# Patient Record
Sex: FEMALE | Race: WHITE | NOT HISPANIC OR LATINO | Employment: OTHER | URBAN - METROPOLITAN AREA
[De-identification: names, ages, dates, MRNs, and addresses within clinical notes are randomized per-mention and may not be internally consistent; named-entity substitution may affect disease eponyms.]

---

## 2017-04-20 ENCOUNTER — APPOINTMENT (OUTPATIENT)
Dept: WOMENS IMAGING | Facility: HOSPITAL | Age: 67
End: 2017-04-20

## 2017-04-20 PROCEDURE — G0202 SCR MAMMO BI INCL CAD: HCPCS | Performed by: RADIOLOGY

## 2017-04-20 PROCEDURE — 77063 BREAST TOMOSYNTHESIS BI: CPT | Performed by: RADIOLOGY

## 2017-05-31 ENCOUNTER — OFFICE VISIT (OUTPATIENT)
Dept: NEUROSURGERY | Facility: CLINIC | Age: 67
End: 2017-05-31

## 2017-05-31 VITALS
DIASTOLIC BLOOD PRESSURE: 68 MMHG | HEART RATE: 68 BPM | SYSTOLIC BLOOD PRESSURE: 115 MMHG | BODY MASS INDEX: 21.19 KG/M2 | HEIGHT: 67 IN | WEIGHT: 135 LBS

## 2017-05-31 DIAGNOSIS — N39.3 STRESS INCONTINENCE IN FEMALE: Primary | ICD-10-CM

## 2017-05-31 DIAGNOSIS — M51.36 DEGENERATION OF LUMBAR INTERVERTEBRAL DISC: ICD-10-CM

## 2017-05-31 PROCEDURE — 99213 OFFICE O/P EST LOW 20 MIN: CPT | Performed by: NURSE PRACTITIONER

## 2017-05-31 RX ORDER — METHYLPREDNISOLONE 4 MG/1
TABLET ORAL
Qty: 1 TABLET | Refills: 0 | Status: SHIPPED | OUTPATIENT
Start: 2017-05-31 | End: 2018-06-11 | Stop reason: HOSPADM

## 2017-06-15 ENCOUNTER — TELEPHONE (OUTPATIENT)
Dept: NEUROSURGERY | Facility: CLINIC | Age: 67
End: 2017-06-15

## 2017-06-15 NOTE — TELEPHONE ENCOUNTER
Patient states that at her last visit you discussed seeing a urologist and possibly scheduling an MRI. She states that she spoke with Liyah Mejia and she thinks that she needs to go ahead with the MRI. Do you want to order an MRI? I didn't see anything in your note regarding ordering an MRI.

## 2017-07-05 ENCOUNTER — APPOINTMENT (OUTPATIENT)
Dept: WOMENS IMAGING | Facility: HOSPITAL | Age: 67
End: 2017-07-05

## 2017-07-05 PROCEDURE — 77080 DXA BONE DENSITY AXIAL: CPT | Performed by: RADIOLOGY

## 2017-08-30 ENCOUNTER — TELEPHONE (OUTPATIENT)
Dept: NEUROSURGERY | Facility: CLINIC | Age: 67
End: 2017-08-30

## 2017-09-01 NOTE — TELEPHONE ENCOUNTER
The message you routed me on this patient was completely blank.    Please discuss with Prabha if you need anything for her.

## 2018-04-23 ENCOUNTER — APPOINTMENT (OUTPATIENT)
Dept: WOMENS IMAGING | Facility: HOSPITAL | Age: 68
End: 2018-04-23

## 2018-04-23 PROCEDURE — 77063 BREAST TOMOSYNTHESIS BI: CPT | Performed by: RADIOLOGY

## 2018-04-23 PROCEDURE — 77067 SCR MAMMO BI INCL CAD: CPT | Performed by: RADIOLOGY

## 2018-06-06 NOTE — PROGRESS NOTES
Subjective   Patient ID: Chely Marie is a 67 y.o. female is here today for 1 year follow-up on neck and low back pain. Patient states that she is doing better. Patient currently taking Norco 5-325 mg, Flexeril 10 mg PRN for pain    Ms. Marie returns to the office for re-evaluation of chronic neck and low back pain. She has been doing physical therapy and she has been seeing a  as well. Right now her symptoms are well controlled.       Back Pain   This is a chronic problem. The current episode started more than 1 year ago. The problem occurs rarely. The problem has been gradually improving since onset. The pain is present in the lumbar spine. The pain does not radiate. The pain is at a severity of 0/10. The symptoms are aggravated by sitting. Associated symptoms include numbness (bilateral toes). Pertinent negatives include no bladder incontinence, bowel incontinence, chest pain, headaches, leg pain or tingling. She has tried home exercises and muscle relaxant (pain medication) for the symptoms.   Neck Pain    This is a chronic problem. The current episode started more than 1 year ago. The problem occurs rarely. The problem has been gradually improving. The pain is at a severity of 0/10. Associated symptoms include numbness (bilateral toes). Pertinent negatives include no chest pain, headaches, leg pain, pain with swallowing, tingling, trouble swallowing or visual change. She has tried home exercises and muscle relaxants (pain medication) for the symptoms.       The following portions of the patient's history were reviewed and updated as appropriate: allergies, current medications, past family history, past medical history, past social history, past surgical history and problem list.    Review of Systems   HENT: Negative for trouble swallowing.    Respiratory: Negative for chest tightness and shortness of breath.    Cardiovascular: Negative for chest pain.   Gastrointestinal: Negative for  bowel incontinence.   Genitourinary: Negative for bladder incontinence.   Musculoskeletal: Positive for back pain and neck pain.   Neurological: Positive for numbness (bilateral toes). Negative for tingling and headaches.   All other systems reviewed and are negative.      Objective   Physical Exam   Constitutional: She is oriented to person, place, and time. She appears well-developed and well-nourished. She is cooperative. No distress.   HENT:   Head: Normocephalic and atraumatic.   Eyes: Conjunctivae are normal.   Neck: Normal range of motion. Neck supple.   Cardiovascular: Normal rate.    Pulmonary/Chest: Effort normal. No respiratory distress.   Abdominal: Soft. She exhibits no distension. There is no tenderness.   Musculoskeletal: Normal range of motion. She exhibits no edema.   Neurological: She is alert and oriented to person, place, and time. She has normal strength. She displays normal reflexes. No sensory deficit. She exhibits normal muscle tone. Coordination normal. GCS eye subscore is 4. GCS verbal subscore is 5. GCS motor subscore is 6.   No motor or sensory deficits in upper or lower extremities. DTR's normal throughout. Negative Parra's; negative clonus. SLR negative bilaterally.   Skin: Skin is warm and dry. No rash noted. She is not diaphoretic.   Psychiatric: She has a normal mood and affect. Thought content normal.   Vitals reviewed.      Assessment/Plan     Medical Decision Making:      Ms. Marie is doing well. Her back pain and neck pain is well controlled with muscle relaxers. She will continue with PT exercises and call us if she notices worsening symptoms. I will see her again in one year.     Chely was seen today for follow-up, back pain and neck pain.    Diagnoses and all orders for this visit:    Degeneration of lumbar intervertebral disc    Degeneration of cervical intervertebral disc    Other orders  -     cyclobenzaprine (FLEXERIL) 10 MG tablet; Take 1 tablet by mouth 3  (Three) Times a Day As Needed for Muscle Spasms.      Return in about 1 year (around 6/11/2019).

## 2018-06-11 ENCOUNTER — OFFICE VISIT (OUTPATIENT)
Dept: NEUROSURGERY | Facility: CLINIC | Age: 68
End: 2018-06-11

## 2018-06-11 VITALS — DIASTOLIC BLOOD PRESSURE: 71 MMHG | SYSTOLIC BLOOD PRESSURE: 124 MMHG | HEIGHT: 67 IN | HEART RATE: 67 BPM

## 2018-06-11 DIAGNOSIS — M50.30 DEGENERATION OF CERVICAL INTERVERTEBRAL DISC: ICD-10-CM

## 2018-06-11 DIAGNOSIS — M51.36 DEGENERATION OF LUMBAR INTERVERTEBRAL DISC: Primary | ICD-10-CM

## 2018-06-11 PROCEDURE — 99213 OFFICE O/P EST LOW 20 MIN: CPT | Performed by: NURSE PRACTITIONER

## 2018-06-11 RX ORDER — CYCLOBENZAPRINE HCL 10 MG
10 TABLET ORAL 3 TIMES DAILY PRN
Qty: 40 TABLET | Refills: 2 | Status: SHIPPED | OUTPATIENT
Start: 2018-06-11 | End: 2019-06-12 | Stop reason: SDUPTHER

## 2018-10-24 ENCOUNTER — APPOINTMENT (OUTPATIENT)
Dept: GENERAL RADIOLOGY | Facility: HOSPITAL | Age: 68
End: 2018-10-24

## 2018-10-24 PROCEDURE — 73630 X-RAY EXAM OF FOOT: CPT | Performed by: FAMILY MEDICINE

## 2018-10-24 PROCEDURE — 73610 X-RAY EXAM OF ANKLE: CPT | Performed by: FAMILY MEDICINE

## 2018-10-26 RX ORDER — FEXOFENADINE HCL 180 MG/1
180 TABLET ORAL DAILY
COMMUNITY
End: 2020-11-12

## 2018-10-26 RX ORDER — CETIRIZINE HYDROCHLORIDE 10 MG/1
10 TABLET ORAL DAILY
COMMUNITY

## 2018-10-29 ENCOUNTER — HOSPITAL ENCOUNTER (OUTPATIENT)
Facility: HOSPITAL | Age: 68
Setting detail: HOSPITAL OUTPATIENT SURGERY
Discharge: HOME | End: 2018-10-29
Attending: ORTHOPAEDIC SURGERY | Admitting: ORTHOPAEDIC SURGERY

## 2018-10-29 ENCOUNTER — ANESTHESIA (OUTPATIENT)
Dept: PERIOP | Facility: HOSPITAL | Age: 68
End: 2018-10-29

## 2018-10-29 ENCOUNTER — APPOINTMENT (OUTPATIENT)
Dept: GENERAL RADIOLOGY | Facility: HOSPITAL | Age: 68
End: 2018-10-29

## 2018-10-29 ENCOUNTER — ANESTHESIA EVENT (OUTPATIENT)
Dept: PERIOP | Facility: HOSPITAL | Age: 68
End: 2018-10-29

## 2018-10-29 VITALS
TEMPERATURE: 97.7 F | RESPIRATION RATE: 18 BRPM | OXYGEN SATURATION: 100 % | DIASTOLIC BLOOD PRESSURE: 79 MMHG | SYSTOLIC BLOOD PRESSURE: 142 MMHG | HEART RATE: 66 BPM

## 2018-10-29 PROCEDURE — 25010000002 FENTANYL CITRATE (PF) 100 MCG/2ML SOLUTION: Performed by: ANESTHESIOLOGY

## 2018-10-29 PROCEDURE — C1713 ANCHOR/SCREW BN/BN,TIS/BN: HCPCS | Performed by: ORTHOPAEDIC SURGERY

## 2018-10-29 PROCEDURE — 25010000002 MIDAZOLAM PER 1 MG: Performed by: ANESTHESIOLOGY

## 2018-10-29 PROCEDURE — 76000 FLUOROSCOPY <1 HR PHYS/QHP: CPT

## 2018-10-29 PROCEDURE — 73610 X-RAY EXAM OF ANKLE: CPT

## 2018-10-29 PROCEDURE — 25010000002 DEXAMETHASONE PER 1 MG: Performed by: ANESTHESIOLOGY

## 2018-10-29 PROCEDURE — 25010000002 PROPOFOL 10 MG/ML EMULSION: Performed by: ANESTHESIOLOGY

## 2018-10-29 PROCEDURE — 25010000002 ONDANSETRON PER 1 MG: Performed by: ANESTHESIOLOGY

## 2018-10-29 PROCEDURE — 25010000003 CEFAZOLIN IN DEXTROSE 2-4 GM/100ML-% SOLUTION: Performed by: ORTHOPAEDIC SURGERY

## 2018-10-29 DEVICE — IMPLANTABLE DEVICE: Type: IMPLANTABLE DEVICE | Site: ANKLE | Status: FUNCTIONAL

## 2018-10-29 DEVICE — SCRW MAXLOCK EXTRM NL 4X16MM: Type: IMPLANTABLE DEVICE | Site: ANKLE | Status: FUNCTIONAL

## 2018-10-29 DEVICE — SCRW MAXLOCK EXTRM NL 4X14MM: Type: IMPLANTABLE DEVICE | Site: ANKLE | Status: FUNCTIONAL

## 2018-10-29 RX ORDER — CEFAZOLIN SODIUM 2 G/100ML
2 INJECTION, SOLUTION INTRAVENOUS ONCE
Status: COMPLETED | OUTPATIENT
Start: 2018-10-29 | End: 2018-10-29

## 2018-10-29 RX ORDER — EPHEDRINE SULFATE 50 MG/ML
5 INJECTION, SOLUTION INTRAVENOUS ONCE AS NEEDED
Status: DISCONTINUED | OUTPATIENT
Start: 2018-10-29 | End: 2018-10-29 | Stop reason: HOSPADM

## 2018-10-29 RX ORDER — OXYCODONE AND ACETAMINOPHEN 7.5; 325 MG/1; MG/1
1 TABLET ORAL ONCE AS NEEDED
Status: DISCONTINUED | OUTPATIENT
Start: 2018-10-29 | End: 2018-10-29 | Stop reason: HOSPADM

## 2018-10-29 RX ORDER — PROMETHAZINE HYDROCHLORIDE 25 MG/ML
12.5 INJECTION, SOLUTION INTRAMUSCULAR; INTRAVENOUS ONCE AS NEEDED
Status: DISCONTINUED | OUTPATIENT
Start: 2018-10-29 | End: 2018-10-29 | Stop reason: HOSPADM

## 2018-10-29 RX ORDER — BUPIVACAINE HYDROCHLORIDE 2.5 MG/ML
INJECTION, SOLUTION EPIDURAL; INFILTRATION; INTRACAUDAL AS NEEDED
Status: DISCONTINUED | OUTPATIENT
Start: 2018-10-29 | End: 2018-10-29 | Stop reason: SURG

## 2018-10-29 RX ORDER — PROMETHAZINE HYDROCHLORIDE 25 MG/1
12.5 TABLET ORAL ONCE AS NEEDED
Status: DISCONTINUED | OUTPATIENT
Start: 2018-10-29 | End: 2018-10-29 | Stop reason: HOSPADM

## 2018-10-29 RX ORDER — FAMOTIDINE 10 MG/ML
20 INJECTION, SOLUTION INTRAVENOUS ONCE
Status: COMPLETED | OUTPATIENT
Start: 2018-10-29 | End: 2018-10-29

## 2018-10-29 RX ORDER — SODIUM CHLORIDE 0.9 % (FLUSH) 0.9 %
1-10 SYRINGE (ML) INJECTION AS NEEDED
Status: DISCONTINUED | OUTPATIENT
Start: 2018-10-29 | End: 2018-10-29 | Stop reason: HOSPADM

## 2018-10-29 RX ORDER — LIDOCAINE HYDROCHLORIDE 20 MG/ML
INJECTION, SOLUTION INFILTRATION; PERINEURAL AS NEEDED
Status: DISCONTINUED | OUTPATIENT
Start: 2018-10-29 | End: 2018-10-29 | Stop reason: SURG

## 2018-10-29 RX ORDER — HYDROMORPHONE HYDROCHLORIDE 1 MG/ML
0.5 INJECTION, SOLUTION INTRAMUSCULAR; INTRAVENOUS; SUBCUTANEOUS
Status: DISCONTINUED | OUTPATIENT
Start: 2018-10-29 | End: 2018-10-29 | Stop reason: HOSPADM

## 2018-10-29 RX ORDER — PROMETHAZINE HYDROCHLORIDE 25 MG/1
25 TABLET ORAL ONCE AS NEEDED
Status: DISCONTINUED | OUTPATIENT
Start: 2018-10-29 | End: 2018-10-29 | Stop reason: HOSPADM

## 2018-10-29 RX ORDER — MIDAZOLAM HYDROCHLORIDE 1 MG/ML
2 INJECTION INTRAMUSCULAR; INTRAVENOUS
Status: DISCONTINUED | OUTPATIENT
Start: 2018-10-29 | End: 2018-10-29 | Stop reason: HOSPADM

## 2018-10-29 RX ORDER — HYDROCODONE BITARTRATE AND ACETAMINOPHEN 7.5; 325 MG/1; MG/1
1 TABLET ORAL ONCE AS NEEDED
Status: DISCONTINUED | OUTPATIENT
Start: 2018-10-29 | End: 2018-10-29 | Stop reason: HOSPADM

## 2018-10-29 RX ORDER — ONDANSETRON 2 MG/ML
4 INJECTION INTRAMUSCULAR; INTRAVENOUS ONCE AS NEEDED
Status: DISCONTINUED | OUTPATIENT
Start: 2018-10-29 | End: 2018-10-29 | Stop reason: HOSPADM

## 2018-10-29 RX ORDER — LIDOCAINE HYDROCHLORIDE 10 MG/ML
0.5 INJECTION, SOLUTION EPIDURAL; INFILTRATION; INTRACAUDAL; PERINEURAL ONCE AS NEEDED
Status: DISCONTINUED | OUTPATIENT
Start: 2018-10-29 | End: 2018-10-29 | Stop reason: HOSPADM

## 2018-10-29 RX ORDER — DEXAMETHASONE SODIUM PHOSPHATE 10 MG/ML
INJECTION INTRAMUSCULAR; INTRAVENOUS AS NEEDED
Status: DISCONTINUED | OUTPATIENT
Start: 2018-10-29 | End: 2018-10-29 | Stop reason: SURG

## 2018-10-29 RX ORDER — SODIUM CHLORIDE, SODIUM LACTATE, POTASSIUM CHLORIDE, CALCIUM CHLORIDE 600; 310; 30; 20 MG/100ML; MG/100ML; MG/100ML; MG/100ML
9 INJECTION, SOLUTION INTRAVENOUS CONTINUOUS
Status: DISCONTINUED | OUTPATIENT
Start: 2018-10-29 | End: 2018-10-29 | Stop reason: HOSPADM

## 2018-10-29 RX ORDER — NALOXONE HCL 0.4 MG/ML
0.2 VIAL (ML) INJECTION AS NEEDED
Status: DISCONTINUED | OUTPATIENT
Start: 2018-10-29 | End: 2018-10-29 | Stop reason: HOSPADM

## 2018-10-29 RX ORDER — ONDANSETRON 2 MG/ML
INJECTION INTRAMUSCULAR; INTRAVENOUS AS NEEDED
Status: DISCONTINUED | OUTPATIENT
Start: 2018-10-29 | End: 2018-10-29 | Stop reason: SURG

## 2018-10-29 RX ORDER — FENTANYL CITRATE 50 UG/ML
50 INJECTION, SOLUTION INTRAMUSCULAR; INTRAVENOUS
Status: DISCONTINUED | OUTPATIENT
Start: 2018-10-29 | End: 2018-10-29 | Stop reason: HOSPADM

## 2018-10-29 RX ORDER — SCOLOPAMINE TRANSDERMAL SYSTEM 1 MG/1
1 PATCH, EXTENDED RELEASE TRANSDERMAL ONCE
Status: DISCONTINUED | OUTPATIENT
Start: 2018-10-29 | End: 2018-10-29 | Stop reason: HOSPADM

## 2018-10-29 RX ORDER — PROMETHAZINE HYDROCHLORIDE 25 MG/1
25 SUPPOSITORY RECTAL ONCE AS NEEDED
Status: DISCONTINUED | OUTPATIENT
Start: 2018-10-29 | End: 2018-10-29 | Stop reason: HOSPADM

## 2018-10-29 RX ORDER — FLUMAZENIL 0.1 MG/ML
0.2 INJECTION INTRAVENOUS AS NEEDED
Status: DISCONTINUED | OUTPATIENT
Start: 2018-10-29 | End: 2018-10-29 | Stop reason: HOSPADM

## 2018-10-29 RX ORDER — PROPOFOL 10 MG/ML
VIAL (ML) INTRAVENOUS AS NEEDED
Status: DISCONTINUED | OUTPATIENT
Start: 2018-10-29 | End: 2018-10-29 | Stop reason: SURG

## 2018-10-29 RX ORDER — MIDAZOLAM HYDROCHLORIDE 1 MG/ML
1 INJECTION INTRAMUSCULAR; INTRAVENOUS
Status: DISCONTINUED | OUTPATIENT
Start: 2018-10-29 | End: 2018-10-29 | Stop reason: HOSPADM

## 2018-10-29 RX ORDER — LABETALOL HYDROCHLORIDE 5 MG/ML
5 INJECTION, SOLUTION INTRAVENOUS
Status: DISCONTINUED | OUTPATIENT
Start: 2018-10-29 | End: 2018-10-29 | Stop reason: HOSPADM

## 2018-10-29 RX ORDER — DIPHENHYDRAMINE HCL 25 MG
25 CAPSULE ORAL EVERY 6 HOURS PRN
Status: DISCONTINUED | OUTPATIENT
Start: 2018-10-29 | End: 2018-10-29 | Stop reason: HOSPADM

## 2018-10-29 RX ADMIN — DEXAMETHASONE SODIUM PHOSPHATE 8 MG: 10 INJECTION INTRAMUSCULAR; INTRAVENOUS at 16:36

## 2018-10-29 RX ADMIN — CEFAZOLIN SODIUM 2 G: 2 INJECTION, SOLUTION INTRAVENOUS at 16:18

## 2018-10-29 RX ADMIN — SODIUM CHLORIDE, POTASSIUM CHLORIDE, SODIUM LACTATE AND CALCIUM CHLORIDE: 600; 310; 30; 20 INJECTION, SOLUTION INTRAVENOUS at 16:15

## 2018-10-29 RX ADMIN — ONDANSETRON 4 MG: 2 INJECTION INTRAMUSCULAR; INTRAVENOUS at 16:36

## 2018-10-29 RX ADMIN — FENTANYL CITRATE 50 MCG: 50 INJECTION, SOLUTION INTRAMUSCULAR; INTRAVENOUS at 14:26

## 2018-10-29 RX ADMIN — MIDAZOLAM 2 MG: 1 INJECTION INTRAMUSCULAR; INTRAVENOUS at 14:26

## 2018-10-29 RX ADMIN — BUPIVACAINE HYDROCHLORIDE 10 ML: 2.5 INJECTION, SOLUTION EPIDURAL; INFILTRATION; INTRACAUDAL; PERINEURAL at 14:37

## 2018-10-29 RX ADMIN — LIDOCAINE HYDROCHLORIDE 80 MG: 20 INJECTION, SOLUTION INFILTRATION; PERINEURAL at 16:17

## 2018-10-29 RX ADMIN — BUPIVACAINE HYDROCHLORIDE 10 ML: 2.5 INJECTION, SOLUTION EPIDURAL; INFILTRATION; INTRACAUDAL; PERINEURAL at 14:33

## 2018-10-29 RX ADMIN — SCOPOLAMINE 1 PATCH: 1 PATCH, EXTENDED RELEASE TRANSDERMAL at 14:25

## 2018-10-29 RX ADMIN — BUPIVACAINE 10 ML: 13.3 INJECTION, SUSPENSION, LIPOSOMAL INFILTRATION at 14:37

## 2018-10-29 RX ADMIN — FAMOTIDINE 20 MG: 10 INJECTION INTRAVENOUS at 14:25

## 2018-10-29 RX ADMIN — BUPIVACAINE 10 ML: 13.3 INJECTION, SUSPENSION, LIPOSOMAL INFILTRATION at 14:33

## 2018-10-29 RX ADMIN — PROPOFOL 125 MG: 10 INJECTION, EMULSION INTRAVENOUS at 16:17

## 2018-10-29 NOTE — ANESTHESIA PROCEDURE NOTES
Airway  Urgency: elective    Airway not difficult    General Information and Staff    Patient location during procedure: OR  Anesthesiologist: AMI DUPREE    Indications and Patient Condition  Indications for airway management: airway protection    Preoxygenated: yes  Mask difficulty assessment: 0 - not attempted    Final Airway Details  Final airway type: supraglottic airway      Successful airway: classic  Size 4    Number of attempts at approach: 1

## 2018-10-29 NOTE — ANESTHESIA PROCEDURE NOTES
LT Adductor Canal Block      Patient reassessed immediately prior to procedure    Patient location during procedure: holding area  Start time: 10/29/2018 2:33 PM  Stop time: 10/29/2018 2:37 PM  Reason for block: at surgeon's request and post-op pain management  Performed by  Anesthesiologist: AMI DUPREE  Preanesthetic Checklist  Completed: patient identified, site marked, surgical consent, pre-op evaluation, timeout performed, IV checked, risks and benefits discussed and monitors and equipment checked  Prep:  Pt Position: supine  Sterile barriers:gloves, mask and sterile barriers  Prep: ChloraPrep  Patient monitoring: blood pressure monitoring, continuous pulse oximetry and EKG  Procedure  Sedation:yes  Performed under: local infiltration  Guidance:ultrasound guided  ULTRASOUND INTERPRETATION.  Using ultrasound guidance a 22 G gauge needle was placed in close proximity to the femoral nerve, at which point, under ultrasound guidance anesthetic was injected in the area of the nerve and spread of the anesthesia was seen on ultrasound in close proximity thereto.  There were no abnormalities seen on ultrasound; a digital image was taken; and the patient tolerated the procedure with no complications. Images:still images obtained    Laterality:left  Block Type:adductor canal block  Injection Technique:single-shot  Needle Type:echogenic  Needle Gauge:22 G  Resistance on Injection: none  Post Assessment  Injection Assessment: negative aspiration for heme, no paresthesia on injection and incremental injection  Patient Tolerance:comfortable throughout block  Complications:no

## 2018-10-29 NOTE — ANESTHESIA PREPROCEDURE EVALUATION
Anesthesia Evaluation     Patient summary reviewed and Nursing notes reviewed   history of anesthetic complications: PONV               Airway   Mallampati: II  TM distance: >3 FB  Neck ROM: limited  no difficulty expected  Dental - normal exam     Pulmonary - negative pulmonary ROS    breath sounds clear to auscultation  (-) shortness of breath, sleep apnea, decreased breath sounds, wheezes  Cardiovascular - normal exam  Exercise tolerance: good (4-7 METS)    Rhythm: regular  Rate: normal    (-) past MI, angina, CHF, orthopnea, PND, LAURA, PVD      Neuro/Psych- negative ROS  (-) seizures, neuromuscular disease, TIA, CVA, dizziness/light headedness, weakness, numbness  GI/Hepatic/Renal/Endo - negative ROS   (-) liver disease, diabetes    Musculoskeletal     (+) back pain, neck pain,   Abdominal  - normal exam   Substance History - negative use  (-) alcohol use, drug use     OB/GYN negative ob/gyn ROS         Other   (+) arthritis                     Anesthesia Plan    ASA 2     general   (With Popliteal and Adductor Canal nerve blocks)  intravenous induction   Anesthetic plan, all risks, benefits, and alternatives have been provided, discussed and informed consent has been obtained with: patient.    Plan discussed with CRNA.

## 2018-10-29 NOTE — ANESTHESIA PROCEDURE NOTES
LT Popliteal Nerve Block    Pre-sedation assessment completed: 10/29/2018 2:20 PM    Patient reassessed immediately prior to procedure    Patient location during procedure: holding area  Start time: 10/29/2018 2:24 PM  Stop time: 10/29/2018 2:33 PM  Reason for block: at surgeon's request and post-op pain management  Performed by  Anesthesiologist: AMI DUPREE  Preanesthetic Checklist  Completed: patient identified, site marked, surgical consent, pre-op evaluation, timeout performed, IV checked, risks and benefits discussed and monitors and equipment checked  Prep:  Pt Position: right lateral decubitus  Sterile barriers:gloves, mask and sterile barriers  Prep: ChloraPrep  Patient monitoring: blood pressure monitoring, continuous pulse oximetry and EKG  Procedure  Sedation:yes  Performed under: local infiltration  Guidance:ultrasound guided  ULTRASOUND INTERPRETATION.  Using ultrasound guidance a 22 G gauge needle was placed in close proximity to the sciatic nerve, at which point, under ultrasound guidance anesthetic was injected in the area of the nerve and spread of the anesthesia was seen on ultrasound in close proximity thereto.  There were no abnormalities seen on ultrasound; a digital image was taken; and the patient tolerated the procedure with no complications. Images:still images obtained    Laterality:left  Block Type:popliteal  Injection Technique:single-shot  Needle Type:echogenic  Needle Gauge:22 G  Resistance on Injection: none  Post Assessment  Injection Assessment: negative aspiration for heme, no paresthesia on injection and incremental injection  Patient Tolerance:comfortable throughout block  Complications:no

## 2018-10-29 NOTE — ANESTHESIA POSTPROCEDURE EVALUATION
Patient: Chely Marie    Procedure Summary     Date:  10/29/18 Room / Location:   [facility]    Anesthesia Start:  1615 Anesthesia Stop:  1721    Procedure:  LT ANKLE OPEN REDUCTION INTERNAL FIXATION (Left Ankle) Diagnosis:      Surgeon:  Enmanuel Camarena MD Provider:  Darien Ta MD    Anesthesia Type:  general ASA Status:  2          Anesthesia Type: general  Last vitals  BP   137/86 (10/29/18 1745)   Temp   36.5 °C (97.7 °F) (10/29/18 1745)   Pulse   57 (10/29/18 1745)   Resp   16 (10/29/18 1745)     SpO2   100 % (10/29/18 1745)     Post Anesthesia Care and Evaluation    Patient location during evaluation: bedside  Patient participation: complete - patient participated  Level of consciousness: awake and alert  Pain management: adequate  Airway patency: patent  Anesthetic complications: No anesthetic complications    Cardiovascular status: acceptable  Respiratory status: acceptable  Hydration status: acceptable    Comments: /86   Pulse 57   Temp 36.5 °C (97.7 °F) (Temporal Artery )   Resp 16   SpO2 100%

## 2019-02-19 ENCOUNTER — TRANSCRIBE ORDERS (OUTPATIENT)
Dept: ADMINISTRATIVE | Facility: HOSPITAL | Age: 69
End: 2019-02-19

## 2019-02-19 DIAGNOSIS — M81.0 OSTEOPOROSIS OF MULTIPLE SITES: Primary | ICD-10-CM

## 2019-02-22 PROBLEM — M80.00XD AGE-RELATED OSTEOPOROSIS WITH CURRENT PATHOLOGICAL FRACTURE WITH ROUTINE HEALING: Status: ACTIVE | Noted: 2019-02-22

## 2019-02-26 ENCOUNTER — INFUSION (OUTPATIENT)
Dept: ONCOLOGY | Facility: HOSPITAL | Age: 69
End: 2019-02-26

## 2019-02-26 VITALS
TEMPERATURE: 97.6 F | SYSTOLIC BLOOD PRESSURE: 116 MMHG | BODY MASS INDEX: 22.27 KG/M2 | OXYGEN SATURATION: 99 % | HEART RATE: 71 BPM | WEIGHT: 138 LBS | DIASTOLIC BLOOD PRESSURE: 80 MMHG

## 2019-02-26 DIAGNOSIS — M80.00XD AGE-RELATED OSTEOPOROSIS WITH CURRENT PATHOLOGICAL FRACTURE WITH ROUTINE HEALING: Primary | ICD-10-CM

## 2019-02-26 PROCEDURE — 96372 THER/PROPH/DIAG INJ SC/IM: CPT | Performed by: NURSE PRACTITIONER

## 2019-02-26 PROCEDURE — 25010000002 DENOSUMAB 60 MG/ML SOLUTION: Performed by: NURSE PRACTITIONER

## 2019-02-26 RX ADMIN — DENOSUMAB 60 MG: 60 INJECTION SUBCUTANEOUS at 13:46

## 2019-02-26 NOTE — PROGRESS NOTES
This RN contacted and spoke to Ines at Dr. Sindhu Sanches's office gave me verbal lab values taken on January 31, 2019 in which the Calcium=9.8, Magnesium=2.1 and Phosphorous=4.4. They were requested to fax these results to our facility. This patient also has stopped taking the Fosamax approximately one week ago at the request of her MD.   Arrived ambulatory  for prolia injection. Indication and side effects reviewed. Denies recent dental work. Labs and medications verified. Prolia administered in left   arm without incidence. Instructed to call prescribing MD for any concerns or questions and instructed on how to schedule future appts.  Pt vu and discharged ambulatory.

## 2019-05-30 ENCOUNTER — APPOINTMENT (OUTPATIENT)
Dept: WOMENS IMAGING | Facility: HOSPITAL | Age: 69
End: 2019-05-30

## 2019-05-30 PROCEDURE — 77063 BREAST TOMOSYNTHESIS BI: CPT | Performed by: RADIOLOGY

## 2019-05-30 PROCEDURE — 77067 SCR MAMMO BI INCL CAD: CPT | Performed by: RADIOLOGY

## 2019-06-12 ENCOUNTER — OFFICE VISIT (OUTPATIENT)
Dept: NEUROSURGERY | Facility: CLINIC | Age: 69
End: 2019-06-12

## 2019-06-12 VITALS — SYSTOLIC BLOOD PRESSURE: 118 MMHG | HEART RATE: 70 BPM | DIASTOLIC BLOOD PRESSURE: 80 MMHG

## 2019-06-12 DIAGNOSIS — M51.36 DEGENERATION OF LUMBAR INTERVERTEBRAL DISC: ICD-10-CM

## 2019-06-12 DIAGNOSIS — M50.30 DEGENERATION OF CERVICAL INTERVERTEBRAL DISC: Primary | ICD-10-CM

## 2019-06-12 PROCEDURE — 99213 OFFICE O/P EST LOW 20 MIN: CPT | Performed by: NURSE PRACTITIONER

## 2019-06-12 RX ORDER — CYCLOBENZAPRINE HCL 10 MG
10 TABLET ORAL 3 TIMES DAILY PRN
Qty: 40 TABLET | Refills: 5 | Status: SHIPPED | OUTPATIENT
Start: 2019-06-12

## 2019-06-12 RX ORDER — METHYLPREDNISOLONE 4 MG/1
TABLET ORAL
Qty: 1 TABLET | Refills: 0 | Status: SHIPPED | OUTPATIENT
Start: 2019-06-12 | End: 2020-06-29 | Stop reason: SDUPTHER

## 2019-06-27 ENCOUNTER — APPOINTMENT (OUTPATIENT)
Dept: WOMENS IMAGING | Facility: HOSPITAL | Age: 69
End: 2019-06-27

## 2019-06-27 PROCEDURE — G0279 TOMOSYNTHESIS, MAMMO: HCPCS | Performed by: RADIOLOGY

## 2019-06-27 PROCEDURE — 77065 DX MAMMO INCL CAD UNI: CPT | Performed by: RADIOLOGY

## 2019-06-27 PROCEDURE — 76641 ULTRASOUND BREAST COMPLETE: CPT | Performed by: RADIOLOGY

## 2019-08-06 ENCOUNTER — TELEPHONE (OUTPATIENT)
Dept: ONCOLOGY | Facility: CLINIC | Age: 69
End: 2019-08-06

## 2019-08-06 ENCOUNTER — TRANSCRIBE ORDERS (OUTPATIENT)
Dept: ADMINISTRATIVE | Facility: HOSPITAL | Age: 69
End: 2019-08-06

## 2019-08-06 DIAGNOSIS — M81.0 OSTEOPOROSIS OF FOREARM: Primary | ICD-10-CM

## 2019-08-06 NOTE — TELEPHONE ENCOUNTER
----- Message from Monserrat Mota sent at 8/6/2019  1:47 PM EDT -----  Regarding: PROLIA  This patient called and left a message about when she is due for a Prolia?  Not a patient here.

## 2019-08-30 ENCOUNTER — INFUSION (OUTPATIENT)
Dept: ONCOLOGY | Facility: HOSPITAL | Age: 69
End: 2019-08-30

## 2019-08-30 VITALS
TEMPERATURE: 98.3 F | RESPIRATION RATE: 18 BRPM | HEART RATE: 67 BPM | DIASTOLIC BLOOD PRESSURE: 81 MMHG | WEIGHT: 136.4 LBS | HEIGHT: 66 IN | OXYGEN SATURATION: 98 % | BODY MASS INDEX: 21.92 KG/M2 | SYSTOLIC BLOOD PRESSURE: 120 MMHG

## 2019-08-30 DIAGNOSIS — M80.00XD AGE-RELATED OSTEOPOROSIS WITH CURRENT PATHOLOGICAL FRACTURE WITH ROUTINE HEALING: Primary | ICD-10-CM

## 2019-08-30 PROCEDURE — 25010000002 DENOSUMAB 60 MG/ML SOLUTION PREFILLED SYRINGE: Performed by: NURSE PRACTITIONER

## 2019-08-30 PROCEDURE — 96372 THER/PROPH/DIAG INJ SC/IM: CPT | Performed by: NURSE PRACTITIONER

## 2019-08-30 RX ADMIN — DENOSUMAB 60 MG: 60 INJECTION SUBCUTANEOUS at 14:54

## 2019-08-30 NOTE — PROGRESS NOTES
Arrived ambulatory  for prolia injection. Indication and side effects reviewed. Denies recent dental work. Labs and medications verified. Prolia administered in right   arm without incidence. Instructed to call prescribing MD for any concerns or questions and instructed on how to schedule future appts.  Pt vu and discharged ambulatory.

## 2019-09-26 ENCOUNTER — APPOINTMENT (OUTPATIENT)
Dept: WOMENS IMAGING | Facility: HOSPITAL | Age: 69
End: 2019-09-26

## 2019-09-26 PROCEDURE — 77080 DXA BONE DENSITY AXIAL: CPT | Performed by: RADIOLOGY

## 2020-01-08 ENCOUNTER — APPOINTMENT (OUTPATIENT)
Dept: WOMENS IMAGING | Facility: HOSPITAL | Age: 70
End: 2020-01-08

## 2020-01-08 PROCEDURE — 76641 ULTRASOUND BREAST COMPLETE: CPT | Performed by: RADIOLOGY

## 2020-03-02 ENCOUNTER — INFUSION (OUTPATIENT)
Dept: ONCOLOGY | Facility: HOSPITAL | Age: 70
End: 2020-03-02

## 2020-03-02 VITALS
WEIGHT: 135.4 LBS | HEART RATE: 70 BPM | TEMPERATURE: 98.2 F | DIASTOLIC BLOOD PRESSURE: 75 MMHG | BODY MASS INDEX: 21.86 KG/M2 | SYSTOLIC BLOOD PRESSURE: 119 MMHG

## 2020-03-02 DIAGNOSIS — M80.00XD AGE-RELATED OSTEOPOROSIS WITH CURRENT PATHOLOGICAL FRACTURE WITH ROUTINE HEALING: Primary | ICD-10-CM

## 2020-03-02 PROCEDURE — 25010000002 DENOSUMAB 60 MG/ML SOLUTION PREFILLED SYRINGE: Performed by: INTERNAL MEDICINE

## 2020-03-02 PROCEDURE — 96372 THER/PROPH/DIAG INJ SC/IM: CPT

## 2020-03-02 RX ADMIN — DENOSUMAB 60 MG: 60 INJECTION SUBCUTANEOUS at 14:51

## 2020-03-02 NOTE — NURSING NOTE
Arrived ambulatory  for prolia injection. Indication and side effects reviewed. Denies recent dental work. Labs and medications verified. Prolia administered in left   arm without incidence. Instructed to call prescribing MD for any concerns or questions and instructed on how to schedule future appts.  Pt vu and discharged ambulatory.

## 2020-06-03 ENCOUNTER — APPOINTMENT (OUTPATIENT)
Dept: WOMENS IMAGING | Facility: HOSPITAL | Age: 70
End: 2020-06-03

## 2020-06-03 PROCEDURE — 77066 DX MAMMO INCL CAD BI: CPT | Performed by: RADIOLOGY

## 2020-06-03 PROCEDURE — 76641 ULTRASOUND BREAST COMPLETE: CPT | Performed by: RADIOLOGY

## 2020-06-03 PROCEDURE — G0279 TOMOSYNTHESIS, MAMMO: HCPCS | Performed by: RADIOLOGY

## 2020-06-26 ENCOUNTER — TELEPHONE (OUTPATIENT)
Dept: NEUROSURGERY | Facility: CLINIC | Age: 70
End: 2020-06-26

## 2020-06-26 NOTE — TELEPHONE ENCOUNTER
Left message regarding appointment on 6/29/2020 @ 930, has been changed to a telephone visit instead. Pt will receive a call from the provider on 6/29/2020 @930. Pt is to call the office if there's any questions.

## 2020-06-29 ENCOUNTER — OFFICE VISIT (OUTPATIENT)
Dept: NEUROSURGERY | Facility: CLINIC | Age: 70
End: 2020-06-29

## 2020-06-29 ENCOUNTER — TELEPHONE (OUTPATIENT)
Dept: NEUROSURGERY | Facility: CLINIC | Age: 70
End: 2020-06-29

## 2020-06-29 DIAGNOSIS — M54.2 PAIN OF CERVICAL SPINE: Primary | ICD-10-CM

## 2020-06-29 DIAGNOSIS — M50.30 DEGENERATION OF CERVICAL INTERVERTEBRAL DISC: ICD-10-CM

## 2020-06-29 PROCEDURE — 99441 PR PHYS/QHP TELEPHONE EVALUATION 5-10 MIN: CPT | Performed by: NURSE PRACTITIONER

## 2020-06-29 RX ORDER — METHYLPREDNISOLONE 4 MG/1
TABLET ORAL
Qty: 1 TABLET | Refills: 0 | Status: SHIPPED | OUTPATIENT
Start: 2020-06-29 | End: 2020-11-12

## 2020-06-29 NOTE — TELEPHONE ENCOUNTER
Attempted to reach pt at both her home and mobile # regarding check in for telehealth visit with Johnna Todd today at 9:30am. LM on mobile for pt to return my call.

## 2020-06-29 NOTE — PROGRESS NOTES
Subjective   History of Present Illness: Chely Marie is a 69 y.o. female is here today for follow-up via telehealth visit. She was last seen in the office 6/12/19 for neck and back pain.     You have chosen to receive care through a telephone visit. Do you consent to use a telephone visit for your medical care today? Yes    I contacted the patient by phone from the neurosurgical office and confirmed with the patient that she was at home.    History of Present Illness     Ms. Marie states that she is doing about the same.  She still has episodes of worsening neck and interscapular pain.  These episodes usually occur after doing some yard work.  These are things that she really enjoys and does not want to stop doing.  She states that after heavy day of yard work her symptoms usually do not occur for another 24 hours.  She denies any radiating arm pain, numbness, tingling, weakness in her upper or lower extremities.     Due to the COVID pandemic, she has not been able to go to [employer] and work with her  as she had been.  I agree that this may be part of the problem.  She has a prescription for Flexeril at home but had been reluctant to take it. I prescribed a Medrol Dosepak last year which she has used when she traveled. She would like to have a refill to keep on hand just in case.      The following portions of the patient's history were reviewed and updated as appropriate: allergies, current medications, past family history, past medical history, past social history, past surgical history and problem list.    Review of Systems   Musculoskeletal: Positive for neck pain.       Objective     There were no vitals filed for this visit.  There is no height or weight on file to calculate BMI. - Deferred      Physical Exam - deferred      Assessment/Plan      Independent Review of Radiographic Studies:      No new radiographic images to review    Medical Decision Making:      Ms. Marie is doing  well although she has been having some worsening neck pain with increased physical activity.  I encouraged her to use her Flexeril on the evenings that she does heavy work in the hopes that this may stave off any worsening spasms the following day.  She agreed to give that a try.  Per her request, I have reordered the Medrol Dosepak for her to have on an as-needed basis should her symptoms worsen.     understands at this is a degenerative process.  Physical activity focused at strengthening the cervical muscles helps to control the symptoms.  She plans to contact [facility] to reschedule an appointment with her  to work on strengthening exercises.  If her symptoms worsen at all, she will call the office otherwise I will see her again in 1 year.  Diagnoses and all orders for this visit:    Pain of cervical spine    Degeneration of cervical intervertebral disc    Other orders  -     methylPREDNISolone (MEDROL, PREM,) 4 MG tablet; follow package directions      Total time for this telehealth visit 7 minutes.    Return in about 1 year (around 6/29/2021) for Johnna Todd.

## 2020-07-17 ENCOUNTER — APPOINTMENT (OUTPATIENT)
Dept: WOMENS IMAGING | Facility: HOSPITAL | Age: 70
End: 2020-07-17

## 2020-07-17 PROCEDURE — 77063 BREAST TOMOSYNTHESIS BI: CPT | Performed by: RADIOLOGY

## 2020-07-17 PROCEDURE — 77067 SCR MAMMO BI INCL CAD: CPT | Performed by: RADIOLOGY

## 2020-08-24 ENCOUNTER — TELEPHONE (OUTPATIENT)
Dept: NEUROSURGERY | Facility: CLINIC | Age: 70
End: 2020-08-24

## 2020-08-24 NOTE — TELEPHONE ENCOUNTER
Patient called wanted to get some clarification on if it is ok to take cyclobenzaprine & metaxalone at the same time and how often to take it.  It is ok to leave a message on the machine.

## 2020-08-24 NOTE — TELEPHONE ENCOUNTER
Lm for patient letting her know that she does not want to take both of these medications at same time, they are both muscle relaxants.  She can take one or the other three times a day

## 2020-08-30 NOTE — TELEPHONE ENCOUNTER
I also reviewed my note. I did not see any reason to order MRI since she was not having leg pain. At the last visit she told me that she has had hysterectomy with bladder repair in the past. Urinary incontinence occurred only while performing certain   exercises at the gym. For that reason, I advised her to see a urologist. I recommend she see the urologist first. If the urologist finds no structural cause to explain the incontinence, then we can consider MRI. PHYSICAL EXAM:  GENERAL: NAD, well-developed, well-nourished  HEAD:  Atraumatic, Normocephalic  EYES: EOMI, PERRL, conjunctiva and sclera clear  NECK: Supple, No JVD  CHEST/LUNG: Clear to auscultation bilaterally; No wheezes, rales or rhonchi  HEART: Regular rate and rhythm; No murmurs, rubs, or gallops, (+)S1, S2  ABDOMEN: Soft, Nontender, Nondistended; Normal Bowel sounds   EXTREMITIES:  2+ Peripheral Pulses, No clubbing, cyanosis, or edema  PSYCH: normal mood and affect  NEUROLOGY: AAOx3, non-focal, decreased sensation to light touch b/l LE to hips, strength 4+/5 b/l LE, 5/5 strength b/l UE; strength exam somewhat limited on L secondary to pain, CN II-XII intact; FTN intact b/l  SKIN: erythema with mild pain to palpation (reportedly pruritic) under b/l breasts, along waistline; small sacral skin tear and healing lesion PHYSICAL EXAM:  GENERAL: NAD, well-developed, well-nourished  HEAD:  Atraumatic, Normocephalic  EYES: EOMI, PERRL, conjunctiva and sclera clear  NECK: Supple, No JVD  CHEST/LUNG: Clear to auscultation bilaterally; No wheezes, rales or rhonchi  HEART: Regular rate and rhythm; No murmurs, rubs, or gallops, (+)S1, S2  ABDOMEN: Soft, Nontender, Nondistended; Normal Bowel sounds   EXTREMITIES:  2+ Peripheral Pulses, No clubbing, cyanosis, or edema  PSYCH: normal mood and affect  NEUROLOGY: AAOx3, non-focal, decreased sensation to light touch b/l LE to hips, strength 4+/5 b/l LE, 5/5 strength b/l UE; strength exam somewhat limited on L secondary to pain, CN II-XII intact; FTN intact b/l  SKIN: erythema with mild pain to palpation (reportedly pruritic) under b/l breasts, along waistline; small sacral skin tear and healing lesion; scattered hypopigments patches

## 2020-09-16 ENCOUNTER — INFUSION (OUTPATIENT)
Dept: ONCOLOGY | Facility: HOSPITAL | Age: 70
End: 2020-09-16

## 2020-09-16 VITALS
WEIGHT: 129 LBS | OXYGEN SATURATION: 98 % | HEIGHT: 66 IN | BODY MASS INDEX: 20.73 KG/M2 | SYSTOLIC BLOOD PRESSURE: 100 MMHG | HEART RATE: 106 BPM | TEMPERATURE: 97.5 F | RESPIRATION RATE: 18 BRPM | DIASTOLIC BLOOD PRESSURE: 65 MMHG

## 2020-09-16 DIAGNOSIS — M80.00XD AGE-RELATED OSTEOPOROSIS WITH CURRENT PATHOLOGICAL FRACTURE WITH ROUTINE HEALING: Primary | ICD-10-CM

## 2020-09-16 PROCEDURE — 96372 THER/PROPH/DIAG INJ SC/IM: CPT

## 2020-09-16 PROCEDURE — 25010000002 DENOSUMAB 60 MG/ML SOLUTION PREFILLED SYRINGE: Performed by: INTERNAL MEDICINE

## 2020-09-16 RX ADMIN — DENOSUMAB 60 MG: 60 INJECTION SUBCUTANEOUS at 13:48

## 2020-09-23 ENCOUNTER — TRANSCRIBE ORDERS (OUTPATIENT)
Dept: ADMINISTRATIVE | Facility: HOSPITAL | Age: 70
End: 2020-09-23

## 2020-09-23 DIAGNOSIS — Z01.818 OTHER SPECIFIED PRE-OPERATIVE EXAMINATION: Primary | ICD-10-CM

## 2020-10-05 ENCOUNTER — LAB (OUTPATIENT)
Dept: LAB | Facility: HOSPITAL | Age: 70
End: 2020-10-05

## 2020-10-05 DIAGNOSIS — Z01.818 OTHER SPECIFIED PRE-OPERATIVE EXAMINATION: ICD-10-CM

## 2020-10-05 PROCEDURE — U0004 COV-19 TEST NON-CDC HGH THRU: HCPCS

## 2020-10-05 PROCEDURE — C9803 HOPD COVID-19 SPEC COLLECT: HCPCS

## 2020-10-06 LAB — SARS-COV-2 RNA RESP QL NAA+PROBE: NOT DETECTED

## 2020-10-07 ENCOUNTER — ON CAMPUS - OUTPATIENT (OUTPATIENT)
Dept: URBAN - METROPOLITAN AREA HOSPITAL 114 | Facility: HOSPITAL | Age: 70
End: 2020-10-07
Payer: COMMERCIAL

## 2020-10-07 ENCOUNTER — ANESTHESIA (OUTPATIENT)
Dept: GASTROENTEROLOGY | Facility: HOSPITAL | Age: 70
End: 2020-10-07

## 2020-10-07 ENCOUNTER — ANESTHESIA EVENT (OUTPATIENT)
Dept: GASTROENTEROLOGY | Facility: HOSPITAL | Age: 70
End: 2020-10-07

## 2020-10-07 ENCOUNTER — HOSPITAL ENCOUNTER (OUTPATIENT)
Facility: HOSPITAL | Age: 70
Setting detail: HOSPITAL OUTPATIENT SURGERY
Discharge: HOME | End: 2020-10-07
Attending: INTERNAL MEDICINE | Admitting: INTERNAL MEDICINE

## 2020-10-07 VITALS
HEART RATE: 56 BPM | DIASTOLIC BLOOD PRESSURE: 85 MMHG | SYSTOLIC BLOOD PRESSURE: 130 MMHG | TEMPERATURE: 97.5 F | BODY MASS INDEX: 20.89 KG/M2 | HEIGHT: 66 IN | WEIGHT: 130 LBS | RESPIRATION RATE: 16 BRPM | OXYGEN SATURATION: 100 %

## 2020-10-07 DIAGNOSIS — D12.8 BENIGN NEOPLASM OF RECTUM: ICD-10-CM

## 2020-10-07 DIAGNOSIS — Z12.11 SCREENING FOR COLON CANCER: ICD-10-CM

## 2020-10-07 DIAGNOSIS — K57.30 DIVERTICULOSIS OF LARGE INTESTINE WITHOUT PERFORATION OR ABS: ICD-10-CM

## 2020-10-07 DIAGNOSIS — Z12.11 ENCOUNTER FOR SCREENING FOR MALIGNANT NEOPLASM OF COLON: ICD-10-CM

## 2020-10-07 PROCEDURE — 45380 COLONOSCOPY AND BIOPSY: CPT | Mod: PT | Performed by: INTERNAL MEDICINE

## 2020-10-07 PROCEDURE — 25010000002 PROPOFOL 10 MG/ML EMULSION: Performed by: ANESTHESIOLOGY

## 2020-10-07 PROCEDURE — 88305 TISSUE EXAM BY PATHOLOGIST: CPT | Performed by: INTERNAL MEDICINE

## 2020-10-07 RX ORDER — LIDOCAINE HYDROCHLORIDE 20 MG/ML
INJECTION, SOLUTION INFILTRATION; PERINEURAL AS NEEDED
Status: DISCONTINUED | OUTPATIENT
Start: 2020-10-07 | End: 2020-10-07 | Stop reason: SURG

## 2020-10-07 RX ORDER — DEXMEDETOMIDINE HYDROCHLORIDE 100 UG/ML
INJECTION, SOLUTION INTRAVENOUS AS NEEDED
Status: DISCONTINUED | OUTPATIENT
Start: 2020-10-07 | End: 2020-10-07 | Stop reason: SURG

## 2020-10-07 RX ORDER — PROPOFOL 10 MG/ML
VIAL (ML) INTRAVENOUS CONTINUOUS PRN
Status: DISCONTINUED | OUTPATIENT
Start: 2020-10-07 | End: 2020-10-07 | Stop reason: SURG

## 2020-10-07 RX ORDER — SODIUM CHLORIDE, SODIUM LACTATE, POTASSIUM CHLORIDE, CALCIUM CHLORIDE 600; 310; 30; 20 MG/100ML; MG/100ML; MG/100ML; MG/100ML
30 INJECTION, SOLUTION INTRAVENOUS CONTINUOUS PRN
Status: DISCONTINUED | OUTPATIENT
Start: 2020-10-07 | End: 2020-10-07 | Stop reason: HOSPADM

## 2020-10-07 RX ORDER — PROPOFOL 10 MG/ML
VIAL (ML) INTRAVENOUS AS NEEDED
Status: DISCONTINUED | OUTPATIENT
Start: 2020-10-07 | End: 2020-10-07 | Stop reason: SURG

## 2020-10-07 RX ADMIN — PROPOFOL 30 MG: 10 INJECTION, EMULSION INTRAVENOUS at 10:39

## 2020-10-07 RX ADMIN — PROPOFOL 100 MG: 10 INJECTION, EMULSION INTRAVENOUS at 10:32

## 2020-10-07 RX ADMIN — DEXMEDETOMIDINE 4 MCG: 100 INJECTION, SOLUTION, CONCENTRATE INTRAVENOUS at 10:30

## 2020-10-07 RX ADMIN — PROPOFOL 160 MCG/KG/MIN: 10 INJECTION, EMULSION INTRAVENOUS at 10:32

## 2020-10-07 RX ADMIN — LIDOCAINE HYDROCHLORIDE 60 MG: 20 INJECTION, SOLUTION INFILTRATION; PERINEURAL at 10:32

## 2020-10-07 RX ADMIN — SODIUM CHLORIDE, POTASSIUM CHLORIDE, SODIUM LACTATE AND CALCIUM CHLORIDE 30 ML/HR: 600; 310; 30; 20 INJECTION, SOLUTION INTRAVENOUS at 09:30

## 2020-10-07 NOTE — ANESTHESIA POSTPROCEDURE EVALUATION
"Patient: Chely Marie    Procedure Summary     Date: 10/07/20 Room / [facility]  [facility]    Anesthesia Start: 1029 Anesthesia Stop: 1116    Procedure: COLONOSCOPY INTO CECUM WITH COLD BX POLYPECTOMY (N/A ) Diagnosis:     Surgeon: Da Serrano MD Provider: Juanito Price DO    Anesthesia Type: MAC ASA Status: 2          Anesthesia Type: MAC    Vitals  Vitals Value Taken Time   /85 10/07/20 1134   Temp     Pulse 56 10/07/20 1134   Resp 16 10/07/20 1134   SpO2 100 % 10/07/20 1134           Post Anesthesia Care and Evaluation    Patient location during evaluation: bedside  Patient participation: complete - patient participated  Level of consciousness: awake and alert  Pain management: adequate  Airway patency: patent  Anesthetic complications: No anesthetic complications    Cardiovascular status: acceptable  Respiratory status: acceptable  Hydration status: acceptable    Comments: /85 (BP Location: Left arm, Patient Position: Lying)   Pulse 56   Temp 36.4 °C (97.5 °F) (Oral)   Resp 16   Ht 167.6 cm (66\")   Wt 59 kg (130 lb)   SpO2 100%   BMI 20.98 kg/m²           "

## 2020-10-07 NOTE — ANESTHESIA PREPROCEDURE EVALUATION
Anesthesia Evaluation     Patient summary reviewed and Nursing notes reviewed   history of anesthetic complications: PONV  NPO Solid Status: > 8 hours             Airway   Mallampati: II  TM distance: >3 FB  Neck ROM: limited  no difficulty expected  Dental - normal exam     Pulmonary - negative pulmonary ROS    breath sounds clear to auscultation  (-) shortness of breath, sleep apnea, decreased breath sounds, wheezes, not a smoker  Cardiovascular - normal exam  Exercise tolerance: good (4-7 METS)    Rhythm: regular  Rate: normal    (-) past MI, angina, CHF, orthopnea, PND, LAURA, PVD      Neuro/Psych- negative ROS  (-) seizures, neuromuscular disease, TIA, CVA, dizziness/light headedness, weakness, numbness  GI/Hepatic/Renal/Endo - negative ROS   (-) liver disease, diabetes    Musculoskeletal     (+) back pain, neck pain,   Abdominal  - normal exam   Substance History - negative use  (-) alcohol use, drug use     OB/GYN negative ob/gyn ROS         Other   arthritis,                        Anesthesia Plan    ASA 2     MAC       Anesthetic plan, all risks, benefits, and alternatives have been provided, discussed and informed consent has been obtained with: patient.

## 2020-10-07 NOTE — H&P
Patient Care Team:  Sindhu Sanches MD as PCP - General (Internal Medicine)  Johnna Todd APRN as Nurse Practitioner (Neurosurgery)  Patel Hutchinson MD as Surgeon (Neurosurgery)    Chief complaint screening     Subjective   The patient presents with no gastrointestinal  Symptoms or issues at this time   History of Present Illness    Review of Systems   All other systems reviewed and are negative.       Past Medical History:   Diagnosis Date   • Ankle fracture     LEFT   • DDD (degenerative disc disease), lumbar    • Degeneration of cervical intervertebral disc    • Neck pain    • PONV (postoperative nausea and vomiting)    • Varicose vein      Past Surgical History:   Procedure Laterality Date   • ANKLE OPEN REDUCTION INTERNAL FIXATION Left 10/29/2018    Procedure: LT ANKLE OPEN REDUCTION INTERNAL FIXATION;  Surgeon: Enmanuel Camarena MD;  [place] [facility] [place] [facility] [place]  Service: Orthopedics   • ANTERIOR CERVICAL FUSION Right 2005    C5-6; Dr. Ward   • BLADDER SURGERY     • HYSTERECTOMY     • LUMBAR DISCECTOMY     • TONSILLECTOMY     • VEIN SURGERY       Family History   Problem Relation Age of Onset   • Alzheimer's disease Other    • Malig Hyperthermia Neg Hx      Social History     Tobacco Use   • Smoking status: Never Smoker   Substance Use Topics   • Alcohol use: Yes     Comment: Social   • Drug use: No     E-cigarette/Vaping     E-cigarette/Vaping Substances     Medications Prior to Admission   Medication Sig Dispense Refill Last Dose   • cetirizine (zyrTEC) 10 MG tablet Take 10 mg by mouth Daily.   10/6/2020 at Unknown time   • Cholecalciferol (VITAMIN D3) 400 UNITS capsule Take 400 Units by mouth Daily.   10/6/2020 at Unknown time   • cyclobenzaprine (FLEXERIL) 10 MG tablet Take 1 tablet by mouth 3 (Three) Times a Day As Needed for Muscle Spasms. 40 tablet 5 10/6/2020 at Unknown time   • estradiol (ESTRACE VAGINAL) 0.1 MG/GM vaginal cream Insert  into the vagina.   10/6/2020 at Unknown time   •  fexofenadine (ALLEGRA ALLERGY) 180 MG tablet Take 180 mg by mouth Daily. ALTERNATE WITH ZYRTEC   10/6/2020 at Unknown time   • fluticasone (FLONASE) 50 MCG/ACT nasal spray 1 spray into the nostril(s) as directed by provider Daily.   10/6/2020 at Unknown time   • metaxalone (SKELAXIN) 800 MG tablet Take 1 tablet by mouth every 8 (eight) hours as needed.   10/6/2020 at Unknown time   • Multiple Vitamins-Minerals (CENTRUM SILVER ADULT 50+) tablet Take 1 tablet by mouth daily.   10/6/2020 at Unknown time   • promethazine (PHENERGAN) 25 MG tablet Take 25 mg by mouth Every 6 (Six) Hours As Needed.   10/6/2020 at Unknown time   • methylPREDNISolone (MEDROL, PREM,) 4 MG tablet follow package directions 1 tablet 0      Allergies:  Patient has no known allergies.    Objective      Vital Signs  Temp:  [97.5 °F (36.4 °C)] 97.5 °F (36.4 °C)  Heart Rate:  [66] 66  Resp:  [12] 12  BP: (141)/(81) 141/81    Physical Exam  HENT:      Head: Normocephalic.      Mouth/Throat:      Mouth: Mucous membranes are moist.   Cardiovascular:      Rate and Rhythm: Normal rate.   Pulmonary:      Effort: Pulmonary effort is normal.   Abdominal:      General: Abdomen is flat.   Neurological:      Mental Status: She is alert.   Psychiatric:         Mood and Affect: Mood normal.         Results Review:   I reviewed the patient's new clinical results.      Assessment/Plan       * No active hospital problems. *      Assessment:  (Age related colon cancer screening).     Plan:   (Colonoscopy risks, alternatives and benefits discussed with patient and the patient is agreeable to having procedure done.).       I discussed the patients findings and my recommendations with patient and nursing staff    Da Serrano MD  10/07/20  10:32 EDT

## 2020-10-08 LAB
CYTO UR: NORMAL
LAB AP CASE REPORT: NORMAL
PATH REPORT.FINAL DX SPEC: NORMAL
PATH REPORT.GROSS SPEC: NORMAL

## 2020-10-28 RX ORDER — METHYLPREDNISOLONE 4 MG/1
TABLET ORAL
OUTPATIENT
Start: 2020-10-28

## 2020-11-10 ENCOUNTER — TELEPHONE (OUTPATIENT)
Dept: NEUROSURGERY | Facility: CLINIC | Age: 70
End: 2020-11-10

## 2020-11-10 NOTE — TELEPHONE ENCOUNTER
THE PATIENT HAD AN APPOINTMENT SCHEDULED WITH GAUTAM THAYER ON 10/29 THAT SHE HAD TO CANCEL DUE TO HER NECK PAIN. THE APPOINTMENT HAS BEEN RESCHEDULED FOR THE EARLIEST AVAILABLE, WHICH IS 1/11/21.     THE PATIENT WAS TOLD SHE HAD TO HAVE AN APPOINTMENT TO GET A REFILL ON HER MEDROL DOSE PACK AND WANTED TO KNOW IF SHE COULD BE SEEN ANY SOONER SO SHE DOESN'T HAVE TO WAIT SO LONG TO OBTAIN THE REFILL.    PLEASE ADVISE. THE PATIENT CAN BE REACHED [phone]    THANK YOU!

## 2020-11-12 ENCOUNTER — OFFICE VISIT (OUTPATIENT)
Dept: NEUROSURGERY | Facility: CLINIC | Age: 70
End: 2020-11-12

## 2020-11-12 VITALS
BODY MASS INDEX: 20.89 KG/M2 | HEIGHT: 66 IN | SYSTOLIC BLOOD PRESSURE: 121 MMHG | WEIGHT: 130 LBS | HEART RATE: 79 BPM | DIASTOLIC BLOOD PRESSURE: 79 MMHG

## 2020-11-12 DIAGNOSIS — M54.2 PAIN OF CERVICAL SPINE: Primary | ICD-10-CM

## 2020-11-12 DIAGNOSIS — M50.30 DEGENERATION OF CERVICAL INTERVERTEBRAL DISC: ICD-10-CM

## 2020-11-12 PROCEDURE — 99212 OFFICE O/P EST SF 10 MIN: CPT | Performed by: NURSE PRACTITIONER

## 2020-11-12 RX ORDER — METHYLPREDNISOLONE 4 MG/1
TABLET ORAL
Qty: 1 EACH | Refills: 0 | Status: SHIPPED | OUTPATIENT
Start: 2020-11-12 | End: 2021-04-14

## 2020-11-12 RX ORDER — HYDROCODONE BITARTRATE AND ACETAMINOPHEN 5; 325 MG/1; MG/1
1 TABLET ORAL SEE ADMIN INSTRUCTIONS
COMMUNITY
Start: 2020-08-06

## 2020-11-12 NOTE — PROGRESS NOTES
"Subjective   History of Present Illness: Chely Marie is a 70 y.o. female is here today for follow-up. She was last in the office 6/29/20 with c/o neck and back pain. Today Ms. Mraie reports intermittent neck pain. It worsens usually after activity. But at random times. Some activities are not bothersome. She is requesting a MDP to keep on hand.    History of Present Illness     Ms. Marie has been seeing me on a yearly basis due to cervical degenerative changes. She has intermittent episodes of cervical spine pain with radiation into the trapezius regions bilaterally. She was working in her garden a couple of weeks ago and had subsequent worsening pain that was refractory to rest and muscle relaxers.  She subsequently took the Medrol Dosepak that she had on hand with significant relief in symptoms.      The following portions of the patient's history were reviewed and updated as appropriate: allergies, current medications, past family history, past medical history, past social history, past surgical history and problem list.      Review of Systems   Constitutional: Negative for chills and fever.   HENT: Negative for tinnitus.    Eyes: Negative for visual disturbance.   Respiratory: Negative for cough and shortness of breath.    Gastrointestinal: Negative for abdominal pain, constipation and diarrhea.   Genitourinary: Negative for difficulty urinating and frequency.        - for bowel or bladder incontinence   Musculoskeletal: Positive for neck pain. Negative for back pain and gait problem (reports that balance is okay; tripped over a rug in October and fell ).   Neurological: Negative for dizziness, weakness, light-headedness, numbness and headaches.   Psychiatric/Behavioral: Negative for sleep disturbance.       Objective     Vitals:    11/12/20 1358   BP: 121/79   Pulse: 79   Weight: 59 kg (130 lb)   Height: 167.6 cm (66\")     Body mass index is 20.98 kg/m².      Physical Exam  Vitals signs " reviewed.   Constitutional:       General: She is not in acute distress.     Appearance: She is well-developed. She is not diaphoretic.   HENT:      Head: Normocephalic and atraumatic.   Eyes:      General:         Right eye: No discharge.         Left eye: No discharge.      Conjunctiva/sclera: Conjunctivae normal.   Neck:      Musculoskeletal: Normal range of motion and neck supple.      Trachea: No tracheal deviation.   Cardiovascular:      Rate and Rhythm: Normal rate.   Pulmonary:      Effort: Pulmonary effort is normal. No respiratory distress.   Abdominal:      General: There is no distension.      Palpations: Abdomen is soft.      Tenderness: There is no abdominal tenderness.   Musculoskeletal: Normal range of motion.         General: No tenderness.   Skin:     General: Skin is warm and dry.      Findings: No erythema.   Neurological:      General: No focal deficit present.      Mental Status: She is alert and oriented to person, place, and time.      GCS: GCS eye subscore is 4. GCS verbal subscore is 5. GCS motor subscore is 6.      Sensory: No sensory deficit.      Motor: No weakness or abnormal muscle tone.      Coordination: Coordination normal.      Gait: Gait normal.      Deep Tendon Reflexes: Reflexes are normal and symmetric. Reflexes normal.   Psychiatric:         Behavior: Behavior is cooperative.         Thought Content: Thought content normal.       Assessment/Plan     No new radiographic images to review.    Medical Decision Making:     Ms. Marie returns to the office for the above complaints. She is doing better. She will keep the prescribed MDP on hand for any future episodes of severe pain. I will see her back at the previously arranged yearly f/u appointment in June of next year. She will call for any worsening symptoms of pain as well as arm pain or weakness.     Diagnoses and all orders for this visit:    1. Pain of cervical spine (Primary)    2. Degeneration of cervical  intervertebral disc    Other orders  -     methylPREDNISolone (MEDROL) 4 MG dose pack; Take as directed on package instructions.  Dispense: 1 each; Refill: 0      Return for Next scheduled follow up, Johnna Todd.

## 2021-02-16 ENCOUNTER — HOSPITAL ENCOUNTER (EMERGENCY)
Facility: HOSPITAL | Age: 71
Discharge: HOME | End: 2021-02-16
Attending: EMERGENCY MEDICINE | Admitting: EMERGENCY MEDICINE

## 2021-02-16 VITALS
OXYGEN SATURATION: 99 % | TEMPERATURE: 99.1 F | HEIGHT: 66 IN | BODY MASS INDEX: 20.98 KG/M2 | DIASTOLIC BLOOD PRESSURE: 67 MMHG | HEART RATE: 94 BPM | RESPIRATION RATE: 20 BRPM | SYSTOLIC BLOOD PRESSURE: 117 MMHG

## 2021-02-16 DIAGNOSIS — E86.0 DEHYDRATION: ICD-10-CM

## 2021-02-16 DIAGNOSIS — R19.7 DIARRHEA OF PRESUMED INFECTIOUS ORIGIN: Primary | ICD-10-CM

## 2021-02-16 LAB
ALBUMIN SERPL-MCNC: 3.3 G/DL (ref 3.5–5.2)
ALBUMIN/GLOB SERPL: 1.3 G/DL
ALP SERPL-CCNC: 57 U/L (ref 39–117)
ALT SERPL W P-5'-P-CCNC: 14 U/L (ref 1–33)
ANION GAP SERPL CALCULATED.3IONS-SCNC: 9.1 MMOL/L (ref 5–15)
AST SERPL-CCNC: 16 U/L (ref 1–32)
BILIRUB SERPL-MCNC: 0.3 MG/DL (ref 0–1.2)
BUN SERPL-MCNC: 13 MG/DL (ref 8–23)
BUN/CREAT SERPL: 11.4 (ref 7–25)
CALCIUM SPEC-SCNC: 8.3 MG/DL (ref 8.6–10.5)
CHLORIDE SERPL-SCNC: 99 MMOL/L (ref 98–107)
CO2 SERPL-SCNC: 26.9 MMOL/L (ref 22–29)
CREAT SERPL-MCNC: 1.14 MG/DL (ref 0.57–1)
DEPRECATED RDW RBC AUTO: 41.6 FL (ref 37–54)
ERYTHROCYTE [DISTWIDTH] IN BLOOD BY AUTOMATED COUNT: 12.8 % (ref 12.3–15.4)
GFR SERPL CREATININE-BSD FRML MDRD: 47 ML/MIN/1.73
GLOBULIN UR ELPH-MCNC: 2.5 GM/DL
GLUCOSE SERPL-MCNC: 144 MG/DL (ref 65–99)
HCT VFR BLD AUTO: 35.2 % (ref 34–46.6)
HGB BLD-MCNC: 11.9 G/DL (ref 12–15.9)
LIPASE SERPL-CCNC: 22 U/L (ref 13–60)
LYMPHOCYTES # BLD MANUAL: 2.22 10*3/MM3 (ref 0.7–3.1)
LYMPHOCYTES NFR BLD MANUAL: 18.2 % (ref 19.6–45.3)
LYMPHOCYTES NFR BLD MANUAL: 7.1 % (ref 5–12)
MCH RBC QN AUTO: 30.4 PG (ref 26.6–33)
MCHC RBC AUTO-ENTMCNC: 33.8 G/DL (ref 31.5–35.7)
MCV RBC AUTO: 89.8 FL (ref 79–97)
MONOCYTES # BLD AUTO: 0.87 10*3/MM3 (ref 0.1–0.9)
MYELOCYTES NFR BLD MANUAL: 1 % (ref 0–0)
NEUTROPHILS # BLD AUTO: 9 10*3/MM3 (ref 1.7–7)
NEUTROPHILS NFR BLD MANUAL: 73.7 % (ref 42.7–76)
NRBC BLD AUTO-RTO: 0.1 /100 WBC (ref 0–0.2)
PLAT MORPH BLD: NORMAL
PLATELET # BLD AUTO: 293 10*3/MM3 (ref 140–450)
PMV BLD AUTO: 8.6 FL (ref 6–12)
POTASSIUM SERPL-SCNC: 3.8 MMOL/L (ref 3.5–5.2)
PROT SERPL-MCNC: 5.8 G/DL (ref 6–8.5)
RBC # BLD AUTO: 3.92 10*6/MM3 (ref 3.77–5.28)
RBC MORPH BLD: NORMAL
SODIUM SERPL-SCNC: 135 MMOL/L (ref 136–145)
WBC # BLD AUTO: 12.21 10*3/MM3 (ref 3.4–10.8)
WBC MORPH BLD: NORMAL

## 2021-02-16 PROCEDURE — 99283 EMERGENCY DEPT VISIT LOW MDM: CPT

## 2021-02-16 PROCEDURE — 85007 BL SMEAR W/DIFF WBC COUNT: CPT | Performed by: EMERGENCY MEDICINE

## 2021-02-16 PROCEDURE — 80053 COMPREHEN METABOLIC PANEL: CPT | Performed by: EMERGENCY MEDICINE

## 2021-02-16 PROCEDURE — 83690 ASSAY OF LIPASE: CPT | Performed by: EMERGENCY MEDICINE

## 2021-02-16 PROCEDURE — 85025 COMPLETE CBC W/AUTO DIFF WBC: CPT | Performed by: EMERGENCY MEDICINE

## 2021-02-16 PROCEDURE — 99284 EMERGENCY DEPT VISIT MOD MDM: CPT

## 2021-02-16 RX ORDER — SODIUM CHLORIDE 0.9 % (FLUSH) 0.9 %
10 SYRINGE (ML) INJECTION AS NEEDED
Status: DISCONTINUED | OUTPATIENT
Start: 2021-02-16 | End: 2021-02-17 | Stop reason: HOSPADM

## 2021-02-16 RX ADMIN — SODIUM CHLORIDE, POTASSIUM CHLORIDE, SODIUM LACTATE AND CALCIUM CHLORIDE 1000 ML: 600; 310; 30; 20 INJECTION, SOLUTION INTRAVENOUS at 19:46

## 2021-02-17 ENCOUNTER — OFFICE (OUTPATIENT)
Dept: URBAN - METROPOLITAN AREA CLINIC 66 | Facility: CLINIC | Age: 71
End: 2021-02-17

## 2021-02-17 VITALS
HEART RATE: 74 BPM | HEIGHT: 66 IN | DIASTOLIC BLOOD PRESSURE: 68 MMHG | TEMPERATURE: 94.3 F | WEIGHT: 130 LBS | SYSTOLIC BLOOD PRESSURE: 106 MMHG

## 2021-02-17 DIAGNOSIS — R19.7 DIARRHEA, UNSPECIFIED: ICD-10-CM

## 2021-02-17 PROCEDURE — 99214 OFFICE O/P EST MOD 30 MIN: CPT | Performed by: NURSE PRACTITIONER

## 2021-02-17 RX ORDER — COLESEVELAM HYDROCHLORIDE 625 MG/1
TABLET, FILM COATED ORAL
Qty: 60 | Refills: 11 | Status: ACTIVE
Start: 2021-02-17

## 2021-02-17 RX ORDER — VANCOMYCIN HYDROCHLORIDE 125 MG/1
500 CAPSULE ORAL
Qty: 40 | Refills: 0 | Status: COMPLETED
Start: 2021-02-17 | End: 2021-03-17

## 2021-02-17 NOTE — ED NOTES
Pt arrived to ER via [facility] with c/o of diarrhea x3 weeks, increased sleepiness, disoriented, and stomach pain.  Pt  states pt is sleeping 14-16 hours a day and is seeing a GI doctor next week. They called their doctor and was told to come to ER in case pt was dehydrated.   Pt was in mask & this RN in mask & eye protection during care.     Joanna Gonzalez, RN  02/16/21 1922

## 2021-02-17 NOTE — ED PROVIDER NOTES
EMERGENCY DEPARTMENT ENCOUNTER    Room Number:  22/22  Date of encounter:  2/16/2021  PCP: Sindhu Sanches MD  Historian: Patient,       HPI:  Chief Complaint: Diarrhea  A complete HPI/ROS/PMH/PSH/SH/FH are unobtainable due to: None    Context: Chely Marie is a 70 y.o. female who presents to the ED c/o diarrhea.  This is been ongoing for 3 weeks.  She reports having about 10 episodes of watery and malodorous diarrhea.  Nothing makes this better or worse.  She reports having some very mild generalized abdominal pain.  No fever or vomiting.   feels that she has been somewhat disoriented and forgetful and has made him concerned that she could be dehydrated.  He reports that she is having decreased p.o. intake.  Patient had stool studies with her PCP but the C. difficile test is still pending.    PAST MEDICAL HISTORY  Active Ambulatory Problems     Diagnosis Date Noted   • Age-related osteoporosis with current pathological fracture with routine healing 02/22/2019     Resolved Ambulatory Problems     Diagnosis Date Noted   • No Resolved Ambulatory Problems     Past Medical History:   Diagnosis Date   • Ankle fracture    • DDD (degenerative disc disease), lumbar    • Degeneration of cervical intervertebral disc    • Neck pain    • PONV (postoperative nausea and vomiting)    • Varicose vein          PAST SURGICAL HISTORY  Past Surgical History:   Procedure Laterality Date   • ANKLE OPEN REDUCTION INTERNAL FIXATION Left 10/29/2018    Procedure: LT ANKLE OPEN REDUCTION INTERNAL FIXATION;  Surgeon: Enmanuel Camarena MD;  [place] [facility] [place] [facility] [place]  Service: Orthopedics   • ANTERIOR CERVICAL FUSION Right 2005    C5-6; Dr. Ward   • BLADDER SURGERY     • COLONOSCOPY N/A 10/7/2020    Procedure: COLONOSCOPY INTO CECUM WITH COLD BX POLYPECTOMY;  Surgeon: Da Serrano MD;  Location: [facility] ENDOSCOPY;  Service: Gastroenterology;  Laterality: N/A;  PRE:  SCREENING  POST:  POLYP   • HYSTERECTOMY     •  LUMBAR DISCECTOMY     • TONSILLECTOMY     • VEIN SURGERY           FAMILY HISTORY  Family History   Problem Relation Age of Onset   • Alzheimer's disease Other    • No Known Problems Mother    • No Known Problems Father    • Malig Hyperthermia Neg Hx          SOCIAL HISTORY  Social History     Socioeconomic History   • Marital status:      Spouse name: Not on file   • Number of children: Not on file   • Years of education: Not on file   • Highest education level: Not on file   Tobacco Use   • Smoking status: Never Smoker   • Smokeless tobacco: Never Used   Substance and Sexual Activity   • Alcohol use: Yes     Comment: Social   • Drug use: No   • Sexual activity: Defer         ALLERGIES  Molds & smuts        REVIEW OF SYSTEMS  Review of Systems     All systems reviewed and negative except for those discussed in HPI.       PHYSICAL EXAM    I have reviewed the triage vital signs and nursing notes.    ED Triage Vitals   Temp Heart Rate Resp BP SpO2   02/16/21 1920 02/16/21 1931 02/16/21 1920 02/16/21 1931 02/16/21 1920   99.1 °F (37.3 °C) 94 16 117/67 97 %      Temp src Heart Rate Source Patient Position BP Location FiO2 (%)   02/16/21 1920 02/16/21 1931 -- -- --   Tympanic Monitor          Physical Exam  GENERAL: not distressed  HENT: nares patent, mucous membranes are dry  EYES: no scleral icterus  CV: regular rhythm, regular rate  RESPIRATORY: normal effort, clear to auscultation bilaterally  ABDOMEN: soft, nontender  MUSCULOSKELETAL: no deformity  NEURO: alert, oriented to name, location, situation.  She is slow in recalling which medicines she normally takes on a daily basis but otherwise is cognitively intact.  Moves all extremities, follows commands  SKIN: warm, dry        LAB RESULTS  Recent Results (from the past 24 hour(s))   Comprehensive Metabolic Panel    Collection Time: 02/16/21  7:45 PM    Specimen: Blood   Result Value Ref Range    Glucose 144 (H) 65 - 99 mg/dL    BUN 13 8 - 23 mg/dL     Creatinine 1.14 (H) 0.57 - 1.00 mg/dL    Sodium 135 (L) 136 - 145 mmol/L    Potassium 3.8 3.5 - 5.2 mmol/L    Chloride 99 98 - 107 mmol/L    CO2 26.9 22.0 - 29.0 mmol/L    Calcium 8.3 (L) 8.6 - 10.5 mg/dL    Total Protein 5.8 (L) 6.0 - 8.5 g/dL    Albumin 3.30 (L) 3.50 - 5.20 g/dL    ALT (SGPT) 14 1 - 33 U/L    AST (SGOT) 16 1 - 32 U/L    Alkaline Phosphatase 57 39 - 117 U/L    Total Bilirubin 0.3 0.0 - 1.2 mg/dL    eGFR Non African Amer 47 (L) >60 mL/min/1.73    Globulin 2.5 gm/dL    A/G Ratio 1.3 g/dL    BUN/Creatinine Ratio 11.4 7.0 - 25.0    Anion Gap 9.1 5.0 - 15.0 mmol/L   Lipase    Collection Time: 02/16/21  7:45 PM    Specimen: Blood   Result Value Ref Range    Lipase 22 13 - 60 U/L   CBC Auto Differential    Collection Time: 02/16/21  7:45 PM    Specimen: Blood   Result Value Ref Range    WBC 12.21 (H) 3.40 - 10.80 10*3/mm3    RBC 3.92 3.77 - 5.28 10*6/mm3    Hemoglobin 11.9 (L) 12.0 - 15.9 g/dL    Hematocrit 35.2 34.0 - 46.6 %    MCV 89.8 79.0 - 97.0 fL    MCH 30.4 26.6 - 33.0 pg    MCHC 33.8 31.5 - 35.7 g/dL    RDW 12.8 12.3 - 15.4 %    RDW-SD 41.6 37.0 - 54.0 fl    MPV 8.6 6.0 - 12.0 fL    Platelets 293 140 - 450 10*3/mm3    nRBC 0.1 0.0 - 0.2 /100 WBC   Manual Differential    Collection Time: 02/16/21  7:45 PM    Specimen: Blood   Result Value Ref Range    Neutrophil % 73.7 42.7 - 76.0 %    Lymphocyte % 18.2 (L) 19.6 - 45.3 %    Monocyte % 7.1 5.0 - 12.0 %    Myelocyte % 1.0 (H) 0.0 - 0.0 %    Neutrophils Absolute 9.00 (H) 1.70 - 7.00 10*3/mm3    Lymphocytes Absolute 2.22 0.70 - 3.10 10*3/mm3    Monocytes Absolute 0.87 0.10 - 0.90 10*3/mm3    RBC Morphology Normal Normal    WBC Morphology Normal Normal    Platelet Morphology Normal Normal       Ordered the above labs and independently reviewed the results.        RADIOLOGY  No Radiology Exams Resulted Within Past 24 Hours    I ordered the above noted radiological studies. Reviewed by me and discussed with radiologist.  See dictation for official  radiology interpretation.      PROCEDURES    Procedures      MEDICATIONS GIVEN IN ER    Medications   sodium chloride 0.9 % flush 10 mL (has no administration in time range)   lactated ringers bolus 1,000 mL (0 mL Intravenous Stopped 2/16/21 2131)         PROGRESS, DATA ANALYSIS, CONSULTS, AND MEDICAL DECISION MAKING    All labs have been independently reviewed by me.  All radiology studies have been reviewed by me and discussed with radiologist dictating the report.   EKG's independently viewed and interpreted by me.  Discussion below represents my analysis of pertinent findings related to patient's condition, differential diagnosis, treatment plan and final disposition.    Differential diagnosis includes infectious diarrhea such as virus versus bacteria, microscopic colitis, inflammatory bowel disease, functional diarrhea, IBS.    ED Course as of Feb 16 2139   Tue Feb 16, 2021 2041 WBC(!): 12.21 [TD]   2041 Sodium(!): 135 [TD]   2041 Creatinine(!): 1.14 [TD]   2136 On repeat evaluation, patient's exam is soft and nontender in her abdomen.  She really wants to go home.  She states that she already has stool and urine test pending through her PCP.  I believe it is safe and appropriate to discharge her home.    [TD]      ED Course User Index  [TD] Tom Flores II, MD       On medical chart review, patient was seen by Dr. Lockwood, gastroenterology, on 10/7/2020.  Patient was seen for screening with plan to perform a colonoscopy.  She was seen by neurosurgery on 11/12/2020 for cervical pain.    I was unable to find any records of recent stool, urine, or blood tests.    PPE: Both the patient and I wore a surgical mask throughout the entire patient encounter. I wore protective goggles.     AS OF 21:39 EST VITALS:    BP - 117/67  HR - 94  TEMP - 99.1 °F (37.3 °C) (Tympanic)  O2 SATS - 99%        DIAGNOSIS  Final diagnoses:   Diarrhea of presumed infectious origin   Dehydration          DISPOSITION  DISCHARGE    FOLLOW-UP  Sindhu Sanches MD  [address]  [place] [address]  [phone]    Call in 1 day  to update on your symptoms and to check on your lab results         Medication List      No changes were made to your prescriptions during this visit.                  Tom Flores II, MD  02/16/21 2445

## 2021-02-17 NOTE — DISCHARGE INSTRUCTIONS
As long as you have no bloody stool and no fever, you can safely use over-the-counter medicines to slow down your diarrhea.

## 2021-03-11 ENCOUNTER — OFFICE (OUTPATIENT)
Dept: URBAN - METROPOLITAN AREA CLINIC 66 | Facility: CLINIC | Age: 71
End: 2021-03-11

## 2021-03-11 VITALS
WEIGHT: 122 LBS | HEIGHT: 66 IN | HEART RATE: 84 BPM | DIASTOLIC BLOOD PRESSURE: 66 MMHG | SYSTOLIC BLOOD PRESSURE: 100 MMHG | TEMPERATURE: 97.1 F

## 2021-03-11 DIAGNOSIS — R19.7 DIARRHEA, UNSPECIFIED: ICD-10-CM

## 2021-03-11 DIAGNOSIS — R19.5 OTHER FECAL ABNORMALITIES: ICD-10-CM

## 2021-03-11 PROCEDURE — 99213 OFFICE O/P EST LOW 20 MIN: CPT | Performed by: NURSE PRACTITIONER

## 2021-03-15 ENCOUNTER — TRANSCRIBE ORDERS (OUTPATIENT)
Dept: ADMINISTRATIVE | Facility: HOSPITAL | Age: 71
End: 2021-03-15

## 2021-03-15 DIAGNOSIS — Z13.9 VISIT FOR SCREENING: Primary | ICD-10-CM

## 2021-03-16 ENCOUNTER — TRANSCRIBE ORDERS (OUTPATIENT)
Dept: ADMINISTRATIVE | Facility: HOSPITAL | Age: 71
End: 2021-03-16

## 2021-03-16 ENCOUNTER — BULK ORDERING (OUTPATIENT)
Dept: CASE MANAGEMENT | Facility: OTHER | Age: 71
End: 2021-03-16

## 2021-03-16 DIAGNOSIS — Z23 IMMUNIZATION DUE: ICD-10-CM

## 2021-03-16 DIAGNOSIS — R41.3 MEMORY LOSS: Primary | ICD-10-CM

## 2021-04-06 ENCOUNTER — INFUSION (OUTPATIENT)
Dept: ONCOLOGY | Facility: HOSPITAL | Age: 71
End: 2021-04-06

## 2021-04-06 VITALS — RESPIRATION RATE: 18 BRPM | TEMPERATURE: 97.5 F | HEIGHT: 67 IN | BODY MASS INDEX: 20.67 KG/M2

## 2021-04-06 DIAGNOSIS — M80.00XD AGE-RELATED OSTEOPOROSIS WITH CURRENT PATHOLOGICAL FRACTURE WITH ROUTINE HEALING: Primary | ICD-10-CM

## 2021-04-06 PROCEDURE — 96372 THER/PROPH/DIAG INJ SC/IM: CPT

## 2021-04-06 PROCEDURE — 25010000002 DENOSUMAB 60 MG/ML SOLUTION PREFILLED SYRINGE: Performed by: INTERNAL MEDICINE

## 2021-04-06 RX ADMIN — DENOSUMAB 60 MG: 60 INJECTION SUBCUTANEOUS at 14:24

## 2021-04-12 ENCOUNTER — HOSPITAL ENCOUNTER (OUTPATIENT)
Dept: MRI IMAGING | Facility: HOSPITAL | Age: 71
Discharge: HOME | End: 2021-04-12
Admitting: INTERNAL MEDICINE

## 2021-04-12 DIAGNOSIS — R41.3 MEMORY LOSS: ICD-10-CM

## 2021-04-12 PROCEDURE — 82565 ASSAY OF CREATININE: CPT

## 2021-04-12 PROCEDURE — A9577 INJ MULTIHANCE: HCPCS | Performed by: INTERNAL MEDICINE

## 2021-04-12 PROCEDURE — 70553 MRI BRAIN STEM W/O & W/DYE: CPT

## 2021-04-12 PROCEDURE — 0 GADOBENATE DIMEGLUMINE 529 MG/ML SOLUTION: Performed by: INTERNAL MEDICINE

## 2021-04-12 RX ADMIN — GADOBENATE DIMEGLUMINE 11 ML: 529 INJECTION, SOLUTION INTRAVENOUS at 08:36

## 2021-04-13 LAB — CREAT BLDA-MCNC: 0.9 MG/DL (ref 0.6–1.3)

## 2021-04-14 RX ORDER — METHYLPREDNISOLONE 4 MG/1
TABLET ORAL
Qty: 1 EACH | Refills: 0 | Status: SHIPPED | OUTPATIENT
Start: 2021-04-14 | End: 2022-01-07

## 2021-04-14 NOTE — TELEPHONE ENCOUNTER
Please sign if appropriate for patient's request Rx of medrol dose pack. Patient has f/u on 6/30/21.

## 2021-04-14 NOTE — TELEPHONE ENCOUNTER
Called and spoke with patient that her prescription was filled and can be picked up at her pharmacy. Patient voiced understanding.

## 2021-04-14 NOTE — TELEPHONE ENCOUNTER
Please contact patient and find out what is going on. We do not refill steroids unless there is some worsening pain symptoms. Please ask where pain is - neck? Arms- R or L? Numbness, tingling, weakness? Any injury? When did it start and what makes it worse or better.

## 2021-05-20 ENCOUNTER — OFFICE (OUTPATIENT)
Dept: URBAN - METROPOLITAN AREA CLINIC 66 | Facility: CLINIC | Age: 71
End: 2021-05-20

## 2021-05-20 VITALS
DIASTOLIC BLOOD PRESSURE: 76 MMHG | TEMPERATURE: 97.6 F | HEART RATE: 61 BPM | SYSTOLIC BLOOD PRESSURE: 116 MMHG | HEIGHT: 66 IN

## 2021-05-20 DIAGNOSIS — A04.7 ENTEROCOLITIS DUE TO CLOSTRIDIUM DIFFICILE: ICD-10-CM

## 2021-05-20 PROCEDURE — 99213 OFFICE O/P EST LOW 20 MIN: CPT | Performed by: NURSE PRACTITIONER

## 2021-06-02 ENCOUNTER — TELEPHONE (OUTPATIENT)
Dept: NEUROSURGERY | Facility: CLINIC | Age: 71
End: 2021-06-02

## 2021-06-14 NOTE — PROGRESS NOTES
"Subjective   History of Present Illness: Chely Marie is a 70 y.o. female is here today for 1 year follow up for cervical degeneration. She was last seen in the office on 11/12/20 for c/o neck and back pain with intermittent neck pain.  She was prescribed MDP on hand for any future episodes of severe pain and was to call for any worsening of symptoms of pain, as well as arm pain or weakness.  Pain has not gotten worse.  Pain is managed for now. The neck pain started in 2005 and then had a C5-6 fusion and then C4-5 and C6-7 \"went bad.\"  Dr. Hutchinson then got her involved with [facility], Liyah Mejia.  Since 2010 she has been seeing her once a month, as she loves to work out outside, but credits her with getting stronger.   Pain is under control.     Last visit 11/12/20,she had a flare up in November medrol dose pack was ordered. States that she did take the medrol dose pack that was given, at her last visit.  She states that she keeps the medrol for the travel and was in [place] with her grandchildren, when she took.  She states that she starts with skelaxin, flexeril and then hydrocodone, and when those stop helping, she takes the MDP.  She states that she walks outside and tries to do keep herself active.  She has had RAÚL injections, in the past, is not interested in this presently.  She currently states that her pain today is 0/0.  She does follows up yearly to make sure that she is on all the latest medical treatments that are available.    History of Present Illness    The following portions of the patient's history were reviewed and updated as appropriate: allergies, current medications, past family history, past medical history, past social history, past surgical history and problem list.    Review of Systems   Constitutional: Negative.  Negative for activity change.   HENT: Negative.    Eyes: Negative.    Respiratory: Negative.    Cardiovascular: Negative.    Gastrointestinal: Negative.  " "  Endocrine: Negative.    Genitourinary: Negative.    Musculoskeletal: Positive for neck pain and neck stiffness.   Skin: Negative.    Allergic/Immunologic: Negative.    Neurological: Negative.    Hematological: Bruises/bleeds easily.   Psychiatric/Behavioral: Negative.    All other systems reviewed and are negative.      Objective     Vitals:    06/29/21 1304   BP: 110/68   BP Location: Right arm   Patient Position: Sitting   Cuff Size: Large Adult   Pulse: 85   Resp: 18   SpO2: 99%   Weight: 57.2 kg (126 lb)   Height: 167.6 cm (66\")   PainSc: 0-No pain     Body mass index is 20.34 kg/m².      Physical Exam  Vitals and nursing note reviewed.   Constitutional:       Appearance: Normal appearance. She is well-developed and well-groomed.   HENT:      Head: Normocephalic and atraumatic.      Mouth/Throat:      Mouth: Mucous membranes are moist.   Eyes:      Conjunctiva/sclera: Conjunctivae normal.   Cardiovascular:      Rate and Rhythm: Normal rate.      Pulses: Normal pulses.   Pulmonary:      Effort: Pulmonary effort is normal.   Abdominal:      General: Bowel sounds are normal.   Musculoskeletal:         General: Normal range of motion.      Cervical back: Normal range of motion and neck supple.   Skin:     General: Skin is warm.   Neurological:      Mental Status: She is alert and oriented to person, place, and time.      GCS: GCS eye subscore is 4. GCS verbal subscore is 5. GCS motor subscore is 6.      Sensory: Sensation is intact.      Motor: Motor function is intact.      Coordination: Coordination is intact.      Gait: Gait is intact.      Deep Tendon Reflexes: Strength normal.   Psychiatric:         Attention and Perception: Attention normal.         Mood and Affect: Mood normal.         Speech: Speech normal.         Behavior: Behavior normal. Behavior is cooperative.         Thought Content: Thought content normal.         Cognition and Memory: Cognition normal.         Judgment: Judgment normal. "       Neurologic Exam     Mental Status   Oriented to person, place, and time.   Speech: speech is normal     Motor Exam   Muscle bulk: normal  Overall muscle tone: normal    Strength   Strength 5/5 throughout.     Sensory Exam   Light touch normal.     Gait, Coordination, and Reflexes     Gait  Gait: normal    Tremor   Resting tremor: absent  Intention tremor: absent  Action tremor: absent          Assessment/Plan   Independent Review of Radiographic Studies:      No new imaging    Medical Decision Making:    She is doing well today, we talked about all the different treatment options she has available, such as epidural injections, if pain increases, in the future.  She states that she has had epidural injections in the past but it has been several years and she had kind of forgotten about that.  In addition I ordered her another Medrol Dosepak since she goes out of town to see her grandkids and is unable to leave them if she has a flareup of neck pain, or take pain medication and lay down.  I explained that she can use heat to her neck followed by ice, Biofreeze, or other type of medicated cream.  She will continue to follow-up with Liyah, monthly at [facility], and continue to do her own exercises.  She will follow up in a year with our office, or sooner if anything new arises.  In addition, she will call the office if she is interested in any epidural injections.  Or if she has any new onset pain she will return to the office so we can get new images, as it has been a while since she has had any on file.    Diagnoses and all orders for this visit:    1. Cervical pain (neck) (Primary)    2. Degeneration of cervical intervertebral disc    Other orders  -     methylPREDNISolone (MEDROL) 4 MG dose pack; Take as directed on package instructions.  Dispense: 21 tablet; Refill: 0      Return in about 1 year (around 6/29/2022) for follow up with [facility].

## 2021-06-28 ENCOUNTER — TELEPHONE (OUTPATIENT)
Dept: NEUROSURGERY | Facility: CLINIC | Age: 71
End: 2021-06-28

## 2021-06-29 ENCOUNTER — OFFICE VISIT (OUTPATIENT)
Dept: NEUROSURGERY | Facility: CLINIC | Age: 71
End: 2021-06-29

## 2021-06-29 VITALS
HEIGHT: 66 IN | HEART RATE: 85 BPM | RESPIRATION RATE: 18 BRPM | BODY MASS INDEX: 20.25 KG/M2 | OXYGEN SATURATION: 99 % | WEIGHT: 126 LBS | SYSTOLIC BLOOD PRESSURE: 110 MMHG | DIASTOLIC BLOOD PRESSURE: 68 MMHG

## 2021-06-29 DIAGNOSIS — M50.30 DEGENERATION OF CERVICAL INTERVERTEBRAL DISC: ICD-10-CM

## 2021-06-29 DIAGNOSIS — M54.2 CERVICAL PAIN (NECK): Primary | ICD-10-CM

## 2021-06-29 PROCEDURE — 99213 OFFICE O/P EST LOW 20 MIN: CPT | Performed by: NURSE PRACTITIONER

## 2021-06-29 RX ORDER — DENOSUMAB 60 MG/ML
INJECTION SUBCUTANEOUS ONCE
COMMUNITY

## 2021-06-29 RX ORDER — METHYLPREDNISOLONE 4 MG/1
TABLET ORAL
Qty: 21 TABLET | Refills: 0 | Status: SHIPPED | OUTPATIENT
Start: 2021-06-29 | End: 2022-01-07

## 2021-06-29 RX ORDER — LANOLIN ALCOHOL/MO/W.PET/CERES
1000 CREAM (GRAM) TOPICAL DAILY
COMMUNITY

## 2021-06-29 RX ORDER — AZELASTINE HYDROCHLORIDE 0.5 MG/ML
1 SOLUTION/ DROPS OPHTHALMIC 2 TIMES DAILY
COMMUNITY
End: 2022-12-06

## 2021-06-29 RX ORDER — CHOLECALCIFEROL (VITAMIN D3) 125 MCG
CAPSULE ORAL DAILY
COMMUNITY

## 2021-10-08 ENCOUNTER — IMMUNIZATION (OUTPATIENT)
Dept: VACCINE CLINIC | Facility: HOSPITAL | Age: 71
End: 2021-10-08

## 2021-10-08 PROCEDURE — 91300 HC SARSCOV02 VAC 30MCG/0.3ML IM: CPT | Performed by: INTERNAL MEDICINE

## 2021-10-08 PROCEDURE — 0001A: CPT | Performed by: INTERNAL MEDICINE

## 2021-10-08 PROCEDURE — 0004A ADM SARSCOV2 30MCG/0.3ML BOOSTER: CPT | Performed by: INTERNAL MEDICINE

## 2021-10-11 ENCOUNTER — INFUSION (OUTPATIENT)
Dept: ONCOLOGY | Facility: HOSPITAL | Age: 71
End: 2021-10-11

## 2021-10-11 VITALS — BODY MASS INDEX: 19.73 KG/M2 | RESPIRATION RATE: 18 BRPM | TEMPERATURE: 96.9 F | HEIGHT: 67 IN

## 2021-10-11 DIAGNOSIS — M80.00XD AGE-RELATED OSTEOPOROSIS WITH CURRENT PATHOLOGICAL FRACTURE WITH ROUTINE HEALING: Primary | ICD-10-CM

## 2021-10-11 PROCEDURE — 25010000002 DENOSUMAB 60 MG/ML SOLUTION PREFILLED SYRINGE: Performed by: INTERNAL MEDICINE

## 2021-10-11 PROCEDURE — 96372 THER/PROPH/DIAG INJ SC/IM: CPT

## 2021-10-11 RX ADMIN — DENOSUMAB 60 MG: 60 INJECTION SUBCUTANEOUS at 14:18

## 2021-10-21 ENCOUNTER — APPOINTMENT (OUTPATIENT)
Dept: WOMENS IMAGING | Facility: HOSPITAL | Age: 71
End: 2021-10-21

## 2021-10-21 PROCEDURE — 77067 SCR MAMMO BI INCL CAD: CPT | Performed by: RADIOLOGY

## 2021-10-21 PROCEDURE — 77063 BREAST TOMOSYNTHESIS BI: CPT | Performed by: RADIOLOGY

## 2021-11-12 ENCOUNTER — TELEPHONE (OUTPATIENT)
Dept: NEUROSURGERY | Facility: CLINIC | Age: 71
End: 2021-11-12

## 2021-11-12 NOTE — TELEPHONE ENCOUNTER
Pt wants to know if it is OK to take Promethazine with Flexeril?  Pharmacist told her there is a contraindication,  [phone]

## 2021-12-09 ENCOUNTER — TELEPHONE (OUTPATIENT)
Dept: NEUROSURGERY | Facility: CLINIC | Age: 71
End: 2021-12-09

## 2021-12-09 DIAGNOSIS — M50.30 DEGENERATION OF CERVICAL INTERVERTEBRAL DISC: ICD-10-CM

## 2021-12-09 DIAGNOSIS — M54.2 CERVICAL PAIN (NECK): Primary | ICD-10-CM

## 2021-12-09 NOTE — TELEPHONE ENCOUNTER
Pt saw you on 6/29/21 for cervical pain. At the visit you offered her epidurals and she has decided that she would like to get them at [facility].  [phone]

## 2021-12-09 NOTE — TELEPHONE ENCOUNTER
Spoke with patient on telephone regarding her call that she would like to move forward with epidural injections.  I discussed that we had not had any recent imaging, and since she has a history of cervical surgery that we would need to do more imaging before we proceeded with the epidural injections, she is in agreement with this plan.  I have ordered MRIs of the cervical spine with contrast. I will review the MRIs and then order the epidural injections if appropriate at that time.

## 2021-12-10 ENCOUNTER — TELEPHONE (OUTPATIENT)
Dept: NEUROSURGERY | Facility: CLINIC | Age: 71
End: 2021-12-10

## 2021-12-10 NOTE — TELEPHONE ENCOUNTER
I spoke with the patient and she just had questions about the scheduling of the MRI. I transferred her to scheduling and provided her the number.

## 2022-01-05 ENCOUNTER — TELEPHONE (OUTPATIENT)
Dept: NEUROSURGERY | Facility: CLINIC | Age: 72
End: 2022-01-05

## 2022-01-07 RX ORDER — METHYLPREDNISOLONE 4 MG/1
TABLET ORAL
Qty: 21 TABLET | Refills: 0 | Status: SHIPPED | OUTPATIENT
Start: 2022-01-07 | End: 2022-03-02

## 2022-01-12 ENCOUNTER — HOSPITAL ENCOUNTER (OUTPATIENT)
Dept: MRI IMAGING | Facility: HOSPITAL | Age: 72
Discharge: HOME | End: 2022-01-12
Admitting: NURSE PRACTITIONER

## 2022-01-12 ENCOUNTER — TELEPHONE (OUTPATIENT)
Dept: NEUROSURGERY | Facility: CLINIC | Age: 72
End: 2022-01-12

## 2022-01-12 DIAGNOSIS — M54.2 CERVICAL PAIN (NECK): ICD-10-CM

## 2022-01-12 DIAGNOSIS — M50.30 DEGENERATION OF CERVICAL INTERVERTEBRAL DISC: ICD-10-CM

## 2022-01-12 PROCEDURE — A9577 INJ MULTIHANCE: HCPCS | Performed by: NURSE PRACTITIONER

## 2022-01-12 PROCEDURE — 82565 ASSAY OF CREATININE: CPT

## 2022-01-12 PROCEDURE — 0 GADOBENATE DIMEGLUMINE 529 MG/ML SOLUTION: Performed by: NURSE PRACTITIONER

## 2022-01-12 PROCEDURE — 72156 MRI NECK SPINE W/O & W/DYE: CPT

## 2022-01-12 RX ADMIN — GADOBENATE DIMEGLUMINE 15 ML: 529 INJECTION, SOLUTION INTRAVENOUS at 08:24

## 2022-01-12 NOTE — TELEPHONE ENCOUNTER
Caller: Abhay Marie    Relationship to patient: Emergency Contact    Best call back number: [phone]    Patient is needing: PATIENT'S  STATES MRI IS BEING COMPLETE THIS MORNING AND WOULD LIKE A CALL TO SCHEDULE A FOLLOW UP AND TEST RESULTS      THANK YOU

## 2022-01-12 NOTE — TELEPHONE ENCOUNTER
"I returned the 's call regarding a follow-up appointment. Lina ordered the MRI and has spoke with the patient, per her last telephone message, \"I discussed that we had not had any recent imaging, and since she has a history of cervical surgery that we would need to do more imaging before we proceeded with the epidural injections, she is in agreement with this plan.  I have ordered MRIs of the cervical spine with contrast. I will review the MRIs and then order the epidural injections if appropriate at that time.\"   "

## 2022-01-13 DIAGNOSIS — M50.30 DEGENERATION OF CERVICAL INTERVERTEBRAL DISC: Primary | ICD-10-CM

## 2022-01-13 DIAGNOSIS — M54.2 CERVICAL PAIN (NECK): ICD-10-CM

## 2022-01-13 LAB — CREAT BLDA-MCNC: 1 MG/DL (ref 0.6–1.3)

## 2022-01-28 ENCOUNTER — TELEPHONE (OUTPATIENT)
Dept: NEUROSURGERY | Facility: CLINIC | Age: 72
End: 2022-01-28

## 2022-01-28 NOTE — TELEPHONE ENCOUNTER
I called the patient and gave her the number to the hospital pain management for scheduling of her injections.

## 2022-01-28 NOTE — TELEPHONE ENCOUNTER
Provider: ELVER  Caller: ADRIANA  Relationship to Patient: SELFF  Phone Number: [phone]  Reason for Call:     PT CALLED TO FIND OUT HOW SHE CAN MOVE FORWARD WITH SCHEDULING HER INJECTIONS     PLEASE CALL PT AND ADVISE     THANK YOU

## 2022-02-09 ENCOUNTER — ANESTHESIA EVENT (OUTPATIENT)
Dept: PAIN MEDICINE | Facility: HOSPITAL | Age: 72
End: 2022-02-09

## 2022-02-09 ENCOUNTER — HOSPITAL ENCOUNTER (OUTPATIENT)
Dept: GENERAL RADIOLOGY | Facility: HOSPITAL | Age: 72
Discharge: HOME | End: 2022-02-09

## 2022-02-09 ENCOUNTER — HOSPITAL ENCOUNTER (OUTPATIENT)
Dept: PAIN MEDICINE | Facility: HOSPITAL | Age: 72
Discharge: HOME | End: 2022-02-09

## 2022-02-09 ENCOUNTER — ANESTHESIA (OUTPATIENT)
Dept: PAIN MEDICINE | Facility: HOSPITAL | Age: 72
End: 2022-02-09

## 2022-02-09 VITALS
HEART RATE: 64 BPM | BODY MASS INDEX: 21.38 KG/M2 | DIASTOLIC BLOOD PRESSURE: 75 MMHG | TEMPERATURE: 97.1 F | HEIGHT: 66 IN | RESPIRATION RATE: 16 BRPM | WEIGHT: 133 LBS | SYSTOLIC BLOOD PRESSURE: 135 MMHG | OXYGEN SATURATION: 96 %

## 2022-02-09 DIAGNOSIS — R52 PAIN: ICD-10-CM

## 2022-02-09 DIAGNOSIS — M50.30 DEGENERATION OF CERVICAL INTERVERTEBRAL DISC: Primary | ICD-10-CM

## 2022-02-09 PROCEDURE — 25010000002 DEXAMETHASONE PER 1 MG: Performed by: ANESTHESIOLOGY

## 2022-02-09 PROCEDURE — 25010000002 FENTANYL CITRATE (PF) 50 MCG/ML SOLUTION: Performed by: ANESTHESIOLOGY

## 2022-02-09 PROCEDURE — 0 IOPAMIDOL 41 % SOLUTION: Performed by: ANESTHESIOLOGY

## 2022-02-09 PROCEDURE — 77003 FLUOROGUIDE FOR SPINE INJECT: CPT

## 2022-02-09 PROCEDURE — 25010000002 MIDAZOLAM PER 1 MG: Performed by: ANESTHESIOLOGY

## 2022-02-09 RX ORDER — FENTANYL CITRATE 50 UG/ML
50 INJECTION, SOLUTION INTRAMUSCULAR; INTRAVENOUS AS NEEDED
Status: DISCONTINUED | OUTPATIENT
Start: 2022-02-09 | End: 2022-02-10 | Stop reason: HOSPADM

## 2022-02-09 RX ORDER — SODIUM CHLORIDE 0.9 % (FLUSH) 0.9 %
3 SYRINGE (ML) INJECTION EVERY 12 HOURS SCHEDULED
Status: DISCONTINUED | OUTPATIENT
Start: 2022-02-09 | End: 2022-02-10 | Stop reason: HOSPADM

## 2022-02-09 RX ORDER — SODIUM CHLORIDE 0.9 % (FLUSH) 0.9 %
3-10 SYRINGE (ML) INJECTION AS NEEDED
Status: DISCONTINUED | OUTPATIENT
Start: 2022-02-09 | End: 2022-02-10 | Stop reason: HOSPADM

## 2022-02-09 RX ORDER — SODIUM CHLORIDE 0.9 % (FLUSH) 0.9 %
1-10 SYRINGE (ML) INJECTION AS NEEDED
Status: DISCONTINUED | OUTPATIENT
Start: 2022-02-09 | End: 2022-02-10 | Stop reason: HOSPADM

## 2022-02-09 RX ORDER — DEXAMETHASONE SODIUM PHOSPHATE 4 MG/ML
INJECTION, SOLUTION INTRA-ARTICULAR; INTRALESIONAL; INTRAMUSCULAR; INTRAVENOUS; SOFT TISSUE
Status: COMPLETED | OUTPATIENT
Start: 2022-02-09 | End: 2022-02-09

## 2022-02-09 RX ORDER — LIDOCAINE HYDROCHLORIDE 10 MG/ML
0.5 INJECTION, SOLUTION INFILTRATION; PERINEURAL ONCE AS NEEDED
Status: DISCONTINUED | OUTPATIENT
Start: 2022-02-09 | End: 2022-02-10 | Stop reason: HOSPADM

## 2022-02-09 RX ORDER — MIDAZOLAM HYDROCHLORIDE 1 MG/ML
1 INJECTION INTRAMUSCULAR; INTRAVENOUS AS NEEDED
Status: DISCONTINUED | OUTPATIENT
Start: 2022-02-09 | End: 2022-02-10 | Stop reason: HOSPADM

## 2022-02-09 RX ORDER — DEXAMETHASONE SODIUM PHOSPHATE 10 MG/ML
10 INJECTION, SOLUTION INTRAMUSCULAR; INTRAVENOUS ONCE
Status: COMPLETED | OUTPATIENT
Start: 2022-02-09 | End: 2022-02-09

## 2022-02-09 RX ORDER — LIDOCAINE HYDROCHLORIDE 10 MG/ML
1 INJECTION, SOLUTION INFILTRATION; PERINEURAL ONCE AS NEEDED
Status: DISCONTINUED | OUTPATIENT
Start: 2022-02-09 | End: 2022-02-10 | Stop reason: HOSPADM

## 2022-02-09 RX ADMIN — FENTANYL CITRATE 50 MCG: 50 INJECTION INTRAMUSCULAR; INTRAVENOUS at 12:32

## 2022-02-09 RX ADMIN — IOPAMIDOL 10 ML: 408 INJECTION, SOLUTION INTRATHECAL at 12:37

## 2022-02-09 RX ADMIN — MIDAZOLAM 1 MG: 1 INJECTION INTRAMUSCULAR; INTRAVENOUS at 12:30

## 2022-02-09 RX ADMIN — DEXAMETHASONE SODIUM PHOSPHATE 10 MG: 10 INJECTION, SOLUTION INTRAMUSCULAR; INTRAVENOUS at 12:37

## 2022-02-09 RX ADMIN — DEXAMETHASONE SODIUM PHOSPHATE 10 MG: 4 INJECTION, SOLUTION INTRA-ARTICULAR; INTRALESIONAL; INTRAMUSCULAR; INTRAVENOUS; SOFT TISSUE at 12:22

## 2022-02-09 NOTE — ADDENDUM NOTE
Addendum  created 02/09/22 1245 by Enmanuel Quiroz MD    Clinical Note Signed, Diagnosis association updated

## 2022-02-09 NOTE — ANESTHESIA PROCEDURE NOTES
PAIN Epidural block      Patient reassessed immediately prior to procedure    Patient location during procedure: pain clinic  Start Time: 2/9/2022 12:22 PM  Stop Time: 2/9/2022 12:42 PM  Indication:procedure for pain  Performed By  Anesthesiologist: Enmanuel Quiroz MD  Preanesthetic Checklist  Completed: patient identified, site marked, risks and benefits discussed, surgical consent, monitors and equipment checked, pre-op evaluation and timeout performed  Additional Notes  Post-Op Diagnosis Codes:     * Degeneration of cervical intervertebral disc (M50.30)     * Spinal stenosis, cervical region (M48.02)     * Previous anterior cervical fusion    The patient was observed in recovery with no evidence of neurological deficits or other problems. All questions were answered. The patient was discharged with appropriate instructions.  Prep:  Pt Position:prone  Sterile Tech:cap, gloves, mask and sterile barrier  Prep:chlorhexidine gluconate and isopropyl alcohol  Monitoring:blood pressure monitoring, continuous pulse oximetry and EKG  Procedure:Sedation: yes (Midazolam 1 mg and fentanyl 50 mcg IV)     Approach:midline  Guidance: fluoroscopy  Location:cervical  Level:6-7 (Interlaminar)  Needle Type:[place] [employer]  Needle Gauge:20 G  Aspiration:negative  Medications:  Preservative Free Saline:3mL  Isovue:2mL  Comments:Isovue dye spread was consistent with epidural placement.  Post Assessment:  Dressing:occlusive dressing applied  Pt Tolerance:patient tolerated the procedure well with no apparent complications  Complications:no

## 2022-02-09 NOTE — H&P
[facility]    History and Physical    Patient Name: Chely Maire  :  1950  MRN:  [id number]  Date of Admission: 2022    Subjective     Patient is a 71 y.o. female presents with chief complaint of chronic, intermitent, severe neck and bilateral shoulder pain.  Onset of symptoms was gradual starting 15 years ago.  Symptoms are associated/aggravated by lifting or sitting. Symptoms improve with pain medication, ice, injection and rest.  She is undergone a previous anterior cervical fusion with instrumentation.  On a pain scale from 0-10, her pain ranges between a 2 and a 10 depending on her level of activity.  She describes the pain as sharp, stabbing and throbbing in nature.  She was referred to the pain clinic for a series of cervical epidural steroid injections.    Her most recent cervical MRI results are as follows:    FINDINGS: The C2-C3 intervertebral disc appears within normal limits  with no canal stenosis.     At C3-C4, there is minimal mixed spondylotic protrusion with minimal  mass effect on the anterior thecal sac. No cord flattening is seen. No  significant foraminal narrowing is seen.     At C4-C5, there is mild (3 mm) retrolisthesis of C4 on C5 with mild  mixed spondylotic protrusion. This partially effaces the anterior thecal  sac, but no cord flattening is seen. There is mild narrowing of the AP  dimension of the spinal canal. There is mild foraminal narrowing. There  is evidence of previous surgery at C5-C6 with partial bony fusion of the  C5 and C6 vertebrae. Minimal posterior central and left paracentral  spondylosis is seen, best seen on axial series 7 images 26 and 27 with  mild left foraminal narrowing. No cord flattening is seen.     At C6-C7, there is minimal mixed spondylotic protrusion with minimal  effacement of the anterior thecal sac. No cord flattening is seen.  Minimal left foraminal narrowing is seen.     C7-T1 level appears relatively normal.     The signal  intensity of the spinal cord appears within normal limits.    The following portions of the patients history were reviewed and updated as appropriate: current medications, allergies, past medical history, past surgical history, past family history, past social history and problem list                Objective     Past Medical History:   Past Medical History:   Diagnosis Date   • Allergies    • Ankle fracture     LEFT   • Clostridioides difficile infection    • DDD (degenerative disc disease), lumbar    • Degeneration of cervical intervertebral disc    • Neck pain    • Osteoporosis    • PONV (postoperative nausea and vomiting)    • Varicose vein      Past Surgical History:   Past Surgical History:   Procedure Laterality Date   • ANKLE OPEN REDUCTION INTERNAL FIXATION Left 10/29/2018    Procedure: LT ANKLE OPEN REDUCTION INTERNAL FIXATION;  Surgeon: Enmanuel Camarena MD;  Location: [facility] OR [facility] [place];  Service: Orthopedics   • ANTERIOR CERVICAL FUSION Right 2005    C5-6; Dr. Ward   • BLADDER SURGERY     • COLONOSCOPY N/A 10/7/2020    Procedure: COLONOSCOPY INTO CECUM WITH COLD BX POLYPECTOMY;  Surgeon: Da Serrano MD;  Location: [facility] ENDOSCOPY;  Service: Gastroenterology;  Laterality: N/A;  PRE:  SCREENING  POST:  POLYP   • HYSTERECTOMY     • LUMBAR DISCECTOMY     • TONSILLECTOMY     • VEIN SURGERY       Family History:   Family History   Problem Relation Age of Onset   • Alzheimer's disease Other    • No Known Problems Mother    • No Known Problems Father    • Malig Hyperthermia Neg Hx      Social History:   Social History     Socioeconomic History   • Marital status:    Tobacco Use   • Smoking status: Never Smoker   • Smokeless tobacco: Never Used   Substance and Sexual Activity   • Alcohol use: Yes     Comment: Social   • Drug use: No   • Sexual activity: Defer       Vital Signs Range for the last 24 hours  Temperature:     Temp Source:     BP:     Pulse:     Respirations:     SPO2:     O2 Amount  (l/min):     O2 Devices     Weight:           --------------------------------------------------------------------------------    Current Outpatient Medications   Medication Sig Dispense Refill   • azelastine (OPTIVAR) 0.05 % ophthalmic solution 1 drop 2 (Two) Times a Day.     • calcium carbonate-cholecalciferol 500-400 MG-UNIT tablet tablet Take  by mouth Daily.     • cetirizine (zyrTEC) 10 MG tablet Take 10 mg by mouth Daily.     • Cholecalciferol (Vitamin D3) 50 MCG (2000 UT) tablet Take  by mouth Daily.     • cyclobenzaprine (FLEXERIL) 10 MG tablet Take 1 tablet by mouth 3 (Three) Times a Day As Needed for Muscle Spasms. 40 tablet 5   • denosumab (Prolia) 60 MG/ML solution prefilled syringe syringe Inject  under the skin into the appropriate area as directed 1 (One) Time.     • estradiol (ESTRACE VAGINAL) 0.1 MG/GM vaginal cream Insert  into the vagina 2 (Two) Times a Week.     • fluticasone (FLONASE) 50 MCG/ACT nasal spray 1 spray into the nostril(s) as directed by provider Daily.     • HYDROcodone-acetaminophen (NORCO) 5-325 MG per tablet Take 1 tablet by mouth See Admin Instructions. Take 1 tablet by mouth every 4-6 hours as needed for pain     • metaxalone (SKELAXIN) 800 MG tablet Take 1 tablet by mouth every 8 (eight) hours as needed.     • methylPREDNISolone (MEDROL) 4 MG dose pack Take as directed on package instructions. 21 tablet 0   • Multiple Vitamins-Minerals (CENTRUM SILVER ADULT 50+) tablet Take 1 tablet by mouth daily.     • Probiotic Product (PROBIOTIC DAILY PO) Take  by mouth Daily.     • promethazine (PHENERGAN) 25 MG tablet Take 25 mg by mouth Every 6 (Six) Hours As Needed.     • vitamin B-12 (CYANOCOBALAMIN) 1000 MCG tablet Take 1,000 mcg by mouth Daily.       No current facility-administered medications for this encounter.       --------------------------------------------------------------------------------  Assessment/Plan      Anesthesia Evaluation     Patient summary reviewed and Nursing  notes reviewed   history of anesthetic complications: PONV  NPO Solid Status: > 8 hours  NPO Liquid Status: > 2 hours    Pain impairs ability to perform ADLs: Meal prep/Eating, Driving, Working and Housekeeping       Airway   Mallampati: II  TM distance: >3 FB  Neck ROM: full  Dental - normal exam     Pulmonary - negative pulmonary ROS and normal exam    breath sounds clear to auscultation  Cardiovascular - negative cardio ROS and normal exam    Rhythm: regular  Rate: normal    (-) angina, orthopnea, PND, LAURA      Neuro/Psych- negative ROS  GI/Hepatic/Renal/Endo - negative ROS     Musculoskeletal     (+) neck pain,   Abdominal    Substance History - negative use     OB/GYN negative ob/gyn ROS         Other   arthritis,               Diagnosis and Plan    Treatment Plan  ASA 2      Procedures: Cervical Epidural Steroid Injection(PETRONA), With fluoroscopy,       Anesthetic plan and risks discussed with patient.        Alternative management options include physical therapy, medical management with nonsteroidal anti-inflammatory medications or narcotics, chiropractic manipulation and surgical intervention.    1.  Cervical epidural steroid injections, up to 3, spaced 1-2 weeks apart.  If pain control is acceptable after 1 or 2 injections, it was discussed with the patient that they may return for the subsequent injections if and when their pain returns.  The risks were discussed with the patient including failure of relief, worsening pain, Headache (post dural puncture headache), bleeding (epidural hematoma) and infection (epidural abscess or skin infection).  2.  Physical therapy exercises at home as prescribed by physical therapy or from the pain clinic handout (given to the patient).  Continuation of these exercises every day, or multiple times per week, even when the patient has good pain relief, was stressed to the patient as a preventative measure to decrease the frequency and severity of future pain episodes.  3.   Continue pain medicines as already prescribed.  If patient not currently taking any, it is recommended to begin Acetaminophen 1000 mg po q 8 hours.  If other medicines containing Acetaminophen are currently prescribed, maintain daily dose at 3000 mg.    4.  If they can tolerate NSAIDS, it is recommended to take Ibuprofen 600 mg po q 6 hours for 7 days during pain exacerbations.  Alternatively, they may substitute an NSAID of their choice (e.g. Aleve).  This may be taken at the same time as Acetaminophen.  5.  Heat and ice to the affected area as tolerated for pain control.  It was discussed that heating pads can cause burns.  6.  Daily low impact exercise such as walking or water exercise was recommended to maintain overall health and aid in weight control.   7.  Follow up as needed for subsequent injections.  8.  Patient was counseled to abstain from tobacco products.    Diagnosis     * Degeneration of cervical intervertebral disc [M50.30]     * Spinal stenosis, cervical region [M48.02]

## 2022-02-09 NOTE — ADDENDUM NOTE
Addendum  created 02/09/22 1305 by Enmanuel Quiroz MD    Order Reconciliation Section accessed, Order list changed

## 2022-03-02 ENCOUNTER — HOSPITAL ENCOUNTER (OUTPATIENT)
Dept: PAIN MEDICINE | Facility: HOSPITAL | Age: 72
Discharge: HOME | End: 2022-03-02

## 2022-03-02 ENCOUNTER — ANESTHESIA (OUTPATIENT)
Dept: PAIN MEDICINE | Facility: HOSPITAL | Age: 72
End: 2022-03-02

## 2022-03-02 ENCOUNTER — HOSPITAL ENCOUNTER (OUTPATIENT)
Dept: GENERAL RADIOLOGY | Facility: HOSPITAL | Age: 72
Discharge: HOME | End: 2022-03-02

## 2022-03-02 ENCOUNTER — ANESTHESIA EVENT (OUTPATIENT)
Dept: PAIN MEDICINE | Facility: HOSPITAL | Age: 72
End: 2022-03-02

## 2022-03-02 VITALS
OXYGEN SATURATION: 96 % | SYSTOLIC BLOOD PRESSURE: 131 MMHG | RESPIRATION RATE: 12 BRPM | HEIGHT: 66 IN | TEMPERATURE: 98 F | HEART RATE: 61 BPM | DIASTOLIC BLOOD PRESSURE: 73 MMHG | WEIGHT: 133 LBS | BODY MASS INDEX: 21.38 KG/M2

## 2022-03-02 DIAGNOSIS — M50.30 DEGENERATION OF CERVICAL INTERVERTEBRAL DISC: ICD-10-CM

## 2022-03-02 DIAGNOSIS — R52 PAIN: ICD-10-CM

## 2022-03-02 PROCEDURE — 25010000002 MIDAZOLAM PER 1 MG: Performed by: ANESTHESIOLOGY

## 2022-03-02 PROCEDURE — 77003 FLUOROGUIDE FOR SPINE INJECT: CPT

## 2022-03-02 PROCEDURE — 25010000002 DEXAMETHASONE PER 1 MG: Performed by: ANESTHESIOLOGY

## 2022-03-02 PROCEDURE — 25010000002 FENTANYL CITRATE (PF) 50 MCG/ML SOLUTION: Performed by: ANESTHESIOLOGY

## 2022-03-02 PROCEDURE — 0 IOPAMIDOL 41 % SOLUTION: Performed by: ANESTHESIOLOGY

## 2022-03-02 RX ORDER — MIDAZOLAM HYDROCHLORIDE 1 MG/ML
1 INJECTION INTRAMUSCULAR; INTRAVENOUS AS NEEDED
Status: DISCONTINUED | OUTPATIENT
Start: 2022-03-02 | End: 2022-03-03 | Stop reason: HOSPADM

## 2022-03-02 RX ORDER — DEXAMETHASONE SODIUM PHOSPHATE 4 MG/ML
INJECTION, SOLUTION INTRA-ARTICULAR; INTRALESIONAL; INTRAMUSCULAR; INTRAVENOUS; SOFT TISSUE
Status: COMPLETED | OUTPATIENT
Start: 2022-03-02 | End: 2022-03-02

## 2022-03-02 RX ORDER — SODIUM CHLORIDE 0.9 % (FLUSH) 0.9 %
3-10 SYRINGE (ML) INJECTION AS NEEDED
Status: DISCONTINUED | OUTPATIENT
Start: 2022-03-02 | End: 2022-03-03 | Stop reason: HOSPADM

## 2022-03-02 RX ORDER — LIDOCAINE HYDROCHLORIDE 10 MG/ML
1 INJECTION, SOLUTION INFILTRATION; PERINEURAL ONCE AS NEEDED
Status: DISCONTINUED | OUTPATIENT
Start: 2022-03-02 | End: 2022-03-03 | Stop reason: HOSPADM

## 2022-03-02 RX ORDER — SODIUM CHLORIDE 0.9 % (FLUSH) 0.9 %
3 SYRINGE (ML) INJECTION EVERY 12 HOURS SCHEDULED
Status: DISCONTINUED | OUTPATIENT
Start: 2022-03-02 | End: 2022-03-03 | Stop reason: HOSPADM

## 2022-03-02 RX ORDER — FENTANYL CITRATE 50 UG/ML
50 INJECTION, SOLUTION INTRAMUSCULAR; INTRAVENOUS AS NEEDED
Status: DISCONTINUED | OUTPATIENT
Start: 2022-03-02 | End: 2022-03-03 | Stop reason: HOSPADM

## 2022-03-02 RX ORDER — SODIUM CHLORIDE 0.9 % (FLUSH) 0.9 %
1-10 SYRINGE (ML) INJECTION AS NEEDED
Status: DISCONTINUED | OUTPATIENT
Start: 2022-03-02 | End: 2022-03-03 | Stop reason: HOSPADM

## 2022-03-02 RX ORDER — DEXAMETHASONE SODIUM PHOSPHATE 10 MG/ML
10 INJECTION, SOLUTION INTRAMUSCULAR; INTRAVENOUS ONCE
Status: COMPLETED | OUTPATIENT
Start: 2022-03-02 | End: 2022-03-02

## 2022-03-02 RX ADMIN — DEXAMETHASONE SODIUM PHOSPHATE 10 MG: 4 INJECTION, SOLUTION INTRA-ARTICULAR; INTRALESIONAL; INTRAMUSCULAR; INTRAVENOUS; SOFT TISSUE at 07:47

## 2022-03-02 RX ADMIN — IOPAMIDOL 10 ML: 408 INJECTION, SOLUTION INTRATHECAL at 08:07

## 2022-03-02 RX ADMIN — FENTANYL CITRATE 50 MCG: 50 INJECTION INTRAMUSCULAR; INTRAVENOUS at 08:01

## 2022-03-02 RX ADMIN — DEXAMETHASONE SODIUM PHOSPHATE 10 MG: 10 INJECTION, SOLUTION INTRAMUSCULAR; INTRAVENOUS at 08:06

## 2022-03-02 RX ADMIN — MIDAZOLAM 1 MG: 1 INJECTION INTRAMUSCULAR; INTRAVENOUS at 08:01

## 2022-03-02 NOTE — INTERVAL H&P NOTE
[facility]  H&P reviewed. No changes since last visit.  Patient states   25-50% improvement since the last procedure/injection.  Presents for PETRONA 2.      Diagnosis     * DDD (degenerative disc disease), cervical [M50.30]     * Radiculopathy, cervical region [M54.12]      Airway assessed since last visit. Airway class equals: 2.

## 2022-03-02 NOTE — ANESTHESIA PROCEDURE NOTES
PAIN Epidural block    Pre-sedation assessment completed: 3/2/2022 7:47 AM    Patient reassessed immediately prior to procedure    Patient location during procedure: pain clinic  Start Time: 3/2/2022 7:47 AM  Stop Time: 3/2/2022 8:06 AM  Indication:procedure for pain  Performed By  Anesthesiologist: Serina Chaidez MD  Preanesthetic Checklist  Completed: patient identified, IV checked, site marked, risks and benefits discussed, surgical consent, monitors and equipment checked, pre-op evaluation and timeout performed  Additional Notes  Fluoro used.    Dx: cervical degeneration, radiculopathy.    Prep:  Pt Position:prone  Sterile Tech:cap, gloves, mask and sterile barrier  Prep:chlorhexidine gluconate and isopropyl alcohol  Monitoring:blood pressure monitoring, continuous pulse oximetry and EKG  Procedure:Sedation: yes     Approach:midline  Guidance: fluoroscopy  Location:cervical  Level:5-6  Needle Type:Tuohy  Needle Gauge:20  Aspiration:negative  Test Dose:negative  Medications:  Preservative Free Saline:3mL  Isovue:2mL  Comments:B/l spread  Post Assessment:  Dressing:occlusive dressing applied  Pt Tolerance:patient tolerated the procedure well with no apparent complications  Complications:no

## 2022-04-11 DIAGNOSIS — M80.00XD AGE-RELATED OSTEOPOROSIS WITH CURRENT PATHOLOGICAL FRACTURE WITH ROUTINE HEALING: Primary | ICD-10-CM

## 2022-04-13 ENCOUNTER — INFUSION (OUTPATIENT)
Dept: ONCOLOGY | Facility: HOSPITAL | Age: 72
End: 2022-04-13

## 2022-04-13 VITALS — RESPIRATION RATE: 18 BRPM | TEMPERATURE: 97.7 F

## 2022-04-13 DIAGNOSIS — M80.00XD AGE-RELATED OSTEOPOROSIS WITH CURRENT PATHOLOGICAL FRACTURE WITH ROUTINE HEALING: Primary | ICD-10-CM

## 2022-04-13 PROCEDURE — 96372 THER/PROPH/DIAG INJ SC/IM: CPT

## 2022-04-13 PROCEDURE — 25010000002 DENOSUMAB 60 MG/ML SOLUTION PREFILLED SYRINGE: Performed by: INTERNAL MEDICINE

## 2022-04-13 RX ADMIN — DENOSUMAB 60 MG: 60 INJECTION SUBCUTANEOUS at 15:10

## 2022-06-03 ENCOUNTER — HOSPITAL ENCOUNTER (OUTPATIENT)
Dept: GENERAL RADIOLOGY | Facility: HOSPITAL | Age: 72
Discharge: HOME | End: 2022-06-03

## 2022-06-03 ENCOUNTER — ANESTHESIA (OUTPATIENT)
Dept: PAIN MEDICINE | Facility: HOSPITAL | Age: 72
End: 2022-06-03

## 2022-06-03 ENCOUNTER — ANESTHESIA EVENT (OUTPATIENT)
Dept: PAIN MEDICINE | Facility: HOSPITAL | Age: 72
End: 2022-06-03

## 2022-06-03 ENCOUNTER — HOSPITAL ENCOUNTER (OUTPATIENT)
Dept: PAIN MEDICINE | Facility: HOSPITAL | Age: 72
Discharge: HOME | End: 2022-06-03

## 2022-06-03 VITALS
SYSTOLIC BLOOD PRESSURE: 158 MMHG | DIASTOLIC BLOOD PRESSURE: 85 MMHG | TEMPERATURE: 96.9 F | RESPIRATION RATE: 14 BRPM | OXYGEN SATURATION: 99 % | HEART RATE: 63 BPM

## 2022-06-03 DIAGNOSIS — M50.30 DEGENERATION OF CERVICAL INTERVERTEBRAL DISC: ICD-10-CM

## 2022-06-03 DIAGNOSIS — R52 PAIN: ICD-10-CM

## 2022-06-03 PROCEDURE — 77003 FLUOROGUIDE FOR SPINE INJECT: CPT

## 2022-06-03 PROCEDURE — 25010000002 DEXAMETHASONE PER 1 MG: Performed by: ANESTHESIOLOGY

## 2022-06-03 PROCEDURE — 0 IOPAMIDOL 41 % SOLUTION: Performed by: ANESTHESIOLOGY

## 2022-06-03 RX ORDER — SODIUM CHLORIDE 0.9 % (FLUSH) 0.9 %
1-10 SYRINGE (ML) INJECTION AS NEEDED
Status: DISCONTINUED | OUTPATIENT
Start: 2022-06-03 | End: 2022-06-04 | Stop reason: HOSPADM

## 2022-06-03 RX ORDER — LIDOCAINE HYDROCHLORIDE 10 MG/ML
1 INJECTION, SOLUTION INFILTRATION; PERINEURAL ONCE AS NEEDED
Status: DISCONTINUED | OUTPATIENT
Start: 2022-06-03 | End: 2022-06-04 | Stop reason: HOSPADM

## 2022-06-03 RX ORDER — DEXAMETHASONE SODIUM PHOSPHATE 10 MG/ML
10 INJECTION, SOLUTION INTRAMUSCULAR; INTRAVENOUS ONCE
Status: COMPLETED | OUTPATIENT
Start: 2022-06-03 | End: 2022-06-03

## 2022-06-03 RX ORDER — MIDAZOLAM HYDROCHLORIDE 1 MG/ML
1 INJECTION INTRAMUSCULAR; INTRAVENOUS AS NEEDED
Status: DISCONTINUED | OUTPATIENT
Start: 2022-06-03 | End: 2022-06-04 | Stop reason: HOSPADM

## 2022-06-03 RX ORDER — FENTANYL CITRATE 50 UG/ML
50 INJECTION, SOLUTION INTRAMUSCULAR; INTRAVENOUS AS NEEDED
Status: DISCONTINUED | OUTPATIENT
Start: 2022-06-03 | End: 2022-06-04 | Stop reason: HOSPADM

## 2022-06-03 RX ORDER — DEXAMETHASONE SODIUM PHOSPHATE 4 MG/ML
INJECTION, SOLUTION INTRA-ARTICULAR; INTRALESIONAL; INTRAMUSCULAR; INTRAVENOUS; SOFT TISSUE
Status: COMPLETED | OUTPATIENT
Start: 2022-06-03 | End: 2022-06-03

## 2022-06-03 RX ADMIN — DEXAMETHASONE SODIUM PHOSPHATE 10 MG: 10 INJECTION, SOLUTION INTRAMUSCULAR; INTRAVENOUS at 09:07

## 2022-06-03 RX ADMIN — IOPAMIDOL 10 ML: 408 INJECTION, SOLUTION INTRATHECAL at 09:07

## 2022-06-03 RX ADMIN — DEXAMETHASONE SODIUM PHOSPHATE 10 MG: 4 INJECTION, SOLUTION INTRA-ARTICULAR; INTRALESIONAL; INTRAMUSCULAR; INTRAVENOUS; SOFT TISSUE at 09:09

## 2022-06-03 NOTE — DISCHARGE INSTRUCTIONS
Cervical epidural steroid injection instructions  Plan includes:  1.  Cervical epidural steroid injections, up to 3, spaced 4 weeks apart.  If pain control is acceptable after 1 or 2 injections, it was discussed with the patient that they may return for the subsequent injections if and when their pain returns.  The risks were discussed with the patient including failure of relief, worsening pain, Headache (post dural puncture headache), bleeding (epidural hematoma) and infection (epidural abscess or skin infection).  2.  Physical therapy exercises at home as prescribed by physical therapy or from the pain clinic handout (given to the patient).  Continuation of these exercises every day, or multiple times per week, even when the patient has good pain relief, was stressed to the patient as a preventative measure to decrease the frequency and severity of future pain episodes.  3.  Continue pain medicines as already prescribed.  If patient not currently taking any, it is recommended to begin Acetaminophen 1000 mg po q 8 hours.  If other medicines containing Acetaminophen are currently prescribed, maintain daily dose at 3000 mg.    4.  If they can tolerate NSAIDS, it is recommended to take Ibuprofen 600 mg po q 6 hours for 7 days during pain exacerbations.  Alternatively, they may substitute an NSAID of their choice (e.g. Aleve).  This may be taken at the same time as Acetaminophen.  5.  Heat and ice to the affected area as tolerated for pain control.  It was discussed that heating pads can cause burns.  6.  Low impact exercise such as walking or water exercise was recommended to maintain overall health and aid in weight control.   7.  Follow up as needed for subsequent injections.  8.  Patient was counseled to abstain from tobacco products. Guide To Relieving And Avoiding Neck Pain    Exercise is important to help prevent and treat neck pain.  Good posture, exercise and avoiding injury will help to keep your neck  "healthy.    When the neck is strained or over worked symptoms may include headache, upper back pain, shoulder pain or arm pain.  Numbness or tingling in the fingers, dizziness or nausea may also occur.    Posture:    Avoid slumping over a desk.  Raise your work (including computer) to eye level to avoid bending at the neck.      Change Position Often:  Changing position prevents overuse of particular muscles.    Sleep On One Pillow:  Using to many pillows or to large of a pillow causes a \"kink\" in you neck.      Move and Exercise:  Living an active lifestyle is an important part of staying healthy.  Be sure to include the exercise to follow specifically for your neck.                    Range of Motion Exercises:  Do these exercises three times a day.  If you experience increased pain stop and contact your physician.  All exercises can be performed sitting or standing.        1.    2.   3.    1.  Place both hands behind you neck.  Gently tilt your neck backward so that you are looking at the ceiling.  Hold for a count of 10.    2.  Look straight facing forward.  Slowly tip your ear toward your right shoulder.  Do not force the motion.  Hold for a count of 10.  Bring head back to starting position and repeat to left side.    3.  Look straight facing forward.  Gently turn your head to the right.  Do not force the motion.  Hold for a count of 10.  Bring head back to starting position and repeat to the left side.    Exercises To Strengthen Muscles:  1.  Look straight facing forward.  Relax your shoulders.  Raise both shoulders toward your ears.  Hold for 3 seconds.       1.       2.   3.      1.  Look straight facing forward.  Relax your shoulders.  Raise both shoulders toward     2.  Raise your ars to your side and bend your elbows.  Squeeze shoulder blades together as you rotate your arms outward.  Hold for 5 seconds.    3.  Look straight facing forward.  Pull your head straight back.  Do not tip or move your jaw.  " Hold for 5 seconds.

## 2022-06-03 NOTE — ANESTHESIA PROCEDURE NOTES
PAIN Epidural block    Pre-sedation assessment completed: 6/3/2022 9:00 AM    Patient reassessed immediately prior to procedure    Patient location during procedure: pain clinic  Start Time: 6/3/2022 9:00 AM  Stop Time: 6/3/2022 9:12 AM  Indication:at surgeon's request and procedure for pain  Performed By  Anesthesiologist: Jacob Irving MD  Preanesthetic Checklist  Completed: patient identified, site marked, risks and benefits discussed, surgical consent, monitors and equipment checked, pre-op evaluation and timeout performed  Additional Notes  Post-Op Diagnosis Codes:     * Cervical radiculopathy (M54.12)    Prep:  Pt Position:prone  Sterile Tech:sterile barrier, mask and gloves  Prep:chlorhexidine gluconate and isopropyl alcohol  Monitoring:blood pressure monitoring, continuous pulse oximetry and EKG  Procedure:Sedation: no     Approach:midline  Guidance: fluoroscopy  Location:cervical  Level:6-7  Needle Gauge:20 G  Aspiration:negative  Test Dose:negative  Medications:  Isovue:2mL    Post Assessment:  Pt Tolerance:patient tolerated the procedure well with no apparent complications  Complications:no

## 2022-06-03 NOTE — ADDENDUM NOTE
Addendum  created 06/03/22 0914 by Jacob Irving MD    Clinical Note Signed, Diagnosis association updated

## 2022-06-03 NOTE — H&P
[facility]    History and Physical    Patient Name: Cheyl Marie  :  1950  MRN:  [id number]  Date of Admission: 6/3/2022    Subjective     Patient is a 71 y.o. female presents with chief complaint of acute neck and shoulder: right pain.  Onset of symptoms was gradual starting several years ago.  Symptoms are associated/aggravated by nothing in particular. Symptoms improve with injection    The following portions of the patients history were reviewed and updated as appropriate: current medications, allergies, past medical history, past surgical history, past family history, past social history and problem list      She reports longstanding neck pain that radiates into the right trapezius.  She has had a 3 level fusion at C4-C7.  She still continues to have discomfort.  Her most recent cervical injection afforded her at least 70% relief of her pain.    Her MRI shows postoperative changes and degeneration.          Objective     Past Medical History:   Past Medical History:   Diagnosis Date   • Allergies    • Ankle fracture     LEFT   • Clostridioides difficile infection    • DDD (degenerative disc disease), lumbar    • Degeneration of cervical intervertebral disc    • Neck pain    • Osteoporosis    • PONV (postoperative nausea and vomiting)    • Varicose vein      Past Surgical History:   Past Surgical History:   Procedure Laterality Date   • ANKLE OPEN REDUCTION INTERNAL FIXATION Left 10/29/2018    Procedure: LT ANKLE OPEN REDUCTION INTERNAL FIXATION;  Surgeon: Enmanuel Camarena MD;  Location: [facility] OR [facility];  Service: Orthopedics   • ANTERIOR CERVICAL FUSION Right     C5-6; Dr. Ward   • BLADDER SURGERY     • COLONOSCOPY N/A 10/7/2020    Procedure: COLONOSCOPY INTO CECUM WITH COLD BX POLYPECTOMY;  Surgeon: Da Serrano MD;  Location: [facility] ENDOSCOPY;  Service: Gastroenterology;  Laterality: N/A;  PRE:  SCREENING  POST:  POLYP   • HYSTERECTOMY     • LUMBAR DISCECTOMY     •  TONSILLECTOMY     • VEIN SURGERY       Family History:   Family History   Problem Relation Age of Onset   • Alzheimer's disease Other    • No Known Problems Mother    • No Known Problems Father    • Malig Hyperthermia Neg Hx      Social History:   Social History     Socioeconomic History   • Marital status:    Tobacco Use   • Smoking status: Never Smoker   • Smokeless tobacco: Never Used   Substance and Sexual Activity   • Alcohol use: Yes     Comment: Social   • Drug use: No   • Sexual activity: Defer       Vital Signs Range for the last 24 hours  Temperature:     Temp Source:     BP:     Pulse:     Respirations:     SPO2:     O2 Amount (l/min):     O2 Devices     Weight:           --------------------------------------------------------------------------------    Current Outpatient Medications   Medication Sig Dispense Refill   • azelastine (OPTIVAR) 0.05 % ophthalmic solution 1 drop 2 (Two) Times a Day.     • calcium carbonate-cholecalciferol 500-400 MG-UNIT tablet tablet Take  by mouth Daily.     • cetirizine (zyrTEC) 10 MG tablet Take 10 mg by mouth Daily.     • Cholecalciferol (Vitamin D3) 50 MCG (2000 UT) tablet Take  by mouth Daily.     • cyclobenzaprine (FLEXERIL) 10 MG tablet Take 1 tablet by mouth 3 (Three) Times a Day As Needed for Muscle Spasms. 40 tablet 5   • denosumab (Prolia) 60 MG/ML solution prefilled syringe syringe Inject  under the skin into the appropriate area as directed 1 (One) Time.     • estradiol (ESTRACE VAGINAL) 0.1 MG/GM vaginal cream Insert  into the vagina 2 (Two) Times a Week.     • fluticasone (FLONASE) 50 MCG/ACT nasal spray 1 spray into the nostril(s) as directed by provider Daily.     • HYDROcodone-acetaminophen (NORCO) 5-325 MG per tablet Take 1 tablet by mouth See Admin Instructions. Take 1 tablet by mouth every 4-6 hours as needed for pain     • metaxalone (SKELAXIN) 800 MG tablet Take 1 tablet by mouth every 8 (eight) hours as needed.     • Multiple Vitamins-Minerals  (CENTRUM SILVER ADULT 50+) tablet Take 1 tablet by mouth daily.     • Probiotic Product (PROBIOTIC DAILY PO) Take  by mouth Daily.     • promethazine (PHENERGAN) 25 MG tablet Take 25 mg by mouth Every 6 (Six) Hours As Needed.     • vitamin B-12 (CYANOCOBALAMIN) 1000 MCG tablet Take 1,000 mcg by mouth Daily.       Current Facility-Administered Medications   Medication Dose Route Frequency Provider Last Rate Last Admin   • dexamethasone sodium phosphate injection 10 mg  10 mg Intravenous Once RenderJacob MD       • fentaNYL citrate (PF) (SUBLIMAZE) injection 50 mcg  50 mcg Intravenous PRN Jacob Irving MD       • iopamidol (ISOVUE-M 200) injection 41%  12 mL Epidural Once in imaging Render, Jacob Sanez MD       • lidocaine (XYLOCAINE) 1 % injection 1 mL  1 mL Intradermal Once PRN Jacob Irving MD       • midazolam (VERSED) injection 1 mg  1 mg Intravenous PRN Render, Jacob Saenz MD       • sodium chloride 0.9 % flush 1-10 mL  1-10 mL Intravenous PRN Jacob Irving MD           --------------------------------------------------------------------------------  Assessment & Plan      Anesthesia Evaluation     history of anesthetic complications: PONV        Pain impairs ability to perform ADLs: Driving, Ambulation and Exercise/Activity       Airway   Mallampati: II  TM distance: >3 FB  Neck ROM: limited  No difficulty expected  Dental      Pulmonary    (-) wheezes  Cardiovascular     Rhythm: regular    (+) murmur,       Neuro/Psych    PE Comment: She has no focal findings in her upper extremities.  GI/Hepatic/Renal/Endo      Musculoskeletal     (+) neck pain,   Abdominal    Substance History      OB/GYN          Other                   Diagnosis and Plan    Treatment Plan  ASA 2   Patient has had previous injection/procedure with 50-75% improvement.   Procedures: Cervical Epidural Steroid Injection(PETRONA), With fluoroscopy,       Anesthetic plan and risks discussed with patient.      Discussed with  "patient risk and benefits of RAÚL including but not limited to : Bleeding, infection, PDPH, inadvertant spinal anesthetic, worsening pain, hyperglycemia, hypertension, CHF, nerve damage, steroid \"toxicity\" and AVN of hips.  Pt agrees to proceed.    Diagnosis     * Cervical radiculopathy [M54.12]                    "

## 2022-09-19 ENCOUNTER — HOSPITAL ENCOUNTER (OUTPATIENT)
Dept: GENERAL RADIOLOGY | Facility: HOSPITAL | Age: 72
Discharge: HOME | End: 2022-09-19

## 2022-09-19 ENCOUNTER — ANESTHESIA EVENT (OUTPATIENT)
Dept: PAIN MEDICINE | Facility: HOSPITAL | Age: 72
End: 2022-09-19

## 2022-09-19 ENCOUNTER — ANESTHESIA (OUTPATIENT)
Dept: PAIN MEDICINE | Facility: HOSPITAL | Age: 72
End: 2022-09-19

## 2022-09-19 ENCOUNTER — HOSPITAL ENCOUNTER (OUTPATIENT)
Dept: PAIN MEDICINE | Facility: HOSPITAL | Age: 72
Discharge: HOME | End: 2022-09-19

## 2022-09-19 VITALS
HEART RATE: 67 BPM | SYSTOLIC BLOOD PRESSURE: 155 MMHG | RESPIRATION RATE: 16 BRPM | TEMPERATURE: 97.7 F | OXYGEN SATURATION: 97 % | DIASTOLIC BLOOD PRESSURE: 83 MMHG

## 2022-09-19 DIAGNOSIS — M50.30 DEGENERATION OF CERVICAL INTERVERTEBRAL DISC: Primary | ICD-10-CM

## 2022-09-19 DIAGNOSIS — M54.2 CERVICAL PAIN (NECK): ICD-10-CM

## 2022-09-19 DIAGNOSIS — R52 PAIN: ICD-10-CM

## 2022-09-19 PROCEDURE — 0 IOPAMIDOL 41 % SOLUTION: Performed by: ANESTHESIOLOGY

## 2022-09-19 PROCEDURE — 77003 FLUOROGUIDE FOR SPINE INJECT: CPT

## 2022-09-19 PROCEDURE — 25010000002 DEXAMETHASONE PER 1 MG: Performed by: ANESTHESIOLOGY

## 2022-09-19 RX ORDER — LIDOCAINE HYDROCHLORIDE 10 MG/ML
1 INJECTION, SOLUTION INFILTRATION; PERINEURAL ONCE AS NEEDED
Status: DISCONTINUED | OUTPATIENT
Start: 2022-09-19 | End: 2022-09-20 | Stop reason: HOSPADM

## 2022-09-19 RX ORDER — SODIUM CHLORIDE 0.9 % (FLUSH) 0.9 %
1-10 SYRINGE (ML) INJECTION AS NEEDED
Status: DISCONTINUED | OUTPATIENT
Start: 2022-09-19 | End: 2022-09-20 | Stop reason: HOSPADM

## 2022-09-19 RX ORDER — MIDAZOLAM HYDROCHLORIDE 1 MG/ML
1 INJECTION INTRAMUSCULAR; INTRAVENOUS AS NEEDED
Status: DISCONTINUED | OUTPATIENT
Start: 2022-09-19 | End: 2022-09-20 | Stop reason: HOSPADM

## 2022-09-19 RX ORDER — DEXAMETHASONE SODIUM PHOSPHATE 4 MG/ML
INJECTION, SOLUTION INTRA-ARTICULAR; INTRALESIONAL; INTRAMUSCULAR; INTRAVENOUS; SOFT TISSUE
Status: COMPLETED | OUTPATIENT
Start: 2022-09-19 | End: 2022-09-19

## 2022-09-19 RX ORDER — FENTANYL CITRATE 50 UG/ML
50 INJECTION, SOLUTION INTRAMUSCULAR; INTRAVENOUS AS NEEDED
Status: DISCONTINUED | OUTPATIENT
Start: 2022-09-19 | End: 2022-09-20 | Stop reason: HOSPADM

## 2022-09-19 RX ORDER — DEXAMETHASONE SODIUM PHOSPHATE 10 MG/ML
10 INJECTION, SOLUTION INTRAMUSCULAR; INTRAVENOUS ONCE
Status: COMPLETED | OUTPATIENT
Start: 2022-09-19 | End: 2022-09-19

## 2022-09-19 RX ADMIN — DEXAMETHASONE SODIUM PHOSPHATE 10 MG: 10 INJECTION, SOLUTION INTRAMUSCULAR; INTRAVENOUS at 09:01

## 2022-09-19 RX ADMIN — IOPAMIDOL 10 ML: 408 INJECTION, SOLUTION INTRATHECAL at 09:01

## 2022-09-19 RX ADMIN — DEXAMETHASONE SODIUM PHOSPHATE 10 MG: 4 INJECTION, SOLUTION INTRA-ARTICULAR; INTRALESIONAL; INTRAMUSCULAR; INTRAVENOUS; SOFT TISSUE at 08:54

## 2022-09-19 NOTE — DISCHARGE INSTRUCTIONS
What can I expect after the procedure?  Follow these instructions at home:  Injection site care  You may remove the bandage (dressing) after 24 hours.  Check your injection site every day for signs of infection. Check for:  Redness, swelling, or pain.  Fluid or blood.  Warmth.  Pus or a bad smell.  Managing pain, stiffness, and swelling  For 24 hours after the procedure:  Avoid using heat on the injection site.  Do not take baths, swim, or use a hot tub. You may take a shower.   If directed, put ice on the injection site. To do this:        Put ice in a plastic bag.  Place a towel between your skin and the bag.  Leave the ice on for 20 minutes, 2-3 times a day.     Activity  NO DRIVING FOR THE REST OF THE DAY IF receiving a sedative during your procedure.  Return to your normal activities the next day as tolerated.  General instructions  The injection site may feel sore.  Take over-the-counter medications as directed on packaging and prescription medicines only as told by your health care provider who prescribes these.  Keep all follow-up visits as told by your health care provider.   Contact a health care provider if:  You have any of these signs of infection:  Redness, swelling, or pain around your injection site.  Fluid or blood coming from your injection site.  Warmth coming from your injection site.  Pus or a bad smell coming from your injection site.  A fever.  You continue to have pain and soreness around the injection site, even after taking over-the-counter pain medicine.  You have severe, sudden, or lasting nausea or vomiting.  Get help right away if:  You have severe pain at the injection site that is not relieved by medicines.  You develop a severe headache or a stiff neck.  You become sensitive to light.  You have any new numbness or weakness in your legs or arms.  You lose control of your bladder or bowel movements.  You have trouble breathing.  Summary  An epidural steroid block is an injection of  steroid medication and numbing medicine that is given into the epidural space.  This injection helps relieve pain caused by an irritated or swollen nerve root. Cervical epidural steroid injection instructions  Plan includes:  1.  Cervical epidural steroid injections, up to 3, spaced 4 weeks apart.  If pain control is acceptable after 1 or 2 injections, it was discussed with the patient that they may return for the subsequent injections if and when their pain returns.  The risks were discussed with the patient including failure of relief, worsening pain, Headache (post dural puncture headache), bleeding (epidural hematoma) and infection (epidural abscess or skin infection).  2.  Physical therapy exercises at home as prescribed by physical therapy or from the pain clinic handout (given to the patient).  Continuation of these exercises every day, or multiple times per week, even when the patient has good pain relief, was stressed to the patient as a preventative measure to decrease the frequency and severity of future pain episodes.  3.  Continue pain medicines as already prescribed.  If patient not currently taking any, it is recommended to begin Acetaminophen 1000 mg po q 8 hours.  If other medicines containing Acetaminophen are currently prescribed, maintain daily dose at 3000 mg.    4.  If they can tolerate NSAIDS, it is recommended to take Ibuprofen 600 mg po q 6 hours for 7 days during pain exacerbations.  Alternatively, they may substitute an NSAID of their choice (e.g. Aleve).  This may be taken at the same time as Acetaminophen.  5.  Heat and ice to the affected area as tolerated for pain control.  It was discussed that heating pads can cause burns.  6.  Low impact exercise such as walking or water exercise was recommended to maintain overall health and aid in weight control.   7.  Follow up as needed for subsequent injections.  8.  Patient was counseled to abstain from tobacco products. Guide To Relieving And  "Avoiding Neck Pain    Exercise is important to help prevent and treat neck pain.  Good posture, exercise and avoiding injury will help to keep your neck healthy.    When the neck is strained or over worked symptoms may include headache, upper back pain, shoulder pain or arm pain.  Numbness or tingling in the fingers, dizziness or nausea may also occur.    Posture:    Avoid slumping over a desk.  Raise your work (including computer) to eye level to avoid bending at the neck.      Change Position Often:  Changing position prevents overuse of particular muscles.    Sleep On One Pillow:  Using to many pillows or to large of a pillow causes a \"kink\" in you neck.      Move and Exercise:  Living an active lifestyle is an important part of staying healthy.  Be sure to include the exercise to follow specifically for your neck.                    Range of Motion Exercises:  Do these exercises three times a day.  If you experience increased pain stop and contact your physician.  All exercises can be performed sitting or standing.        1.    2.   3.    1.  Place both hands behind you neck.  Gently tilt your neck backward so that you are looking at the ceiling.  Hold for a count of 10.    2.  Look straight facing forward.  Slowly tip your ear toward your right shoulder.  Do not force the motion.  Hold for a count of 10.  Bring head back to starting position and repeat to left side.    3.  Look straight facing forward.  Gently turn your head to the right.  Do not force the motion.  Hold for a count of 10.  Bring head back to starting position and repeat to the left side.    Exercises To Strengthen Muscles:  1.  Look straight facing forward.  Relax your shoulders.  Raise both shoulders toward your ears.  Hold for 3 seconds.       1.       2.   3.      1.  Look straight facing forward.  Relax your shoulders.  Raise both shoulders toward     2.  Raise your ars to your side and bend your elbows.  Squeeze shoulder blades together as " you rotate your arms outward.  Hold for 5 seconds.    3.  Look straight facing forward.  Pull your head straight back.  Do not tip or move your jaw.  Hold for 5 seconds.

## 2022-09-19 NOTE — ANESTHESIA PROCEDURE NOTES
PAIN Epidural block    Pre-sedation assessment completed: 9/19/2022 8:54 AM    Patient reassessed immediately prior to procedure    Patient location during procedure: pain clinic  Start Time: 9/19/2022 8:54 AM  Stop Time: 9/19/2022 9:03 AM  Indication:procedure for pain  Performed By  Anesthesiologist: Serina Chaidez MD  Preanesthetic Checklist  Completed: patient identified, IV checked, site marked, risks and benefits discussed, surgical consent, monitors and equipment checked, pre-op evaluation and timeout performed  Additional Notes  Fluoro used.    Dx: cervical radiculopathy, degeneration, neck pain  Prep:  Pt Position:prone  Sterile Tech:cap, gloves, mask and sterile barrier  Prep:chlorhexidine gluconate and isopropyl alcohol  Monitoring:blood pressure monitoring, continuous pulse oximetry and EKG  Procedure:Sedation: no     Approach:midline  Guidance: fluoroscopy  Location:cervical  Level:5-6  Needle Type:Tuohy  Needle Gauge:20  Aspiration:negative  Medications:  Preservative Free Saline:3mL  Isovue:2mL  Comments:B/l spread  Post Assessment:  Dressing:occlusive dressing applied  Pt Tolerance:patient tolerated the procedure well with no apparent complications  Complications:no

## 2022-09-28 ENCOUNTER — IMMUNIZATION (OUTPATIENT)
Dept: VACCINE CLINIC | Facility: HOSPITAL | Age: 72
End: 2022-09-28

## 2022-09-28 DIAGNOSIS — Z23 NEED FOR VACCINATION: Primary | ICD-10-CM

## 2022-09-28 PROCEDURE — 0124A: CPT | Performed by: INTERNAL MEDICINE

## 2022-09-28 PROCEDURE — 91312 HC SARSCOV2 VAC 30MCG/0.3ML IM BIVALENT BOOSTER 12 YRS AND OLDER: CPT | Performed by: INTERNAL MEDICINE

## 2022-10-18 ENCOUNTER — INFUSION (OUTPATIENT)
Dept: ONCOLOGY | Facility: HOSPITAL | Age: 72
End: 2022-10-18

## 2022-10-18 VITALS — HEIGHT: 66 IN | BODY MASS INDEX: 21.47 KG/M2 | RESPIRATION RATE: 18 BRPM | TEMPERATURE: 96.6 F

## 2022-10-18 DIAGNOSIS — M80.00XD AGE-RELATED OSTEOPOROSIS WITH CURRENT PATHOLOGICAL FRACTURE WITH ROUTINE HEALING: Primary | ICD-10-CM

## 2022-10-18 PROCEDURE — 25010000002 DENOSUMAB 60 MG/ML SOLUTION PREFILLED SYRINGE: Performed by: INTERNAL MEDICINE

## 2022-10-18 PROCEDURE — 96372 THER/PROPH/DIAG INJ SC/IM: CPT

## 2022-10-18 RX ADMIN — DENOSUMAB 60 MG: 60 INJECTION SUBCUTANEOUS at 15:08

## 2022-10-18 NOTE — NURSING NOTE
Arrived for prolia injection. Indication and side effects reviewed. Denies recent dental work. Labs and medications verified. Prolia administered in right arm without incidence. Instructed to call prescribing MD for any concerns or questions and instructed on how to schedule future appts.  Pt vu and discharged in stable condition.

## 2022-11-29 ENCOUNTER — TELEPHONE (OUTPATIENT)
Dept: PAIN MEDICINE | Facility: HOSPITAL | Age: 72
End: 2022-11-29

## 2022-11-29 ENCOUNTER — TELEPHONE (OUTPATIENT)
Dept: NEUROSURGERY | Facility: CLINIC | Age: 72
End: 2022-11-29

## 2022-11-29 NOTE — TELEPHONE ENCOUNTER
PT  CALLED ASKING PAIN DEPT FOR INFO ON SELF PAYING FOR A CERVICAL EPIDURAL. I HAVE ASKED FIN OFFICE TO CALL THEM TO GIVE A QUOTE.

## 2022-11-29 NOTE — TELEPHONE ENCOUNTER
Patient's  called and stated his wife is needing another order for epidural shots. She normally gets 4 a year.

## 2022-11-29 NOTE — TELEPHONE ENCOUNTER
Please let him know, that she will need to come in to review the MRI and evaluate before placing orders for injections.  It looks like I had placed some back in January, but it has been almost a year since then.

## 2022-12-01 ENCOUNTER — TELEPHONE (OUTPATIENT)
Dept: NEUROSURGERY | Facility: CLINIC | Age: 72
End: 2022-12-01

## 2022-12-01 NOTE — TELEPHONE ENCOUNTER
Caller: Abhay Marie    Relationship to patient: Emergency Contact    Best call back number: [phone]    Patient is needing: PATIENTS  CALLED, STATES PAIN MANAGEMENT HAS NOT RECEIVED THE ORDER FOR 4 INJECTIONS IN 2023. PATIENTS  IS REQUESTING WE CREATE THE ORDER FOR THE NEXT 4 INJECTIONS SO HE CAN SCHEDULE THEM FOR HIS WIFE. PLEASE CALL THE PATIENTS  TO ADVISE IT HAS BEEN SENT.    THANK YOU

## 2022-12-06 ENCOUNTER — OFFICE VISIT (OUTPATIENT)
Dept: NEUROSURGERY | Facility: CLINIC | Age: 72
End: 2022-12-06

## 2022-12-06 VITALS
DIASTOLIC BLOOD PRESSURE: 68 MMHG | WEIGHT: 133 LBS | HEIGHT: 66 IN | BODY MASS INDEX: 21.38 KG/M2 | SYSTOLIC BLOOD PRESSURE: 122 MMHG

## 2022-12-06 DIAGNOSIS — Z98.1 HISTORY OF FUSION OF CERVICAL SPINE: ICD-10-CM

## 2022-12-06 DIAGNOSIS — M80.00XD AGE-RELATED OSTEOPOROSIS WITH CURRENT PATHOLOGICAL FRACTURE WITH ROUTINE HEALING: ICD-10-CM

## 2022-12-06 DIAGNOSIS — M54.2 CERVICAL PAIN (NECK): Primary | ICD-10-CM

## 2022-12-06 PROCEDURE — 99214 OFFICE O/P EST MOD 30 MIN: CPT | Performed by: NURSE PRACTITIONER

## 2022-12-06 NOTE — PROGRESS NOTES
"Subjective   History of Present Illness: Chely Marie is a 72 y.o. female is here today for follow-up after having cervical epidurals. Last visit 6/2021,she reported that she had a flare up in November 2020 and medrol dose pack was ordered. States that she did take the medrol dose pack that was given, at her last visit.  he states that she starts with skelaxin, flexeril and then hydrocodone, and when those stop helping, she takes the MDP.  She states that she walks outside and tries to do keep herself active.  She has had RAÚL injections, in the past and her last injection was 9/19/2022. She has had a past cervical surgery with Dr. Padgett in 2006.      Today, Ms. Marie reports she received moderate temporary relief from the injections. She is here to discuss getting more injections. She states that she is having more \"neck attacks.\"  She is scheduled for another injection soon.  She can get up to 4 injections per year.  She states that she feels nothing has changed since she had her last imaging.  She states that she takes hydrocodone for pain, and states the she is then in bed for most of the day.  In addition, the pain medication is not good for her memory, and she is having increased memory loss r/t this. She has been seeing Liyah, PT at [facility] once a month for several years.  This has helped until she got C-diff, and she has not recovered her muscle mass.  She is getting tired of continuing with the shots, and the pain is causing her to miss out on getting together with friends.   is wanting to see if her imaging has changed.  She does not have the stamina to keep up with the PT and this is concerning.  Her  states the shots help, but the duration is shorter.  He is having to private pay for these injections.      She states that she not have any loss of strength, but for example she will open a can, with a can opener and for about 2 hours after doing that she will have pain " "in her arms.  She feels she can no longer continue to go on with pain, as she feels she is missing out on life events, and she is wanting to do something more permanent than just epidural injections.  She states that her she has discussed surgery, in the past with Dr. Hutchinson, and they are aware that there may be some adjacent level pain, worsening after the surgery.  She currently c/o pain in r/l and shoulder with neck pain and is worse on the right.  She rates her pain 0/0, presently but with activity, pain increases to 4/10 and keeps going up.  She is not lifting more than about 5-10 pounds.  She denies any numbness/tingling in her upper extremities.    While in the room and during my examination of the patient I wore a mask and eye protection.  I washed my hands before and after this patient encounter.  The patient was also wearing a mask.    History of Present Illness    Tobacco Use: Low Risk    • Smoking Tobacco Use: Never   • Smokeless Tobacco Use: Never   • Passive Exposure: Not on file        The following portions of the patient's history were reviewed and updated as appropriate: allergies, current medications, past family history, past medical history, past social history, past surgical history and problem list.    Review of Systems   Constitutional: Negative for activity change.   All other systems reviewed and are negative.      Objective     Vitals:    12/06/22 1454   BP: 122/68   Weight: 60.3 kg (133 lb)   Height: 167.6 cm (66\")     Body mass index is 21.47 kg/m².      Physical Exam  Vitals and nursing note reviewed.   Constitutional:       Appearance: Normal appearance. She is well-developed and well-groomed.   HENT:      Head: Normocephalic and atraumatic.      Mouth/Throat:      Mouth: Mucous membranes are moist.   Eyes:      Conjunctiva/sclera: Conjunctivae normal.   Neck:      Comments: No pain with palpation over the cervical spine, or shoulders  Cardiovascular:      Rate and Rhythm: Normal " rate.      Pulses: Normal pulses.   Pulmonary:      Effort: Pulmonary effort is normal.      Breath sounds: Normal air entry.   Abdominal:      General: Bowel sounds are normal.   Musculoskeletal:         General: Normal range of motion.      Cervical back: Normal range of motion and neck supple.      Right lower leg: No edema.      Left lower leg: No edema.   Skin:     General: Skin is warm and dry.   Neurological:      Mental Status: She is alert and oriented to person, place, and time.      GCS: GCS eye subscore is 4. GCS verbal subscore is 5. GCS motor subscore is 6.      Sensory: Sensation is intact.      Motor: Motor strength is normal. Motor function is intact.      Coordination: Coordination is intact.      Gait: Gait is intact.      Deep Tendon Reflexes:      Reflex Scores:       Tricep reflexes are 2+ on the right side and 2+ on the left side.       Bicep reflexes are 2+ on the right side and 2+ on the left side.       Brachioradialis reflexes are 3+ on the right side and 3+ on the left side.  Psychiatric:         Attention and Perception: Attention normal.         Mood and Affect: Mood normal.         Speech: Speech normal.         Behavior: Behavior normal. Behavior is cooperative.         Thought Content: Thought content normal.         Cognition and Memory: Cognition normal.         Judgment: Judgment normal.       Neurologic Exam     Mental Status   Oriented to person, place, and time.   Speech: speech is normal   Level of consciousness: alert    Motor Exam   Muscle bulk: normal  Overall muscle tone: normal    Strength   Strength 5/5 throughout.     Sensory Exam   Light touch normal.     Gait, Coordination, and Reflexes     Gait  Gait: normal    Reflexes   Right brachioradialis: 3+  Left brachioradialis: 3+  Right biceps: 2+  Left biceps: 2+  Right triceps: 2+  Left triceps: 2+  Right Parra: absent  Left Parra: absent        Assessment & Plan   Independent Review of Radiographic Studies:      No  new imaging    Medical Decision Making:    I have explained that she can continue epidural injections, as needed, she can continue with physical therapy, at [facility] once [facility], and have discussed ordering more frequently, but she states that related to the pain she is unable to have physical therapy more than once a month.  In addition, I have ordered an MRI of the cervical spine just to reevaluate, and determine appropriate treatment plan.  She will follow-up in approximately 2 to 3 months with Dr. Hutchinson, to discuss possible surgical intervention.  I have explained that she has any increased, intractable pain that she should go to the ER.  She should call for any questions or concerns that may arise.  In addition, I have explained to take a half of a pain tablet, and wait 30 minutes to 1 hour before taking the other half, as she states that the pain medication is debilitating and she is sometimes out of commission for approximately 3 days.  I have also explained that she can use heat/ice, and no freeze/Voltaren for pain as needed.  She and her  are in agreement with this plan.      Return in about 2 months (around 2/6/2023) for Dr. Hutchinson, after injections, with new MRI.    Diagnoses and all orders for this visit:    1. Cervical pain (neck) (Primary)  -     Epidural Block  -     MRI Lumbar Spine With & Without Contrast; Future    2. History of fusion of cervical spine  -     Epidural Block  -     MRI Lumbar Spine With & Without Contrast; Future    3. Age-related osteoporosis with current pathological fracture with routine healing  -     Epidural Block  -     MRI Lumbar Spine With & Without Contrast; Future

## 2022-12-16 ENCOUNTER — ANESTHESIA (OUTPATIENT)
Dept: PAIN MEDICINE | Facility: HOSPITAL | Age: 72
End: 2022-12-16

## 2022-12-16 ENCOUNTER — HOSPITAL ENCOUNTER (OUTPATIENT)
Dept: GENERAL RADIOLOGY | Facility: HOSPITAL | Age: 72
Discharge: HOME | End: 2022-12-16

## 2022-12-16 ENCOUNTER — HOSPITAL ENCOUNTER (OUTPATIENT)
Dept: PAIN MEDICINE | Facility: HOSPITAL | Age: 72
Discharge: HOME | End: 2022-12-16

## 2022-12-16 ENCOUNTER — ANESTHESIA EVENT (OUTPATIENT)
Dept: PAIN MEDICINE | Facility: HOSPITAL | Age: 72
End: 2022-12-16

## 2022-12-16 VITALS
DIASTOLIC BLOOD PRESSURE: 84 MMHG | HEART RATE: 64 BPM | SYSTOLIC BLOOD PRESSURE: 140 MMHG | RESPIRATION RATE: 16 BRPM | TEMPERATURE: 98.6 F | OXYGEN SATURATION: 98 %

## 2022-12-16 DIAGNOSIS — R52 PAIN: ICD-10-CM

## 2022-12-16 DIAGNOSIS — M54.2 CERVICAL PAIN (NECK): Primary | ICD-10-CM

## 2022-12-16 DIAGNOSIS — M50.30 DEGENERATION OF CERVICAL INTERVERTEBRAL DISC: ICD-10-CM

## 2022-12-16 PROCEDURE — 0 IOPAMIDOL 41 % SOLUTION: Performed by: ANESTHESIOLOGY

## 2022-12-16 PROCEDURE — 25010000002 METHYLPREDNISOLONE PER 80 MG: Performed by: ANESTHESIOLOGY

## 2022-12-16 PROCEDURE — 77003 FLUOROGUIDE FOR SPINE INJECT: CPT

## 2022-12-16 RX ORDER — LIDOCAINE HYDROCHLORIDE 10 MG/ML
1 INJECTION, SOLUTION INFILTRATION; PERINEURAL ONCE AS NEEDED
Status: DISCONTINUED | OUTPATIENT
Start: 2022-12-16 | End: 2022-12-17 | Stop reason: HOSPADM

## 2022-12-16 RX ORDER — METHYLPREDNISOLONE ACETATE 80 MG/ML
80 INJECTION, SUSPENSION INTRA-ARTICULAR; INTRALESIONAL; INTRAMUSCULAR; SOFT TISSUE ONCE
Status: COMPLETED | OUTPATIENT
Start: 2022-12-16 | End: 2022-12-16

## 2022-12-16 RX ORDER — SODIUM CHLORIDE 0.9 % (FLUSH) 0.9 %
1-10 SYRINGE (ML) INJECTION AS NEEDED
Status: DISCONTINUED | OUTPATIENT
Start: 2022-12-16 | End: 2022-12-17 | Stop reason: HOSPADM

## 2022-12-16 RX ORDER — FENTANYL CITRATE 50 UG/ML
50 INJECTION, SOLUTION INTRAMUSCULAR; INTRAVENOUS AS NEEDED
Status: DISCONTINUED | OUTPATIENT
Start: 2022-12-16 | End: 2022-12-17 | Stop reason: HOSPADM

## 2022-12-16 RX ORDER — MIDAZOLAM HYDROCHLORIDE 1 MG/ML
1 INJECTION INTRAMUSCULAR; INTRAVENOUS AS NEEDED
Status: DISCONTINUED | OUTPATIENT
Start: 2022-12-16 | End: 2022-12-17 | Stop reason: HOSPADM

## 2022-12-16 RX ADMIN — METHYLPREDNISOLONE ACETATE 80 MG: 80 INJECTION, SUSPENSION INTRA-ARTICULAR; INTRALESIONAL; INTRAMUSCULAR; SOFT TISSUE at 09:49

## 2022-12-16 RX ADMIN — IOPAMIDOL 10 ML: 408 INJECTION, SOLUTION INTRATHECAL at 09:49

## 2022-12-16 NOTE — H&P
[facility]    History and Physical    Patient Name: Chely Marie  :  1950  MRN:  [id number]  Date of Admission: 2022    Subjective     Patient is a 72 y.o. female presents with chief complaint of chronic neck and shoulder: bilateral pain.  Onset of symptoms was gradual starting several months ago.  Symptoms are associated/aggravated by nothing in particular. Symptoms improve with injection    The following portions of the patients history were reviewed and updated as appropriate: current medications, allergies, past medical history, past surgical history, past family history, past social history and problem list  She reports neck pain that radiates into both of her shoulders but not into her arms.  She has had epidural steroid injections in the past which she says were very helpful.  She has an MRI which shows multiple levels of degeneration and spondylosis in her cervical spine.  She has been evaluated by neurosurgery has recommended epidural injections.              Objective     Past Medical History:   Past Medical History:   Diagnosis Date   • Allergies    • Ankle fracture     LEFT   • Clostridioides difficile infection    • DDD (degenerative disc disease), lumbar    • Degeneration of cervical intervertebral disc    • Neck pain    • Osteoporosis    • PONV (postoperative nausea and vomiting)    • Varicose vein      Past Surgical History:   Past Surgical History:   Procedure Laterality Date   • ANKLE OPEN REDUCTION INTERNAL FIXATION Left 10/29/2018    Procedure: LT ANKLE OPEN REDUCTION INTERNAL FIXATION;  Surgeon: Enmanuel Camarena MD;  Location: [facility] OR [facility] [place];  Service: Orthopedics   • ANTERIOR CERVICAL FUSION Right     C5-6; Dr. Ward   • BLADDER SURGERY     • COLONOSCOPY N/A 10/07/2020    Procedure: COLONOSCOPY INTO CECUM WITH COLD BX POLYPECTOMY;  Surgeon: Da Serrano MD;  Location: [facility] ENDOSCOPY;  Service: Gastroenterology;  Laterality: N/A;  PRE:   SCREENING  POST:  POLYP   • EPIDURAL BLOCK     • HYSTERECTOMY     • LUMBAR DISCECTOMY     • TONSILLECTOMY     • VEIN SURGERY       Family History:   Family History   Problem Relation Age of Onset   • Alzheimer's disease Other    • No Known Problems Mother    • No Known Problems Father    • Malig Hyperthermia Neg Hx      Social History:   Social History     Socioeconomic History   • Marital status:    Tobacco Use   • Smoking status: Never   • Smokeless tobacco: Never   Substance and Sexual Activity   • Alcohol use: Yes     Comment: Social   • Drug use: No   • Sexual activity: Defer       Vital Signs Range for the last 24 hours  Temperature: Temp:  [37 °C (98.6 °F)] 37 °C (98.6 °F)   Temp Source: Temp src: Infrared   BP: BP: (114)/(96) 114/96   Pulse: Heart Rate:  [74] 74   Respirations: Resp:  [16] 16   SPO2: SpO2:  [98 %] 98 %   O2 Amount (l/min):     O2 Devices Device (Oxygen Therapy): room air   Weight:           --------------------------------------------------------------------------------    Current Outpatient Medications   Medication Sig Dispense Refill   • cetirizine (zyrTEC) 10 MG tablet Take 10 mg by mouth Daily.     • Cholecalciferol (Vitamin D3) 50 MCG (2000 UT) tablet Take  by mouth Daily.     • cyclobenzaprine (FLEXERIL) 10 MG tablet Take 1 tablet by mouth 3 (Three) Times a Day As Needed for Muscle Spasms. 40 tablet 5   • fluticasone (FLONASE) 50 MCG/ACT nasal spray 1 spray into the nostril(s) as directed by provider Daily.     • HYDROcodone-acetaminophen (NORCO) 5-325 MG per tablet Take 1 tablet by mouth See Admin Instructions. Take 1 tablet by mouth every 4-6 hours as needed for pain     • metaxalone (SKELAXIN) 800 MG tablet Take 1 tablet by mouth every 8 (eight) hours as needed.     • Multiple Vitamins-Minerals (CENTRUM SILVER ADULT 50+) tablet Take 1 tablet by mouth daily.     • Probiotic Product (PROBIOTIC DAILY PO) Take  by mouth Daily.     • promethazine (PHENERGAN) 25 MG tablet Take 25  mg by mouth Every 6 (Six) Hours As Needed.     • vitamin B-12 (CYANOCOBALAMIN) 1000 MCG tablet Take 1,000 mcg by mouth Daily.     • denosumab (Prolia) 60 MG/ML solution prefilled syringe syringe Inject  under the skin into the appropriate area as directed 1 (One) Time.       Current Facility-Administered Medications   Medication Dose Route Frequency Provider Last Rate Last Admin   • fentaNYL citrate (PF) (SUBLIMAZE) injection 50 mcg  50 mcg Intravenous PRN RenderJacob MD       • iopamidol (ISOVUE-M 200) injection 41%  12 mL Epidural Once in imaging Jacob Irving MD       • lidocaine (XYLOCAINE) 1 % injection 1 mL  1 mL Intradermal Once PRN Jacob Irving MD       • methylPREDNISolone acetate (DEPO-medrol) injection 80 mg  80 mg Intra-articular Once Render, Jacob Saenz MD       • midazolam (VERSED) injection 1 mg  1 mg Intravenous PRN Render, Jacob Saenz MD       • sodium chloride 0.9 % flush 1-10 mL  1-10 mL Intravenous PRN Jacob Irving MD           --------------------------------------------------------------------------------  Assessment & Plan      Anesthesia Evaluation     history of anesthetic complications:        Pain impairs ability to perform ADLs: Exercise/Activity       Airway   Mallampati: I  TM distance: >3 FB  Neck ROM: full  No difficulty expected  Dental      Pulmonary    (-) wheezes  Cardiovascular     Rhythm: regular      PE comment: Radial pulses are palpable bilaterally    Neuro/Psych  (-) normal sensory deficit  GI/Hepatic/Renal/Endo      Musculoskeletal     (+) neck pain,       PE comment: No focal weakness in her upper extremities.   strength is equal bilaterally.  Abdominal    Substance History      OB/GYN          Other                   Diagnosis and Plan    Treatment Plan  ASA 3   Patient has had previous injection/procedure   Procedures: Cervical Epidural Steroid Injection(PETRONA), With fluoroscopy,       Anesthetic plan and risks discussed with  "patient.      Discussed with patient risk and benefits of RAÚL including but not limited to : Bleeding, infection, PDPH, inadvertant spinal anesthetic, worsening pain, hyperglycemia, hypertension, CHF, nerve damage, steroid \"toxicity\" and AVN of hips.  Pt agrees to proceed.    Diagnosis     * Cervical radiculopathy [M54.12]                    "

## 2022-12-16 NOTE — ANESTHESIA PROCEDURE NOTES
PAIN Epidural block    Pre-sedation assessment completed: 12/16/2022 9:44 AM    Patient reassessed immediately prior to procedure    Patient location during procedure: pain clinic  Start Time: 12/16/2022 9:45 AM  Stop Time: 12/16/2022 9:53 AM  Indication:at surgeon's request and procedure for pain  Performed By  Anesthesiologist: Jacob Irving MD  Preanesthetic Checklist  Completed: patient identified, site marked, risks and benefits discussed, surgical consent, monitors and equipment checked, pre-op evaluation and timeout performed  Additional Notes  Post-Op Diagnosis Codes:     * Cervical radiculopathy (M54.12)    Prep:  Pt Position:prone  Sterile Tech:sterile barrier, mask and gloves  Prep:chlorhexidine gluconate and isopropyl alcohol  Monitoring:blood pressure monitoring, continuous pulse oximetry and EKG  Procedure:Sedation: no     Approach:midline  Guidance: fluoroscopy  Location:cervical  Level:C7-T1  Needle Gauge:20 G  Aspiration:negative  Test Dose:negative  Medications:  Isovue:2mL  Depomedrol:80mg  Post Assessment:  Pt Tolerance:patient tolerated the procedure well with no apparent complications  Complications:no

## 2023-01-06 ENCOUNTER — TELEPHONE (OUTPATIENT)
Dept: NEUROSURGERY | Facility: CLINIC | Age: 73
End: 2023-01-06

## 2023-01-06 DIAGNOSIS — M50.30 DEGENERATION OF CERVICAL INTERVERTEBRAL DISC: Primary | ICD-10-CM

## 2023-01-06 DIAGNOSIS — Z98.1 HISTORY OF FUSION OF CERVICAL SPINE: ICD-10-CM

## 2023-01-06 DIAGNOSIS — M54.2 CERVICAL PAIN (NECK): ICD-10-CM

## 2023-01-06 NOTE — TELEPHONE ENCOUNTER
Thank you for catching this.  I have ordered the Cervical MRI, and I am in the process of trying to delete the lumbar MRI.  Sorry for the inconvenience.

## 2023-01-06 NOTE — TELEPHONE ENCOUNTER
Lina Avendano Patient       MRI needs to be updated with correct orders    Mellisa HARDEN [place] [phone]

## 2023-01-07 ENCOUNTER — HOSPITAL ENCOUNTER (OUTPATIENT)
Dept: MRI IMAGING | Facility: HOSPITAL | Age: 73
Discharge: HOME | End: 2023-01-07
Admitting: NURSE PRACTITIONER
Payer: MEDICARE

## 2023-01-07 DIAGNOSIS — M50.30 DEGENERATION OF CERVICAL INTERVERTEBRAL DISC: ICD-10-CM

## 2023-01-07 DIAGNOSIS — M54.2 CERVICAL PAIN (NECK): ICD-10-CM

## 2023-01-07 DIAGNOSIS — Z98.1 HISTORY OF FUSION OF CERVICAL SPINE: ICD-10-CM

## 2023-01-07 LAB — CREAT BLDA-MCNC: 1.1 MG/DL (ref 0.6–1.3)

## 2023-01-07 PROCEDURE — 0 GADOBENATE DIMEGLUMINE 529 MG/ML SOLUTION: Performed by: NURSE PRACTITIONER

## 2023-01-07 PROCEDURE — 82565 ASSAY OF CREATININE: CPT

## 2023-01-07 PROCEDURE — 72156 MRI NECK SPINE W/O & W/DYE: CPT

## 2023-01-07 PROCEDURE — A9577 INJ MULTIHANCE: HCPCS | Performed by: NURSE PRACTITIONER

## 2023-01-07 RX ADMIN — GADOBENATE DIMEGLUMINE 12 ML: 529 INJECTION, SOLUTION INTRAVENOUS at 09:30

## 2023-01-09 ENCOUNTER — TELEPHONE (OUTPATIENT)
Dept: NEUROSURGERY | Facility: CLINIC | Age: 73
End: 2023-01-09
Payer: MEDICARE

## 2023-01-09 RX ORDER — CYCLOBENZAPRINE HCL 10 MG
10 TABLET ORAL 3 TIMES DAILY PRN
Qty: 20 TABLET | Refills: 0 | Status: SHIPPED | OUTPATIENT
Start: 2023-01-09 | End: 2023-04-06

## 2023-01-09 NOTE — TELEPHONE ENCOUNTER
Spoke with her , on the phone, who was concerned about going on a trip soon to [place], and then getting on an airplane 4 hours after returning from [place].  I had explained that I can give her an order for Flexeril, as it did not look like she had any recent orders.  I explained that she could take half to 1 tablet of the Flexeril 1-3 times daily, as needed for tightness in her neck.  In addition, I explained he could get over-the-counter Voltaren gel/cream to use as well.  I have also explained that he could use a cervical pillow while in the car, and on the plane that might help keep her head up and keep her neck from falling back, or sideways while she is in the car, on the plane.  In addition, I explained he could use ice packs to help to with any inflammation.  He will call if he has any further questions or concerns that may arise.  We went over her cervical MRI, as well, the official reading was not finished by the radiologist.

## 2023-01-11 NOTE — TELEPHONE ENCOUNTER
Patients  called to confirm that Voltaren arthritis gel and cream the box and wants to confirm that this is the mediation to use for his.

## 2023-01-12 NOTE — TELEPHONE ENCOUNTER
I called and spoke with the patients  and advised him per \"Yes, please tell him voltaren cream, or gel can be used.  Thank you! \" He voiced understanding.

## 2023-02-10 ENCOUNTER — ANESTHESIA (OUTPATIENT)
Dept: PAIN MEDICINE | Facility: HOSPITAL | Age: 73
End: 2023-02-10
Payer: MEDICARE

## 2023-02-10 ENCOUNTER — HOSPITAL ENCOUNTER (OUTPATIENT)
Dept: PAIN MEDICINE | Facility: HOSPITAL | Age: 73
Discharge: HOME | End: 2023-02-10
Payer: MEDICARE

## 2023-02-10 ENCOUNTER — ANESTHESIA EVENT (OUTPATIENT)
Dept: PAIN MEDICINE | Facility: HOSPITAL | Age: 73
End: 2023-02-10
Payer: MEDICARE

## 2023-02-10 ENCOUNTER — HOSPITAL ENCOUNTER (OUTPATIENT)
Dept: GENERAL RADIOLOGY | Facility: HOSPITAL | Age: 73
Discharge: HOME | End: 2023-02-10
Payer: MEDICARE

## 2023-02-10 VITALS
SYSTOLIC BLOOD PRESSURE: 131 MMHG | RESPIRATION RATE: 16 BRPM | HEART RATE: 71 BPM | DIASTOLIC BLOOD PRESSURE: 76 MMHG | TEMPERATURE: 97.7 F | OXYGEN SATURATION: 97 %

## 2023-02-10 DIAGNOSIS — R52 PAIN: ICD-10-CM

## 2023-02-10 DIAGNOSIS — M54.2 CERVICAL PAIN (NECK): ICD-10-CM

## 2023-02-10 DIAGNOSIS — M50.30 DEGENERATION OF CERVICAL INTERVERTEBRAL DISC: Primary | ICD-10-CM

## 2023-02-10 PROCEDURE — 0 IOPAMIDOL 41 % SOLUTION: Performed by: ANESTHESIOLOGY

## 2023-02-10 PROCEDURE — 25010000002 METHYLPREDNISOLONE PER 80 MG: Performed by: ANESTHESIOLOGY

## 2023-02-10 PROCEDURE — 77003 FLUOROGUIDE FOR SPINE INJECT: CPT

## 2023-02-10 RX ORDER — LIDOCAINE HYDROCHLORIDE 10 MG/ML
1 INJECTION, SOLUTION INFILTRATION; PERINEURAL ONCE AS NEEDED
Status: DISCONTINUED | OUTPATIENT
Start: 2023-02-10 | End: 2023-02-11 | Stop reason: HOSPADM

## 2023-02-10 RX ORDER — FENTANYL CITRATE 50 UG/ML
50 INJECTION, SOLUTION INTRAMUSCULAR; INTRAVENOUS AS NEEDED
Status: DISCONTINUED | OUTPATIENT
Start: 2023-02-10 | End: 2023-02-11 | Stop reason: HOSPADM

## 2023-02-10 RX ORDER — MIDAZOLAM HYDROCHLORIDE 1 MG/ML
1 INJECTION INTRAMUSCULAR; INTRAVENOUS AS NEEDED
Status: DISCONTINUED | OUTPATIENT
Start: 2023-02-10 | End: 2023-02-11 | Stop reason: HOSPADM

## 2023-02-10 RX ORDER — SODIUM CHLORIDE 0.9 % (FLUSH) 0.9 %
1-10 SYRINGE (ML) INJECTION AS NEEDED
Status: DISCONTINUED | OUTPATIENT
Start: 2023-02-10 | End: 2023-02-11 | Stop reason: HOSPADM

## 2023-02-10 RX ORDER — METHYLPREDNISOLONE ACETATE 80 MG/ML
80 INJECTION, SUSPENSION INTRA-ARTICULAR; INTRALESIONAL; INTRAMUSCULAR; SOFT TISSUE ONCE
Status: COMPLETED | OUTPATIENT
Start: 2023-02-10 | End: 2023-02-10

## 2023-02-10 RX ADMIN — IOPAMIDOL 10 ML: 408 INJECTION, SOLUTION INTRATHECAL at 09:44

## 2023-02-10 RX ADMIN — METHYLPREDNISOLONE ACETATE 80 MG: 80 INJECTION, SUSPENSION INTRA-ARTICULAR; INTRALESIONAL; INTRAMUSCULAR; SOFT TISSUE at 09:44

## 2023-02-10 NOTE — ANESTHESIA PROCEDURE NOTES
PAIN Epidural block    Pre-sedation assessment completed: 2/10/2023 9:36 AM    Patient reassessed immediately prior to procedure    Patient location during procedure: pain clinic  Start Time: 2/10/2023 9:36 AM  Stop Time: 2/10/2023 9:49 AM  Indication:procedure for pain  Performed By  Anesthesiologist: Serina Chaidez MD  Preanesthetic Checklist  Completed: patient identified, IV checked, site marked, risks and benefits discussed, surgical consent, monitors and equipment checked, pre-op evaluation and timeout performed  Additional Notes  Fluoro used.    Dx: cervical radiculopathy.   Prep:  Pt Position:prone  Sterile Tech:cap, gloves, mask and sterile barrier  Prep:chlorhexidine gluconate and isopropyl alcohol  Monitoring:blood pressure monitoring, continuous pulse oximetry and EKG  Procedure:Sedation: no     Approach:midline  Guidance: fluoroscopy  Location:cervical  Level:C7-T1  Needle Type:Tuohy  Needle Gauge:20  Aspiration:negative  Medications:  Preservative Free Saline:3mL  Isovue:2mL  Comments:Spread c/w epidurogramDepomedrol:80  Post Assessment:  Dressing:occlusive dressing applied  Pt Tolerance:patient tolerated the procedure well with no apparent complications  Complications:no

## 2023-02-10 NOTE — DISCHARGE INSTRUCTIONS
"Guide To Relieving And Avoiding Neck Pain    Exercise is important to help prevent and treat neck pain.  Good posture, exercise and avoiding injury will help to keep your neck healthy.    When the neck is strained or over worked symptoms may include headache, upper back pain, shoulder pain or arm pain.  Numbness or tingling in the fingers, dizziness or nausea may also occur.    Posture:    Avoid slumping over a desk.  Raise your work (including computer) to eye level to avoid bending at the neck.      Change Position Often:  Changing position prevents overuse of particular muscles.    Sleep On One Pillow:  Using to many pillows or to large of a pillow causes a \"kink\" in you neck.      Move and Exercise:  Living an active lifestyle is an important part of staying healthy.  Be sure to include the exercise to follow specifically for your neck.                    Range of Motion Exercises:  Do these exercises three times a day.  If you experience increased pain stop and contact your physician.  All exercises can be performed sitting or standing.        1.    2.   3.    1.  Place both hands behind you neck.  Gently tilt your neck backward so that you are looking at the ceiling.  Hold for a count of 10.    2.  Look straight facing forward.  Slowly tip your ear toward your right shoulder.  Do not force the motion.  Hold for a count of 10.  Bring head back to starting position and repeat to left side.    3.  Look straight facing forward.  Gently turn your head to the right.  Do not force the motion.  Hold for a count of 10.  Bring head back to starting position and repeat to the left side.    Exercises To Strengthen Muscles:  1.  Look straight facing forward.  Relax your shoulders.  Raise both shoulders toward your ears.  Hold for 3 seconds.       1.       2.   3.      1.  Look straight facing forward.  Relax your shoulders.  Raise both shoulders toward     2.  Raise your ars to your side and bend your elbows.  Squeeze " shoulder blades together as you rotate your arms outward.  Hold for 5 seconds.    3.  Look straight facing forward.  Pull your head straight back.  Do not tip or move your jaw.  Hold for 5 seconds.     EPIDURAL STEROID INJECTION          An epidural steroid injection is a shot of steroid medicine and numbing medicine that is given into the space between the spinal cord and the bones of the back (epidural space).  The injection helps relieve pain by an irritated or swollen nerve root.    TELL YOUR HEALTH CARE PROVIDER ABOUT:  Any allergies you have  All medicines you are taking including any over the counter medicines  Any blood disorders you have  Any surgeries you have had  Any medical conditions you have  Whether you are pregnant or may be pregnant    WHAT ARE THE RISK?  Generally, this is a safe procedure. However,problems may occur, including  Headache  Bleeding  Infection  Allergic Reaction  Nerve Damage    WHAT CAN I EXPECT AFTER THE PROCEDURE?    INJECTION SITE  Remove the Band-Aid/s after 24 hours  Check your injection site every day for signs of infection.  Check for:             Redness             Bleeding (small amt is normal)             Warmth             Pus or bad odor  Some numbness may be experienced for several hours following the procedure.  Avoid using heat on the injection site for 24 hours. You may use ice intermittently if needed by placing a         towel between your skin and the ice bag and using the ice for 20 minutes 2-3 times a day.  Do not take baths, swim or use a hot tub for 24 hours.    ACTIVITY  No strenuous activity for 24 hours then return to normal activity as tolerated.  If your leg is numb, no driving until full sensation and strength has returned.    GENERAL INSTRUCTIONS:  The injection site may feel numb, use ice with caution if numbness is present and no heat for 24 hours or until numbness is gone.   If you have numbness or weakness in your arm or leg, use those areas with  caution until normal sensation returns.  It is not uncommon to notice an increase in discomfort or a change in the location of discomfort for 3-4 days after the procedure.  If discomfort is noticed at the injection site, ice may be            applied to that area for 20 min 2-3 times a day.  Take the pain medicine your physician has prescribed or over the counter pain relievers as long as you do not have any contraindications.  If you are a diabetic, monitor your blood sugar closely.  The steroids used in your procedure may increase your blood sugar level up to 36 hours after the injection.  If your blood sugar is greater than 250, call the physician that helps you monitor your blood sugar.  Keep all follow-up visits as scheduled by your health care provider. This is important.    CONTACT OUR OFFICE IF:  You have any of these signs of infection            -Redness, swelling, or warmth around your injection site.            -Fluid or blood coming from your injection site (small amt of blood is normal)            -Pus or a bad odor from your injection site            -A fever  You develop a severe headache or a stiff neck  You lose control of your bladder or bowel movements      PAIN MANAGEMENT CENTER HOURS   Monday-Friday 7:30 am. - 4:00 pm.  For any problem related to your procedure we can be reached at [phone].  If you experience an emergency with your procedure, call [phone] or go to the emergency room.

## 2023-02-10 NOTE — H&P
[facility]    History and Physical    Patient Name: Chely Marie  :  1950  MRN:  [id number]  Date of Admission: 2/10/2023    Subjective     Patient is a 72 y.o. female presents with chief complaint of chronic, moderate neck and shoulder: bilateral pain.  Onset of symptoms was gradual starting several years ago.  Symptoms are associated/aggravated by nothing in particular or activity. Symptoms improve with injection - more than 50% relief w/ prior dalton.  Presents for dalton today.        IMPRESSION:  The patient is status post fusion from C5 to C6. There is  mild flattening of the ventral surface of the thecal sac at C4-5 and to  a lesser extent C3-4 secondary to broad-based disc osteophyte complexes  and mild grade 1 retrolisthesis at each level. This is unchanged as  compared to 2022. There is no evidence of disc herniation, cord  signal abnormality or of abnormal enhancement. See above.     This report was finalized on 1/10/2023 2:56 PM by Dr. Noah Rapp M.D.    The following portions of the patients history were reviewed and updated as appropriate: current medications, allergies, past medical history, past surgical history, past family history, past social history and problem list                Objective     Past Medical History:   Past Medical History:   Diagnosis Date   • Allergies    • Ankle fracture     LEFT   • Clostridioides difficile infection    • DDD (degenerative disc disease), lumbar    • Degeneration of cervical intervertebral disc    • Neck pain    • Osteoporosis    • PONV (postoperative nausea and vomiting)    • Varicose vein      Past Surgical History:   Past Surgical History:   Procedure Laterality Date   • ANKLE OPEN REDUCTION INTERNAL FIXATION Left 10/29/2018    Procedure: LT ANKLE OPEN REDUCTION INTERNAL FIXATION;  Surgeon: Enmanuel Camarena MD;  [place] [facility] [place] [facility] [place]  Service: [place]opedics   • ANTERIOR CERVICAL FUSION Right     C5-6; Dr. Ward   •  BLADDER SURGERY     • COLONOSCOPY N/A 10/07/2020    Procedure: COLONOSCOPY INTO CECUM WITH COLD BX POLYPECTOMY;  Surgeon: Da Serrano MD;  Location: [facility] ENDOSCOPY;  Service: Gastroenterology;  Laterality: N/A;  PRE:  SCREENING  POST:  POLYP   • EPIDURAL BLOCK     • HYSTERECTOMY     • LUMBAR DISCECTOMY     • TONSILLECTOMY     • VEIN SURGERY       Family History:   Family History   Problem Relation Age of Onset   • Alzheimer's disease Other    • No Known Problems Mother    • No Known Problems Father    • Malig Hyperthermia Neg Hx      Social History:   Social History     Socioeconomic History   • Marital status:    Tobacco Use   • Smoking status: Never   • Smokeless tobacco: Never   Substance and Sexual Activity   • Alcohol use: Yes     Comment: Social   • Drug use: No   • Sexual activity: Defer       Vital Signs Range for the last 24 hours  Temperature: Temp:  [36.5 °C (97.7 °F)] 36.5 °C (97.7 °F)   Temp Source: Temp src: Infrared   BP: BP: (115)/(63) 115/63   Pulse: Heart Rate:  [74] 74   Respirations: Resp:  [16] 16   SPO2: SpO2:  [97 %] 97 %   O2 Amount (l/min):     O2 Devices Device (Oxygen Therapy): room air   Weight:           --------------------------------------------------------------------------------    Current Outpatient Medications   Medication Sig Dispense Refill   • cetirizine (zyrTEC) 10 MG tablet Take 10 mg by mouth Daily.     • Cholecalciferol (Vitamin D3) 50 MCG (2000 UT) tablet Take  by mouth Daily.     • cyclobenzaprine (FLEXERIL) 10 MG tablet Take 1 tablet by mouth 3 (Three) Times a Day As Needed for Muscle Spasms. Can take 1/2 tablet, if the whole tablet makes too groggy 20 tablet 0   • fluticasone (FLONASE) 50 MCG/ACT nasal spray 1 spray into the nostril(s) as directed by provider Daily.     • HYDROcodone-acetaminophen (NORCO) 5-325 MG per tablet Take 1 tablet by mouth See Admin Instructions. Take 1 tablet by mouth every 4-6 hours as needed for pain     • metaxalone (SKELAXIN)  800 MG tablet Take 1 tablet by mouth every 8 (eight) hours as needed.     • Multiple Vitamins-Minerals (CENTRUM SILVER ADULT 50+) tablet Take 1 tablet by mouth daily.     • Probiotic Product (PROBIOTIC DAILY PO) Take  by mouth Daily.     • vitamin B-12 (CYANOCOBALAMIN) 1000 MCG tablet Take 1,000 mcg by mouth Daily.     • cyclobenzaprine (FLEXERIL) 10 MG tablet Take 1 tablet by mouth 3 (Three) Times a Day As Needed for Muscle Spasms. 40 tablet 5   • denosumab (Prolia) 60 MG/ML solution prefilled syringe syringe Inject  under the skin into the appropriate area as directed 1 (One) Time.     • promethazine (PHENERGAN) 25 MG tablet Take 25 mg by mouth Every 6 (Six) Hours As Needed.       Current Facility-Administered Medications   Medication Dose Route Frequency Provider Last Rate Last Admin   • fentaNYL citrate (PF) (SUBLIMAZE) injection 50 mcg  50 mcg Intravenous PRN Serina Chaidez MD       • iopamidol (ISOVUE-M 200) injection 41%  12 mL Epidural Once in imaging Serina Chaidez MD       • lidocaine (XYLOCAINE) 1 % injection 1 mL  1 mL Intradermal Once PRN Serina Chaidez MD       • methylPREDNISolone acetate (DEPO-medrol) injection 80 mg  80 mg Intra-articular Once Serina Chaidez MD       • midazolam (VERSED) injection 1 mg  1 mg Intravenous PRN Serina Chaidez MD       • sodium chloride 0.9 % flush 1-10 mL  1-10 mL Intravenous PRN Serina Chaidez MD           --------------------------------------------------------------------------------  Assessment & Plan      Anesthesia Evaluation     Patient summary reviewed and Nursing notes reviewed   history of anesthetic complications: PONV  NPO Solid Status: > 8 hours  NPO Liquid Status: > 2 hours           Airway   Mallampati: II  Dental      Pulmonary - negative pulmonary ROS   Cardiovascular - negative cardio ROS  Exercise tolerance: good (4-7 METS)        Neuro/Psych- negative ROS   GI/Hepatic/Renal/Endo - negative ROS     Musculoskeletal     (+) neck pain, radiculopathy  Abdominal    Substance History - negative use     OB/GYN negative ob/gyn ROS         Other   arthritis,                 Diagnosis and Plan    Treatment Plan  ASA 2   Patient has had previous injection/procedure with 50-75% improvement.   Procedures: Cervical Epidural Steroid Injection(PETRONA), With fluoroscopy,       Anesthetic plan and risks discussed with patient.          Diagnosis     * Cervical radiculopathy [M54.12]

## 2023-02-21 ENCOUNTER — OFFICE (OUTPATIENT)
Dept: URBAN - METROPOLITAN AREA CLINIC 66 | Facility: CLINIC | Age: 73
End: 2023-02-21

## 2023-02-21 VITALS
DIASTOLIC BLOOD PRESSURE: 63 MMHG | SYSTOLIC BLOOD PRESSURE: 94 MMHG | HEIGHT: 66 IN | HEART RATE: 82 BPM | WEIGHT: 125 LBS

## 2023-02-21 DIAGNOSIS — R19.7 DIARRHEA, UNSPECIFIED: ICD-10-CM

## 2023-02-21 PROCEDURE — 99213 OFFICE O/P EST LOW 20 MIN: CPT | Performed by: NURSE PRACTITIONER

## 2023-02-21 RX ORDER — CHOLESTYRAMINE 4 G/9G
POWDER, FOR SUSPENSION ORAL
Qty: 0 | Refills: 0 | Status: COMPLETED
End: 2023-02-21

## 2023-02-21 RX ORDER — COLESEVELAM HYDROCHLORIDE 625 MG/1
TABLET, FILM COATED ORAL
Qty: 60 | Refills: 11 | Status: ACTIVE
Start: 2021-02-17

## 2023-03-13 ENCOUNTER — OFFICE VISIT (OUTPATIENT)
Dept: NEUROSURGERY | Facility: CLINIC | Age: 73
End: 2023-03-13
Payer: MEDICARE

## 2023-03-13 VITALS
HEART RATE: 80 BPM | DIASTOLIC BLOOD PRESSURE: 80 MMHG | SYSTOLIC BLOOD PRESSURE: 122 MMHG | TEMPERATURE: 96.9 F | OXYGEN SATURATION: 98 %

## 2023-03-13 DIAGNOSIS — Z98.1 HISTORY OF FUSION OF CERVICAL SPINE: Primary | ICD-10-CM

## 2023-03-13 DIAGNOSIS — M50.30 DEGENERATION OF CERVICAL INTERVERTEBRAL DISC: ICD-10-CM

## 2023-03-13 DIAGNOSIS — G89.29 CHRONIC NECK PAIN: ICD-10-CM

## 2023-03-13 DIAGNOSIS — M54.2 CHRONIC NECK PAIN: ICD-10-CM

## 2023-03-13 PROCEDURE — 99214 OFFICE O/P EST MOD 30 MIN: CPT | Performed by: NEUROLOGICAL SURGERY

## 2023-03-30 ENCOUNTER — TELEPHONE (OUTPATIENT)
Dept: NEUROSURGERY | Facility: CLINIC | Age: 73
End: 2023-03-30
Payer: MEDICARE

## 2023-03-30 NOTE — TELEPHONE ENCOUNTER
PATIENT'S  CALLED BACK AND IS REQUESTING TO  PLEASE TEXT HIM THROUGH [employer] -[phone]     HE IS ALSO OK WITH A VM ON HIS PHONE WITH AN ANSWER.   IS ON VACATION AT [facility] [employer] [facility].    THANK YOU!

## 2023-03-30 NOTE — TELEPHONE ENCOUNTER
called, does not have verbal release. Can not talk with or help.  Please call [phone] for Shiv .  Has questions concerning message he left?? Thank you

## 2023-03-30 NOTE — TELEPHONE ENCOUNTER
I called and spoke with Shiv, He is on verbal release from 12-06-22. He is wanting to know what kind of arthritis patient has. She is scheduled for a consultation with pain management on 04-06-23. Patients  states he has been to the [facility] and he was told that there is a surgery that could be done for the arthritis.

## 2023-04-03 ENCOUNTER — TELEHEALTH PROVIDED IN PATIENT'S HOME (OUTPATIENT)
Dept: URBAN - METROPOLITAN AREA TELEHEALTH 5 | Facility: TELEHEALTH | Age: 73
End: 2023-04-03

## 2023-04-03 VITALS — HEIGHT: 66 IN

## 2023-04-03 DIAGNOSIS — A04.72 ENTEROCOLITIS DUE TO CLOSTRIDIUM DIFFICILE, NOT SPECIFIED AS: ICD-10-CM

## 2023-04-03 DIAGNOSIS — R19.7 DIARRHEA, UNSPECIFIED: ICD-10-CM

## 2023-04-03 DIAGNOSIS — R15.2 FECAL URGENCY: ICD-10-CM

## 2023-04-03 PROCEDURE — 99442: CPT | Mod: 95 | Performed by: NURSE PRACTITIONER

## 2023-04-03 RX ORDER — COLESEVELAM HYDROCHLORIDE 625 MG/1
TABLET, FILM COATED ORAL
Qty: 90 | Refills: 11 | Status: ACTIVE
Start: 2023-04-03

## 2023-04-06 ENCOUNTER — OFFICE VISIT (OUTPATIENT)
Dept: PAIN MEDICINE | Facility: CLINIC | Age: 73
End: 2023-04-06
Payer: MEDICARE

## 2023-04-06 VITALS
HEART RATE: 81 BPM | OXYGEN SATURATION: 96 % | WEIGHT: 125.6 LBS | DIASTOLIC BLOOD PRESSURE: 78 MMHG | HEIGHT: 66 IN | BODY MASS INDEX: 20.18 KG/M2 | TEMPERATURE: 96.8 F | SYSTOLIC BLOOD PRESSURE: 125 MMHG | RESPIRATION RATE: 18 BRPM

## 2023-04-06 DIAGNOSIS — M47.812 ARTHROPATHY OF CERVICAL FACET JOINT: ICD-10-CM

## 2023-04-06 DIAGNOSIS — M50.30 DEGENERATION OF CERVICAL INTERVERTEBRAL DISC: Primary | ICD-10-CM

## 2023-04-06 PROCEDURE — 1160F RVW MEDS BY RX/DR IN RCRD: CPT | Performed by: NURSE PRACTITIONER

## 2023-04-06 PROCEDURE — 1159F MED LIST DOCD IN RCRD: CPT | Performed by: NURSE PRACTITIONER

## 2023-04-06 PROCEDURE — 99214 OFFICE O/P EST MOD 30 MIN: CPT | Performed by: NURSE PRACTITIONER

## 2023-04-06 PROCEDURE — 1126F AMNT PAIN NOTED NONE PRSNT: CPT | Performed by: NURSE PRACTITIONER

## 2023-04-06 RX ORDER — COLESEVELAM 180 1/1
1 TABLET ORAL EVERY 12 HOURS SCHEDULED
COMMUNITY
Start: 2023-03-06

## 2023-04-06 RX ORDER — FLUOXETINE HYDROCHLORIDE 20 MG/1
CAPSULE ORAL
COMMUNITY
Start: 2023-03-07

## 2023-04-06 RX ORDER — MEMANTINE HYDROCHLORIDE 10 MG/1
1 TABLET ORAL EVERY 12 HOURS SCHEDULED
COMMUNITY
Start: 2023-01-09

## 2023-04-06 RX ORDER — DONEPEZIL HYDROCHLORIDE 10 MG/1
10 TABLET, FILM COATED ORAL NIGHTLY
COMMUNITY
Start: 2023-02-03

## 2023-04-06 NOTE — PATIENT INSTRUCTIONS
"-------  Education about Medial Branch Blockade and RF Therapy:    This medial branch blockade (MBB) suggested is intended for diagnostic purposes, with the intent of offering the patient Radiofrequency thermal rhizotomy (RF) if the MBB is diagnostically effective.  The diagnostic blockade is necessary to determine the likelihood that RF therapy could be efficacious in providing long term relief to the patient.    Medial branches are sensory nerve branches that connect to a facet joint and transmit sensations & pain signals from that joint.  Facet is a term for the type of joints found in the spine.  Medial branches are the nerves that go to a facet, and therefore are also sometimes called \"facet joint nerves\" (FJNs).      In a medial branch blockade procedure, xray fluoroscopy is used to verify the locations of the outside of the joint lines which are being targeted.  Under xray guidance, needles are placed to these areas.  Contrast dye is injected to confirm proper placement, with dye flowing over the joint area, and to ensure that the dye does not flow into unintended areas such as a vein.  When this is confirmed, local anesthetic is injected to block the medial branch at that joint level.      If MBBs are diagnostically successful in blocking pain, then the patient is most likely a great candidate for Radiofrequency of those facet joint nerves.  In the RF procedure, needles are placed to the joint lines in the same fashion, and after testing, the needle tips are heated to thermally treat the nerves, blocking the nerves by in essence damaging the nerves with the heat treatment(non-pulsed).       Medically, a successful RF procedure should provide a patient with 50% pain relief or more for at least 6 months.  Clinical experience suggests that successful patients receive relief more in the range of 12 months on average.  We also discussed that a fortunate minority of patients receive therapeutic success from the " MBB, and may not require RF ablation.  If a patient receives more than 8 weeks of relief from MBB, then occasional repeat MBB for therapeutic purposes is a very reasonable alternative therapy.  This course of therapy is consistent with our LCDs according to our CMS  in the area, and therefore other insurance providers should follow accordingly.  We will monitor our patients to screen for these therapeutic responders and will offer RF therapy only when necessary.        We discussed that MBB & RF are not without risks.  Guidelines regarding anticoagulant use & neuraxial procedures will be respected.  Patients that are ill or otherwise may be at risk for sepsis will not have their spines accessed by neuraxial injections of any type.  This patient will not be offered these therapies if there is an increased risk.   We discussed that there is a risk of postprocedural pain and also a risk of worsening of clinical picture with these procedures as with any neuraxial procedure.    -------

## 2023-04-06 NOTE — H&P (VIEW-ONLY)
CHIEF COMPLAINT  Neck Pain     Subjective   Chely Marie is a 72 y.o. female.   She presents to the office for evaluation of neck pain. She was referred here by Dr. Hutchinson.     Patient complains of chronic neck pain which she has had for many years.  History of cervical fusion in 2006 with Dr. Wadr.  Pain has become progressively worse over the past year or so.  She has been getting epidural steroid injections at [facility], [place].  Initially these were helpful, however the duration of relief has been transient after more recent injections.  She is also tried physical therapy as well as chiropractic treatment without much benefit.  She was recently reevaluated by neurosurgery.  No current surgical recommendations.  She has been referred to our clinic to consider cervical MBB/RFA.    Pain today 0/10 VAS, but increases to severe debilitating levels.  The pain is located in the mid to lower cervical spine, it does not radiate into either arm.  Aggravated by use of arms, repetitive motions.  Takes muscle relaxants PRN which usually help some.  If pain does not improve with muscle relaxant she will take hydrocodone sparingly which helps but knocks her out.        History of Present Illness     PEG Assessment   What number best describes your pain on average in the past week?0  What number best describes how, during the past week, pain has interfered with your enjoyment of life?0  What number best describes how, during the past week, pain has interfered with your general activity?  0      Current Outpatient Medications:   •  cetirizine (zyrTEC) 10 MG tablet, Take 1 tablet by mouth Daily., Disp: , Rfl:   •  Cholecalciferol (Vitamin D3) 50 MCG (2000 UT) tablet, Take  by mouth Daily., Disp: , Rfl:   •  colesevelam (WELCHOL) 625 MG tablet, Take 1 tablet by mouth Every 12 (Twelve) Hours., Disp: , Rfl:   •  cyclobenzaprine (FLEXERIL) 10 MG tablet, Take 1 tablet by mouth 3 (Three) Times a Day As Needed for  Muscle Spasms., Disp: 40 tablet, Rfl: 5  •  denosumab (Prolia) 60 MG/ML solution prefilled syringe syringe, Inject  under the skin into the appropriate area as directed 1 (One) Time., Disp: , Rfl:   •  donepezil (ARICEPT) 10 MG tablet, Take 1 tablet by mouth Every Night., Disp: , Rfl:   •  FLUoxetine (PROzac) 20 MG capsule, TAKE 1 CAPSULE BY MOUTH 1 TIME EACH DAY., Disp: , Rfl:   •  fluticasone (FLONASE) 50 MCG/ACT nasal spray, 1 spray into the nostril(s) as directed by provider Daily., Disp: , Rfl:   •  HYDROcodone-acetaminophen (NORCO) 5-325 MG per tablet, Take 1 tablet by mouth See Admin Instructions. Take 1 tablet by mouth every 4-6 hours as needed for pain, Disp: , Rfl:   •  memantine (NAMENDA) 10 MG tablet, Take 1 tablet by mouth Every 12 (Twelve) Hours., Disp: , Rfl:   •  metaxalone (SKELAXIN) 800 MG tablet, Take 1 tablet by mouth Every 8 (Eight) Hours As Needed., Disp: , Rfl:   •  Multiple Vitamins-Minerals (CENTRUM SILVER ADULT 50+) tablet, Take 1 tablet by mouth Daily., Disp: , Rfl:   •  Probiotic Product (PROBIOTIC DAILY PO), Take  by mouth Daily., Disp: , Rfl:   •  promethazine (PHENERGAN) 25 MG tablet, Take 1 tablet by mouth Every 6 (Six) Hours As Needed., Disp: , Rfl:   •  vitamin B-12 (CYANOCOBALAMIN) 1000 MCG tablet, Take 1 tablet by mouth Daily., Disp: , Rfl:     The following portions of the patient's history were reviewed and updated as appropriate: allergies, current medications, past family history, past medical history, past social history, past surgical history and problem list.      REVIEW OF PERTINENT MEDICAL DATA        Patient has been referred to our clinic by Dr. Patel Hutchinson.  I reviewed his office note 3/13/2023.  History of cervical fusion in 2006 with Dr. Ebenezer Zarco.  She has had several cervical epidural blocks which in the beginning helped her but benefit waned over time.  She does not have much radicular pain.  Nothing surgical to recommend.  Recommending cervical  "MBB/possible RFA.    Review of Systems   Gastrointestinal: Negative for constipation and diarrhea.   Genitourinary: Negative for difficulty urinating.   Musculoskeletal: Negative for back pain.   Neurological: Negative for weakness and numbness.   Psychiatric/Behavioral: Negative for sleep disturbance and suicidal ideas. The patient is not nervous/anxious.      Vitals:    04/06/23 1004   BP: 125/78   Pulse: 81   Resp: 18   Temp: 96.8 °F (36 °C)   SpO2: 96%   Weight: 57 kg (125 lb 9.6 oz)   Height: 167.6 cm (66\")     Objective   Physical Exam  Vitals and nursing note reviewed.   Constitutional:       General: She is not in acute distress.     Appearance: Normal appearance. She is not ill-appearing.   Pulmonary:      Effort: Pulmonary effort is normal. No respiratory distress.   Musculoskeletal:      Cervical back: Tenderness and bony tenderness present.      Comments: +cervical facet tenderness/loading    Neurological:      Mental Status: She is alert and oriented to person, place, and time.      Motor: Motor function is intact. No weakness.      Gait: Gait normal.   Psychiatric:         Mood and Affect: Mood normal.         Behavior: Behavior normal.         Assessment & Plan   Diagnoses and all orders for this visit:    1. Degeneration of cervical intervertebral disc (Primary)    2. Arthropathy of cervical facet joint      --- Bilateral C4-C6 MBB x 2, 2-3 weeks apart    Reviewed the procedure at length with the patient.  Included in the review was expectations, complications, risk and benefits.The procedure was described in detail and the risks, benefits and alternatives were discussed with the patient (including but not limited to: bleeding, infection, nerve damage, worsening of pain, inability to perform injection, paralysis, seizures, coma, no pain relief and death) who agreed to proceed.  Discussed the potential for sedation if warranted/wanted.  The procedure will plan to be performed at [facility] with fluoroscopic guidance(unless ultrasound is indicated) and could potentially have steroids and contrast dye used. Questions were answered and in a way the patient could understand.  Patient verbalized understanding and wishes to proceed.  This intervention will be ordered.  Discussed with patient that all procedures are part of a multimodal plan of care and include either formal PT or a home exercise program.  Patient has no evidence of coagulopathy or current infection.    Diagnostic Facet Joint Procedure:   MBB     The first diagnostic facet joint procedure is considered medically reasonable and necessary for the diagnosis and treatment of chronic pain for this patient due to the patient meeting all of the following criteria:    - 1. Moderate to severe chronic neck or low back pain, predominantly axial, that causes functional deficit measured on pain or disability scale.  - 2. Pain present for minimum of 3 months with documented failure to respond to noninvasive conservative management (as tolerated)  - 3. Absence of untreated radiculopathy or neurogenic claudication (except for radiculopathy caused by facet joint synovial cyst)  - 4. There is no non-facet pathology per clinical assessment or radiology studies that could explain the source of the patient’s pain, including but not limited to fracture, tumor, infection, or significant deformity.    The confirmatory diagnostic facet joint procedure is considered medically reasonable and necessary for the diagnosis and treatment of chronic pain for this patient due to the patient meeting all of the following criteria:    Pain / Disability Scale    The scale used for measurement of pain and/or disability for this patient was the [place] back pain disability scale.  The score was 89 on 04/06/2023    --- Follow-up for procedure and in clinic 1 week post procedure          As the clinician, I personally reviewed the CATHY from 4/6/2023 while the patient  was in the office today.    Dictated utilizing Dragon dictation.

## 2023-04-06 NOTE — PROGRESS NOTES
CHIEF COMPLAINT  Neck Pain     Subjective   Chely Marie is a 72 y.o. female.   She presents to the office for evaluation of neck pain. She was referred here by Dr. Hutchinson.     Patient complains of chronic neck pain which she has had for many years.  History of cervical fusion in 2006 with Dr. Ward.  Pain has become progressively worse over the past year or so.  She has been getting epidural steroid injections at [facility].  Initially these were helpful, however the duration of relief has been transient after more recent injections.  She is also tried physical therapy as well as chiropractic treatment without much benefit.  She was recently reevaluated by neurosurgery.  No current surgical recommendations.  She has been referred to our clinic to consider cervical MBB/RFA.    Pain today 0/10 VAS, but increases to severe debilitating levels.  The pain is located in the mid to lower cervical spine, it does not radiate into either arm.  Aggravated by use of arms, repetitive motions.  Takes muscle relaxants PRN which usually help some.  If pain does not improve with muscle relaxant she will take hydrocodone sparingly which helps but knocks her out.        History of Present Illness     PEG Assessment   What number best describes your pain on average in the past week?0  What number best describes how, during the past week, pain has interfered with your enjoyment of life?0  What number best describes how, during the past week, pain has interfered with your general activity?  0      Current Outpatient Medications:   •  cetirizine (zyrTEC) 10 MG tablet, Take 1 tablet by mouth Daily., Disp: , Rfl:   •  Cholecalciferol (Vitamin D3) 50 MCG (2000 UT) tablet, Take  by mouth Daily., Disp: , Rfl:   •  colesevelam (WELCHOL) 625 MG tablet, Take 1 tablet by mouth Every 12 (Twelve) Hours., Disp: , Rfl:   •  cyclobenzaprine (FLEXERIL) 10 MG tablet, Take 1 tablet by mouth 3 (Three) Times a Day As Needed for  Muscle Spasms., Disp: 40 tablet, Rfl: 5  •  denosumab (Prolia) 60 MG/ML solution prefilled syringe syringe, Inject  under the skin into the appropriate area as directed 1 (One) Time., Disp: , Rfl:   •  donepezil (ARICEPT) 10 MG tablet, Take 1 tablet by mouth Every Night., Disp: , Rfl:   •  FLUoxetine (PROzac) 20 MG capsule, TAKE 1 CAPSULE BY MOUTH 1 TIME EACH DAY., Disp: , Rfl:   •  fluticasone (FLONASE) 50 MCG/ACT nasal spray, 1 spray into the nostril(s) as directed by provider Daily., Disp: , Rfl:   •  HYDROcodone-acetaminophen (NORCO) 5-325 MG per tablet, Take 1 tablet by mouth See Admin Instructions. Take 1 tablet by mouth every 4-6 hours as needed for pain, Disp: , Rfl:   •  memantine (NAMENDA) 10 MG tablet, Take 1 tablet by mouth Every 12 (Twelve) Hours., Disp: , Rfl:   •  metaxalone (SKELAXIN) 800 MG tablet, Take 1 tablet by mouth Every 8 (Eight) Hours As Needed., Disp: , Rfl:   •  Multiple Vitamins-Minerals (CENTRUM SILVER ADULT 50+) tablet, Take 1 tablet by mouth Daily., Disp: , Rfl:   •  Probiotic Product (PROBIOTIC DAILY PO), Take  by mouth Daily., Disp: , Rfl:   •  promethazine (PHENERGAN) 25 MG tablet, Take 1 tablet by mouth Every 6 (Six) Hours As Needed., Disp: , Rfl:   •  vitamin B-12 (CYANOCOBALAMIN) 1000 MCG tablet, Take 1 tablet by mouth Daily., Disp: , Rfl:     The following portions of the patient's history were reviewed and updated as appropriate: allergies, current medications, past family history, past medical history, past social history, past surgical history and problem list.      REVIEW OF PERTINENT MEDICAL DATA        Patient has been referred to our clinic by Dr. Patel Hutchinson.  I reviewed his office note 3/13/2023.  History of cervical fusion in 2006 with Dr. Ebenezer Zarco.  She has had several cervical epidural blocks which in the beginning helped her but benefit waned over time.  She does not have much radicular pain.  Nothing surgical to recommend.  Recommending cervical  "MBB/possible RFA.    Review of Systems   Gastrointestinal: Negative for constipation and diarrhea.   Genitourinary: Negative for difficulty urinating.   Musculoskeletal: Negative for back pain.   Neurological: Negative for weakness and numbness.   Psychiatric/Behavioral: Negative for sleep disturbance and suicidal ideas. The patient is not nervous/anxious.      Vitals:    04/06/23 1004   BP: 125/78   Pulse: 81   Resp: 18   Temp: 96.8 °F (36 °C)   SpO2: 96%   Weight: 57 kg (125 lb 9.6 oz)   Height: 167.6 cm (66\")     Objective   Physical Exam  Vitals and nursing note reviewed.   Constitutional:       General: She is not in acute distress.     Appearance: Normal appearance. She is not ill-appearing.   Pulmonary:      Effort: Pulmonary effort is normal. No respiratory distress.   Musculoskeletal:      Cervical back: Tenderness and bony tenderness present.      Comments: +cervical facet tenderness/loading    Neurological:      Mental Status: She is alert and oriented to person, place, and time.      Motor: Motor function is intact. No weakness.      Gait: Gait normal.   Psychiatric:         Mood and Affect: Mood normal.         Behavior: Behavior normal.         Assessment & Plan   Diagnoses and all orders for this visit:    1. Degeneration of cervical intervertebral disc (Primary)    2. Arthropathy of cervical facet joint      --- Bilateral C4-C6 MBB x 2, 2-3 weeks apart    Reviewed the procedure at length with the patient.  Included in the review was expectations, complications, risk and benefits.The procedure was described in detail and the risks, benefits and alternatives were discussed with the patient (including but not limited to: bleeding, infection, nerve damage, worsening of pain, inability to perform injection, paralysis, seizures, coma, no pain relief and death) who agreed to proceed.  Discussed the potential for sedation if warranted/wanted.  The procedure will plan to be performed at [facility] with fluoroscopic guidance(unless ultrasound is indicated) and could potentially have steroids and contrast dye used. Questions were answered and in a way the patient could understand.  Patient verbalized understanding and wishes to proceed.  This intervention will be ordered.  Discussed with patient that all procedures are part of a multimodal plan of care and include either formal PT or a home exercise program.  Patient has no evidence of coagulopathy or current infection.    Diagnostic Facet Joint Procedure:   MBB     The first diagnostic facet joint procedure is considered medically reasonable and necessary for the diagnosis and treatment of chronic pain for this patient due to the patient meeting all of the following criteria:    - 1. Moderate to severe chronic neck or low back pain, predominantly axial, that causes functional deficit measured on pain or disability scale.  - 2. Pain present for minimum of 3 months with documented failure to respond to noninvasive conservative management (as tolerated)  - 3. Absence of untreated radiculopathy or neurogenic claudication (except for radiculopathy caused by facet joint synovial cyst)  - 4. There is no non-facet pathology per clinical assessment or radiology studies that could explain the source of the patient’s pain, including but not limited to fracture, tumor, infection, or significant deformity.    The confirmatory diagnostic facet joint procedure is considered medically reasonable and necessary for the diagnosis and treatment of chronic pain for this patient due to the patient meeting all of the following criteria:    Pain / Disability Scale    The scale used for measurement of pain and/or disability for this patient was the [place] back pain disability scale.  The score was 89 on 04/06/2023    --- Follow-up for procedure and in clinic 1 week post procedure          As the clinician, I personally reviewed the CATHY from 4/6/2023 while the patient  was in the office today.    Dictated utilizing Dragon dictation.

## 2023-04-11 ENCOUNTER — TRANSCRIBE ORDERS (OUTPATIENT)
Dept: SURGERY | Facility: SURGERY CENTER | Age: 73
End: 2023-04-11
Payer: MEDICARE

## 2023-04-11 ENCOUNTER — PREP FOR SURGERY (OUTPATIENT)
Dept: SURGERY | Facility: SURGERY CENTER | Age: 73
End: 2023-04-11
Payer: MEDICARE

## 2023-04-11 DIAGNOSIS — Z41.9 SURGERY, ELECTIVE: Primary | ICD-10-CM

## 2023-04-11 DIAGNOSIS — M47.812 ARTHROPATHY OF CERVICAL FACET JOINT: Primary | ICD-10-CM

## 2023-04-12 ENCOUNTER — HOSPITAL ENCOUNTER (OUTPATIENT)
Facility: SURGERY CENTER | Age: 73
Setting detail: HOSPITAL OUTPATIENT SURGERY
Discharge: HOME | End: 2023-04-12
Attending: ANESTHESIOLOGY | Admitting: ANESTHESIOLOGY
Payer: MEDICARE

## 2023-04-12 ENCOUNTER — HOSPITAL ENCOUNTER (OUTPATIENT)
Dept: GENERAL RADIOLOGY | Facility: SURGERY CENTER | Age: 73
Setting detail: HOSPITAL OUTPATIENT SURGERY
End: 2023-04-12
Payer: MEDICARE

## 2023-04-12 VITALS
WEIGHT: 120 LBS | HEART RATE: 73 BPM | HEIGHT: 67 IN | RESPIRATION RATE: 16 BRPM | TEMPERATURE: 99.5 F | OXYGEN SATURATION: 98 % | SYSTOLIC BLOOD PRESSURE: 154 MMHG | DIASTOLIC BLOOD PRESSURE: 98 MMHG | BODY MASS INDEX: 18.83 KG/M2

## 2023-04-12 DIAGNOSIS — Z41.9 SURGERY, ELECTIVE: ICD-10-CM

## 2023-04-12 PROCEDURE — 77002 NEEDLE LOCALIZATION BY XRAY: CPT

## 2023-04-12 PROCEDURE — 25010000002 MIDAZOLAM PER 1 MG: Performed by: ANESTHESIOLOGY

## 2023-04-12 PROCEDURE — 64491 INJ PARAVERT F JNT C/T 2 LEV: CPT | Performed by: ANESTHESIOLOGY

## 2023-04-12 PROCEDURE — 76000 FLUOROSCOPY <1 HR PHYS/QHP: CPT

## 2023-04-12 PROCEDURE — 64490 INJ PARAVERT F JNT C/T 1 LEV: CPT | Performed by: ANESTHESIOLOGY

## 2023-04-12 RX ORDER — MIDAZOLAM HYDROCHLORIDE 1 MG/ML
INJECTION INTRAMUSCULAR; INTRAVENOUS AS NEEDED
Status: DISCONTINUED | OUTPATIENT
Start: 2023-04-12 | End: 2023-04-12 | Stop reason: HOSPADM

## 2023-04-12 NOTE — OP NOTE
Cervical Medial Branch Blockade at Bilateral  C4, C5 and C6  [facility]      PREOPERATIVE DIAGNOSIS:  Cervical spondylosis without myelopathy   POSTOPERATIVE DIAGNOSIS:  Same as preoperative diagnosis    PROCEDURE:    Cervical Facet Nerve (medial branch) Blocks, with Fluoroscopy:      LEVELS: bilateral  C4, C5 and C6  1. First level 84741 -LT, RT  2. Second level 76797 -LT, RT    PRE-PROCEDURE DISCUSSION WITH PATIENT:    Risks and complications were discussed with the patient prior to starting the procedure and informed consent was obtained.  We discussed various topics, including but not limited to bleeding, infection, injury, nerve injury, paralysis, coma, death, postprocedural soreness or painful flare, worsening of clinical picture, lack of pain relief.  We discussed the diagnostic nature of medial branch blockades.      SURGEON:  Aniyah Wick MD    REASON FOR PROCEDURE:    The patient complains of pain that seems to have a significant axial component    SEDATION:  Light sedation was used for this procedure which included and Versed 1mg  ANESTHETIC:   Marcaine 0.5%  STEROID:  None  TOTAL VOLUME OF SOLUTION:  6 mL      DESCRIPTON OF PROCEDURE:  After obtaining informed consent, the patient was placed in the prone position. IV access was obtained.  EKG, blood pressure, and pulse oximeter were monitored and all sedation was administered by an RN under my direct guidance.  The cervical area was prepped with Chloraprep and draped with sterile barrier.     AP fluoroscopic image was used to visualize the waists of the articular pillars on the right side at C4, C5 and C6.  A 25-gauge needle was used to localize the skin with 1% lidocaine.  A 22-gauge spinal needle was then advanced percutaneously through the skin tracts under fluoroscopic guidance in a coaxial view to contact periosteum at each of these target sites.  Lateral fluoroscopy was then used to advance the needle tips to the midpoint of  the articular pillars at each level.  All needle tips were radiographically confirmed to be posterior to the neural foramen. After negative aspiration of each needle, a volume of 1 mL of injectate was administered at each site.      The same procedure was then performed on the contralateral side in the exact same fashion.      Needles were removed intact from all levels.  Vitals were stable throughout.       ESTIMATED BLOOD LOSS:  minimal  SPECIMENS:  None    COMPLICATIONS:    No complications were noted.    PRE-PROCEDURE PAIN SCORE: 4/10 at rest, 4/10 with activity  POST-PROCEDURE PAIN SCORE 0/10    TOLERANCE & DISCHARGE CONDITION:    The patient tolerated the procedure well.  The patient was transported to the recovery area without difficulties.  The patient was discharged to home under the care of family in stable and satisfactory condition.       PLAN OF CARE:  1. The patient was given our standard instruction sheet.  2. We discussed that Cervical Medial Branch Blockade is a diagnostic procedure in consideration for radiofrequency ablation if two diagnostic procedures proved to be positive for significant benefit.  An alternative plan could be therapeutic cervical medial branch blocks on an as-needed basis.  3. The patient is asked to keep an account of their pain postoperatively today.  4. The patient will Return to clinic 1-2 wks.  5. The patient will resume all medications as per the medication reconciliation sheet.

## 2023-04-12 NOTE — DISCHARGE INSTRUCTIONS
[facility] - Post-procedure Instructions          --  While there are no absolute restrictions, it is recommended that you do not perform strenuous activity today. In the morning, you may resume your level of activity as before your block.    --  If you have a band-aid at your injection site, please remove it later today. Observe the area for any redness, swelling, pus-like drainage, or a temperature over 101°. If any of these symptoms occur, please call your doctor at [phone]. If after office hours, leave a message and the on-call provider will return your call.    --  Ice may be applied to your injection site. It is recommended you avoid direct heat (heating pad; hot tub) for 1-2 days.    --  Call [facility] at [phone] if you experience persistent headache, persistent bleeding from the injection site, or severe pain not relieved by heat or oral medication.    --  Do not make important decisions today.    --  Due to the effects of the block and/or the I.V. Sedation, DO NOT drive or operate hazardous machinery for 12 hours.  Local anesthetics may cause numbness after procedure and precautions must be taken with regards to operating equipment as well as with walking, even if ambulating with assistance of another person or with an assistive device.    --  Do not drink alcohol for 12 hours.    -- You may return to work tomorrow, or as directed by your referring doctor.    --  Occasionally you may notice a slight increase in your pain after the procedure. This should start to improve within the next 24-48 hours. Radiofrequency ablation procedure pain may last 3-4 weeks.    --  It may take as long as 3-4 days before you notice a gradual improvement in your pain and/or other symptoms.    -- You may continue to take your prescribed pain medication as needed.    --  Some normal possible side effects of steroid use could include fluid retention, increased blood sugar, dull headache,  increased sweating, increased appetite, mood swings and flushing.    --  Diabetics are recommended to watch their blood glucose level closely for 24-48 hours after the injection.    --  Must stay in PACU for 20 min upon arrival and prove no leg weakness before being discharged.    --  IN THE EVENT OF A LIFE THREATENING EMERGENCY, (CHEST PAIN, BREATHING DIFFICULTIES, PARALYSIS…) YOU SHOULD GO TO YOUR NEAREST EMERGENCY ROOM.    --  You should be contacted by our office within 2-3 days to schedule follow up or next appointment date.  If not contacted within 7 days, please call the office at [phone]    If you have even 1 hour of relief, these injections are considered successful.

## 2023-04-14 ENCOUNTER — TRANSCRIBE ORDERS (OUTPATIENT)
Dept: SURGERY | Facility: SURGERY CENTER | Age: 73
End: 2023-04-14
Payer: MEDICARE

## 2023-04-14 DIAGNOSIS — Z41.9 SURGERY, ELECTIVE: Primary | ICD-10-CM

## 2023-04-19 ENCOUNTER — PREP FOR SURGERY (OUTPATIENT)
Dept: SURGERY | Facility: SURGERY CENTER | Age: 73
End: 2023-04-19
Payer: MEDICARE

## 2023-04-19 ENCOUNTER — OFFICE VISIT (OUTPATIENT)
Dept: PAIN MEDICINE | Facility: CLINIC | Age: 73
End: 2023-04-19
Payer: MEDICARE

## 2023-04-19 VITALS
DIASTOLIC BLOOD PRESSURE: 76 MMHG | BODY MASS INDEX: 19.18 KG/M2 | OXYGEN SATURATION: 97 % | HEART RATE: 97 BPM | HEIGHT: 67 IN | WEIGHT: 122.2 LBS | TEMPERATURE: 98.2 F | SYSTOLIC BLOOD PRESSURE: 112 MMHG

## 2023-04-19 DIAGNOSIS — M47.812 ARTHROPATHY OF CERVICAL FACET JOINT: ICD-10-CM

## 2023-04-19 DIAGNOSIS — M47.812 ARTHROPATHY OF CERVICAL FACET JOINT: Primary | ICD-10-CM

## 2023-04-19 DIAGNOSIS — M50.30 DEGENERATION OF CERVICAL INTERVERTEBRAL DISC: Primary | ICD-10-CM

## 2023-04-19 PROCEDURE — 1160F RVW MEDS BY RX/DR IN RCRD: CPT | Performed by: NURSE PRACTITIONER

## 2023-04-19 PROCEDURE — 1159F MED LIST DOCD IN RCRD: CPT | Performed by: NURSE PRACTITIONER

## 2023-04-19 PROCEDURE — 99214 OFFICE O/P EST MOD 30 MIN: CPT | Performed by: NURSE PRACTITIONER

## 2023-04-19 PROCEDURE — 1126F AMNT PAIN NOTED NONE PRSNT: CPT | Performed by: NURSE PRACTITIONER

## 2023-04-19 RX ORDER — SODIUM CHLORIDE 0.9 % (FLUSH) 0.9 %
10 SYRINGE (ML) INJECTION AS NEEDED
OUTPATIENT
Start: 2023-04-19

## 2023-04-19 RX ORDER — SODIUM CHLORIDE 0.9 % (FLUSH) 0.9 %
10 SYRINGE (ML) INJECTION EVERY 12 HOURS SCHEDULED
Status: CANCELLED | OUTPATIENT
Start: 2023-04-19

## 2023-04-19 RX ORDER — SODIUM CHLORIDE 0.9 % (FLUSH) 0.9 %
10 SYRINGE (ML) INJECTION EVERY 12 HOURS SCHEDULED
OUTPATIENT
Start: 2023-04-19

## 2023-04-19 RX ORDER — SODIUM CHLORIDE 0.9 % (FLUSH) 0.9 %
10 SYRINGE (ML) INJECTION AS NEEDED
Status: CANCELLED | OUTPATIENT
Start: 2023-04-19

## 2023-04-19 NOTE — PROGRESS NOTES
CHIEF COMPLAINT  Procedure follow up - CERVICAL/THORACIC MEDIAL BRANCH BLOCK bilateral C4-C6     Subjective   Chely Marie is a 72 y.o. female  who presents to the office for follow-up of procedure.  She completed a CERVICAL/THORACIC MEDIAL BRANCH BLOCK bilateral C4-C6    on  4/12/2023 performed by Dr. Wick for management of neck pain. Patient reports 100% relief from the procedure for duration of local anesthetic.  In fact she worked in the garden for several hours with minimal discomfort, when typically this type of activity would lead to her needing muscle relaxants or pain medication.      Pertinent surgical history ---  History of cervical fusion in 2006 with Dr. Ward    Interventional pain management ---   several epidural steroid injections at [facility] --- Initially these were helpful    Other conservative treatment ---   physical therapy as well as chiropractic treatment without much benefit    Medications ---  Norco, Skelaxin, Flexeril - sparingly due to drowsiness (PCP)    History of Present Illness     PEG Assessment   What number best describes your pain on average in the past week?7  What number best describes how, during the past week, pain has interfered with your enjoyment of life?10  What number best describes how, during the past week, pain has interfered with your general activity?  10    Review of Pertinent Medical Data ---      The following portions of the patient's history were reviewed and updated as appropriate: allergies, current medications, past family history, past medical history, past social history, past surgical history and problem list.    Review of Systems   Constitutional: Negative for chills, fatigue and fever.   Respiratory: Negative for cough and shortness of breath.    Gastrointestinal: Negative for abdominal pain, constipation and diarrhea.   Genitourinary: Negative for difficulty urinating and dysuria.   Musculoskeletal: Positive for neck pain.  "  Neurological: Negative for dizziness, weakness, numbness and headaches.   Psychiatric/Behavioral: Positive for sleep disturbance (pain).     I have reviewed and confirmed the accuracy of the ROS as documented by the MA/LPN/RN SUSHMA Sellers     Vitals:    04/19/23 1053   BP: 112/76   BP Location: Left arm   Patient Position: Sitting   Pulse: 97   Temp: 98.2 °F (36.8 °C)   TempSrc: Temporal   SpO2: 97%   Weight: 55.4 kg (122 lb 3.2 oz)   Height: 170.2 cm (67\")   PainSc: 0-No pain   PainLoc: Neck         Objective   Physical Exam  Vitals and nursing note reviewed.   Constitutional:       General: She is not in acute distress.     Appearance: Normal appearance. She is not ill-appearing.   Pulmonary:      Effort: Pulmonary effort is normal. No respiratory distress.   Musculoskeletal:      Cervical back: Tenderness and bony tenderness present. Pain with movement present.      Comments: +cervical facet tenderness/loading    Neurological:      Mental Status: She is alert and oriented to person, place, and time.      Motor: Motor function is intact. No weakness.      Gait: Gait normal.   Psychiatric:         Mood and Affect: Mood normal.         Behavior: Behavior normal.       Assessment & Plan   Diagnoses and all orders for this visit:    1. Degeneration of cervical intervertebral disc (Primary)    2. Arthropathy of cervical facet joint        --- Proceed with bilateral C4-C6 MBB #2    Diagnostic Facet Joint Procedure:   MBB     The confirmatory diagnostic facet joint procedure is considered medically reasonable and necessary for the diagnosis and treatment of chronic pain for this patient due to the patient meeting all of the following criteria:    - 1. Moderate to severe chronic neck or low back pain, predominantly axial, that causes functional deficit measured on pain or disability scale.  - 2. Pain present for minimum of 3 months with documented failure to respond to noninvasive conservative management (as " tolerated)  - 3. Absence of untreated radiculopathy or neurogenic claudication (except for radiculopathy caused by facet joint synovial cyst)  - 4. There is no non-facet pathology per clinical assessment or radiology studies that could explain the source of the patient’s pain, including but not limited to fracture, tumor, infection, or significant deformity.    The patient has also shown a consistent positive response of at least 80% relief of primary (index) pain (with the duration of relief being consistent with the agent used).    Pain / Disability Scale     The scale used for measurement of pain and/or disability for this patient was the [place] back pain disability scale.  The score was 89 on 04/06/2023    --- Follow-up for MBB # 2 and in clinic 1 week post procedure          As the clinician, I personally reviewed the CATHY from 4/19/2023 while the patient was in the office today.    Dictated utilizing Dragon dictation.

## 2023-04-19 NOTE — H&P (VIEW-ONLY)
CHIEF COMPLAINT  Procedure follow up - CERVICAL/THORACIC MEDIAL BRANCH BLOCK bilateral C4-C6     Subjective   Chely Marie is a 72 y.o. female  who presents to the office for follow-up of procedure.  She completed a CERVICAL/THORACIC MEDIAL BRANCH BLOCK bilateral C4-C6    on  4/12/2023 performed by Dr. Wick for management of neck pain. Patient reports 100% relief from the procedure for duration of local anesthetic.  In fact she worked in the garden for several hours with minimal discomfort, when typically this type of activity would lead to her needing muscle relaxants or pain medication.      Pertinent surgical history ---  History of cervical fusion in 2006 with Dr. Ward    Interventional pain management ---   several epidural steroid injections at [facility] --- Initially these were helpful    Other conservative treatment ---   physical therapy as well as chiropractic treatment without much benefit    Medications ---  Norco, Skelaxin, Flexeril - sparingly due to drowsiness (PCP)    History of Present Illness     PEG Assessment   What number best describes your pain on average in the past week?7  What number best describes how, during the past week, pain has interfered with your enjoyment of life?10  What number best describes how, during the past week, pain has interfered with your general activity?  10    Review of Pertinent Medical Data ---      The following portions of the patient's history were reviewed and updated as appropriate: allergies, current medications, past family history, past medical history, past social history, past surgical history and problem list.    Review of Systems   Constitutional: Negative for chills, fatigue and fever.   Respiratory: Negative for cough and shortness of breath.    Gastrointestinal: Negative for abdominal pain, constipation and diarrhea.   Genitourinary: Negative for difficulty urinating and dysuria.   Musculoskeletal: Positive for neck pain.  "  Neurological: Negative for dizziness, weakness, numbness and headaches.   Psychiatric/Behavioral: Positive for sleep disturbance (pain).     I have reviewed and confirmed the accuracy of the ROS as documented by the MA/LPN/RN SUSHMA Sellers     Vitals:    04/19/23 1053   BP: 112/76   BP Location: Left arm   Patient Position: Sitting   Pulse: 97   Temp: 98.2 °F (36.8 °C)   TempSrc: Temporal   SpO2: 97%   Weight: 55.4 kg (122 lb 3.2 oz)   Height: 170.2 cm (67\")   PainSc: 0-No pain   PainLoc: Neck         Objective   Physical Exam  Vitals and nursing note reviewed.   Constitutional:       General: She is not in acute distress.     Appearance: Normal appearance. She is not ill-appearing.   Pulmonary:      Effort: Pulmonary effort is normal. No respiratory distress.   Musculoskeletal:      Cervical back: Tenderness and bony tenderness present. Pain with movement present.      Comments: +cervical facet tenderness/loading    Neurological:      Mental Status: She is alert and oriented to person, place, and time.      Motor: Motor function is intact. No weakness.      Gait: Gait normal.   Psychiatric:         Mood and Affect: Mood normal.         Behavior: Behavior normal.       Assessment & Plan   Diagnoses and all orders for this visit:    1. Degeneration of cervical intervertebral disc (Primary)    2. Arthropathy of cervical facet joint        --- Proceed with bilateral C4-C6 MBB #2    Diagnostic Facet Joint Procedure:   MBB     The confirmatory diagnostic facet joint procedure is considered medically reasonable and necessary for the diagnosis and treatment of chronic pain for this patient due to the patient meeting all of the following criteria:    - 1. Moderate to severe chronic neck or low back pain, predominantly axial, that causes functional deficit measured on pain or disability scale.  - 2. Pain present for minimum of 3 months with documented failure to respond to noninvasive conservative management (as " tolerated)  - 3. Absence of untreated radiculopathy or neurogenic claudication (except for radiculopathy caused by facet joint synovial cyst)  - 4. There is no non-facet pathology per clinical assessment or radiology studies that could explain the source of the patient’s pain, including but not limited to fracture, tumor, infection, or significant deformity.    The patient has also shown a consistent positive response of at least 80% relief of primary (index) pain (with the duration of relief being consistent with the agent used).    Pain / Disability Scale     The scale used for measurement of pain and/or disability for this patient was the [place] back pain disability scale.  The score was 89 on 04/06/2023    --- Follow-up for MBB # 2 and in clinic 1 week post procedure          As the clinician, I personally reviewed the CATHY from 4/19/2023 while the patient was in the office today.    Dictated utilizing Dragon dictation.

## 2023-04-21 NOTE — DISCHARGE INSTRUCTIONS
[facility] - Post-procedure Instructions          --  While there are no absolute restrictions, it is recommended that you do not perform strenuous activity today. In the morning, you may resume your level of activity as before your block.    --  If you have a band-aid at your injection site, please remove it later today. Observe the area for any redness, swelling, pus-like drainage, or a temperature over 101°. If any of these symptoms occur, please call your doctor at [phone]. If after office hours, leave a message and the on-call provider will return your call.    --  Ice may be applied to your injection site. It is recommended you avoid direct heat (heating pad; hot tub) for 1-2 days.    --  Call [facility] at [phone] if you experience persistent headache, persistent bleeding from the injection site, or severe pain not relieved by heat or oral medication.    --  Do not make important decisions today.    --  Due to the effects of the block and/or the I.V. Sedation, DO NOT drive or operate hazardous machinery for 12 hours.  Local anesthetics may cause numbness after procedure and precautions must be taken with regards to operating equipment as well as with walking, even if ambulating with assistance of another person or with an assistive device.    --  Do not drink alcohol for 12 hours.    -- You may return to work tomorrow, or as directed by your referring doctor.    --  Occasionally you may notice a slight increase in your pain after the procedure. This should start to improve within the next 24-48 hours. Radiofrequency ablation procedure pain may last 3-4 weeks.    --  It may take as long as 3-4 days before you notice a gradual improvement in your pain and/or other symptoms.    -- You may continue to take your prescribed pain medication as needed.    --  Some normal possible side effects of steroid use could include fluid retention, increased blood sugar, dull headache,  increased sweating, increased appetite, mood swings and flushing.    --  Diabetics are recommended to watch their blood glucose level closely for 24-48 hours after the injection.    --  Must stay in PACU for 20 min upon arrival and prove no leg weakness before being discharged.    --  IN THE EVENT OF A LIFE THREATENING EMERGENCY, (CHEST PAIN, BREATHING DIFFICULTIES, PARALYSIS…) YOU SHOULD GO TO YOUR NEAREST EMERGENCY ROOM.    --  You should be contacted by our office within 2-3 days to schedule follow up or next appointment date.  If not contacted within 7 days, please call the office at [phone]    If you have even 1 hour of relief, these injections are considered successful.

## 2023-04-24 ENCOUNTER — HOSPITAL ENCOUNTER (OUTPATIENT)
Facility: SURGERY CENTER | Age: 73
Setting detail: HOSPITAL OUTPATIENT SURGERY
Discharge: HOME | End: 2023-04-24
Attending: ANESTHESIOLOGY | Admitting: ANESTHESIOLOGY
Payer: MEDICARE

## 2023-04-24 ENCOUNTER — HOSPITAL ENCOUNTER (OUTPATIENT)
Dept: GENERAL RADIOLOGY | Facility: SURGERY CENTER | Age: 73
Setting detail: HOSPITAL OUTPATIENT SURGERY
End: 2023-04-24
Payer: MEDICARE

## 2023-04-24 ENCOUNTER — TRANSCRIBE ORDERS (OUTPATIENT)
Dept: ADMINISTRATIVE | Facility: HOSPITAL | Age: 73
End: 2023-04-24
Payer: MEDICARE

## 2023-04-24 DIAGNOSIS — Z78.0 POSTMENOPAUSAL: Primary | ICD-10-CM

## 2023-04-24 DIAGNOSIS — M47.812 ARTHROPATHY OF CERVICAL FACET JOINT: ICD-10-CM

## 2023-04-24 DIAGNOSIS — Z41.9 SURGERY, ELECTIVE: ICD-10-CM

## 2023-04-24 PROCEDURE — 77002 NEEDLE LOCALIZATION BY XRAY: CPT

## 2023-04-24 RX ORDER — SODIUM CHLORIDE 0.9 % (FLUSH) 0.9 %
10 SYRINGE (ML) INJECTION EVERY 12 HOURS SCHEDULED
Status: DISCONTINUED | OUTPATIENT
Start: 2023-04-24 | End: 2023-04-24 | Stop reason: HOSPADM

## 2023-04-24 RX ORDER — SULFAMETHOXAZOLE AND TRIMETHOPRIM 800; 160 MG/1; MG/1
1 TABLET ORAL EVERY 12 HOURS SCHEDULED
COMMUNITY
Start: 2023-04-17

## 2023-04-24 RX ORDER — SODIUM CHLORIDE 0.9 % (FLUSH) 0.9 %
10 SYRINGE (ML) INJECTION AS NEEDED
Status: DISCONTINUED | OUTPATIENT
Start: 2023-04-24 | End: 2023-04-24 | Stop reason: HOSPADM

## 2023-04-24 RX ORDER — CEPHALEXIN 500 MG/1
1 CAPSULE ORAL EVERY 12 HOURS SCHEDULED
COMMUNITY
Start: 2023-04-07

## 2023-04-25 ENCOUNTER — TRANSCRIBE ORDERS (OUTPATIENT)
Dept: SURGERY | Facility: SURGERY CENTER | Age: 73
End: 2023-04-25
Payer: MEDICARE

## 2023-04-25 DIAGNOSIS — Z41.9 SURGERY, ELECTIVE: Primary | ICD-10-CM

## 2023-04-26 ENCOUNTER — TRANSCRIBE ORDERS (OUTPATIENT)
Dept: SURGERY | Facility: SURGERY CENTER | Age: 73
End: 2023-04-26
Payer: MEDICARE

## 2023-04-26 DIAGNOSIS — Z41.9 SURGERY, ELECTIVE: Primary | ICD-10-CM

## 2023-04-27 ENCOUNTER — TRANSCRIBE ORDERS (OUTPATIENT)
Dept: SURGERY | Facility: SURGERY CENTER | Age: 73
End: 2023-04-27
Payer: MEDICARE

## 2023-04-27 DIAGNOSIS — Z41.9 SURGERY, ELECTIVE: Primary | ICD-10-CM

## 2023-05-01 ENCOUNTER — PREP FOR SURGERY (OUTPATIENT)
Dept: SURGERY | Facility: SURGERY CENTER | Age: 73
End: 2023-05-01
Payer: MEDICARE

## 2023-05-01 DIAGNOSIS — M47.812 ARTHROPATHY OF CERVICAL FACET JOINT: Primary | ICD-10-CM

## 2023-05-01 RX ORDER — SODIUM CHLORIDE 0.9 % (FLUSH) 0.9 %
10 SYRINGE (ML) INJECTION AS NEEDED
OUTPATIENT
Start: 2023-05-01

## 2023-05-01 RX ORDER — SODIUM CHLORIDE 0.9 % (FLUSH) 0.9 %
10 SYRINGE (ML) INJECTION EVERY 12 HOURS SCHEDULED
OUTPATIENT
Start: 2023-05-01

## 2023-05-02 NOTE — DISCHARGE INSTRUCTIONS
[facility] - Post-procedure Instructions          --  While there are no absolute restrictions, it is recommended that you do not perform strenuous activity today. In the morning, you may resume your level of activity as before your block.    --  If you have a band-aid at your injection site, please remove it later today. Observe the area for any redness, swelling, pus-like drainage, or a temperature over 101°. If any of these symptoms occur, please call your doctor at [phone]. If after office hours, leave a message and the on-call provider will return your call.    --  Ice may be applied to your injection site. It is recommended you avoid direct heat (heating pad; hot tub) for 1-2 days.    --  Call [facility] at [phone] if you experience persistent headache, persistent bleeding from the injection site, or severe pain not relieved by heat or oral medication.    --  Do not make important decisions today.    --  Due to the effects of the block and/or the I.V. Sedation, DO NOT drive or operate hazardous machinery for 12 hours.  Local anesthetics may cause numbness after procedure and precautions must be taken with regards to operating equipment as well as with walking, even if ambulating with assistance of another person or with an assistive device.    --  Do not drink alcohol for 12 hours.    -- You may return to work tomorrow, or as directed by your referring doctor.    --  Occasionally you may notice a slight increase in your pain after the procedure. This should start to improve within the next 24-48 hours. Radiofrequency ablation procedure pain may last 3-4 weeks.    --  It may take as long as 3-4 days before you notice a gradual improvement in your pain and/or other symptoms.    -- You may continue to take your prescribed pain medication as needed.    --  Some normal possible side effects of steroid use could include fluid retention, increased blood sugar, dull headache,  increased sweating, increased appetite, mood swings and flushing.    --  Diabetics are recommended to watch their blood glucose level closely for 24-48 hours after the injection.    --  Must stay in PACU for 20 min upon arrival and prove no leg weakness before being discharged.    --  IN THE EVENT OF A LIFE THREATENING EMERGENCY, (CHEST PAIN, BREATHING DIFFICULTIES, PARALYSIS…) YOU SHOULD GO TO YOUR NEAREST EMERGENCY ROOM.    --  You should be contacted by our office within 2-3 days to schedule follow up or next appointment date.  If not contacted within 7 days, please call the office at [phone]     If you have even 1 hour of relief, these injections are considered successful.

## 2023-05-03 ENCOUNTER — HOSPITAL ENCOUNTER (OUTPATIENT)
Facility: SURGERY CENTER | Age: 73
Setting detail: HOSPITAL OUTPATIENT SURGERY
Discharge: HOME | End: 2023-05-03
Attending: ANESTHESIOLOGY | Admitting: ANESTHESIOLOGY
Payer: MEDICARE

## 2023-05-03 ENCOUNTER — HOSPITAL ENCOUNTER (OUTPATIENT)
Dept: GENERAL RADIOLOGY | Facility: SURGERY CENTER | Age: 73
Setting detail: HOSPITAL OUTPATIENT SURGERY
End: 2023-05-03
Payer: MEDICARE

## 2023-05-03 VITALS
WEIGHT: 122 LBS | DIASTOLIC BLOOD PRESSURE: 79 MMHG | BODY MASS INDEX: 19.61 KG/M2 | RESPIRATION RATE: 16 BRPM | OXYGEN SATURATION: 96 % | HEIGHT: 66 IN | TEMPERATURE: 98.2 F | SYSTOLIC BLOOD PRESSURE: 132 MMHG | HEART RATE: 70 BPM

## 2023-05-03 DIAGNOSIS — Z41.9 SURGERY, ELECTIVE: ICD-10-CM

## 2023-05-03 PROCEDURE — 64490 INJ PARAVERT F JNT C/T 1 LEV: CPT | Performed by: ANESTHESIOLOGY

## 2023-05-03 PROCEDURE — 64491 INJ PARAVERT F JNT C/T 2 LEV: CPT | Performed by: ANESTHESIOLOGY

## 2023-05-03 PROCEDURE — 25010000002 MIDAZOLAM PER 1 MG: Performed by: ANESTHESIOLOGY

## 2023-05-03 PROCEDURE — 77002 NEEDLE LOCALIZATION BY XRAY: CPT

## 2023-05-03 PROCEDURE — 76000 FLUOROSCOPY <1 HR PHYS/QHP: CPT

## 2023-05-03 RX ORDER — MIDAZOLAM HYDROCHLORIDE 1 MG/ML
INJECTION INTRAMUSCULAR; INTRAVENOUS AS NEEDED
Status: DISCONTINUED | OUTPATIENT
Start: 2023-05-03 | End: 2023-05-03 | Stop reason: HOSPADM

## 2023-05-03 RX ORDER — SODIUM CHLORIDE 0.9 % (FLUSH) 0.9 %
10 SYRINGE (ML) INJECTION EVERY 12 HOURS SCHEDULED
Status: DISCONTINUED | OUTPATIENT
Start: 2023-05-03 | End: 2023-05-03 | Stop reason: HOSPADM

## 2023-05-03 RX ORDER — SODIUM CHLORIDE 0.9 % (FLUSH) 0.9 %
10 SYRINGE (ML) INJECTION AS NEEDED
Status: DISCONTINUED | OUTPATIENT
Start: 2023-05-03 | End: 2023-05-03 | Stop reason: HOSPADM

## 2023-05-03 NOTE — OP NOTE
Cervical Medial Branch Blockade at Bilateral  C4, C5 and C6  [facility]      PREOPERATIVE DIAGNOSIS:  Cervical spondylosis without myelopathy   POSTOPERATIVE DIAGNOSIS:  Same as preoperative diagnosis    PROCEDURE:    Cervical Facet Nerve (medial branch) Blocks, with Fluoroscopy:      LEVELS: bilateral  C4, C5 and C6  1. First level 54098 -LT, RT  2. Second level 49701 -LT, RT    PRE-PROCEDURE DISCUSSION WITH PATIENT:    Risks and complications were discussed with the patient prior to starting the procedure and informed consent was obtained.  We discussed various topics, including but not limited to bleeding, infection, injury, nerve injury, paralysis, coma, death, postprocedural soreness or painful flare, worsening of clinical picture, lack of pain relief.  We discussed the diagnostic nature of medial branch blockades.      SURGEON:  Aniyah Wick MD    REASON FOR PROCEDURE:    The patient complains of pain that seems to have a significant axial component Previous diagnostic positivity of a Cervical Medial Branch Blockade at the same levels    SEDATION:  Light sedation was used for this procedure which included and Versed 1mg  ANESTHETIC:   Marcaine 0.25%  STEROID:  None  TOTAL VOLUME OF SOLUTION:  6 mL      DESCRIPTON OF PROCEDURE:  After obtaining informed consent, the patient was placed in the prone position. IV access was obtained.  EKG, blood pressure, and pulse oximeter were monitored and all sedation was administered by an RN under my direct guidance.  The cervical area was prepped with Chloraprep and draped with sterile barrier.     AP fluoroscopic image was used to visualize the waists of the articular pillars on the right side at C4, C5 and C6.  A 25-gauge needle was used to localize the skin with 1% lidocaine.  A 22-gauge spinal needle was then advanced percutaneously through the skin tracts under fluoroscopic guidance in a coaxial view to contact periosteum at each of these target  sites.  Lateral fluoroscopy was then used to advance the needle tips to the midpoint of the articular pillars at each level.  All needle tips were radiographically confirmed to be posterior to the neural foramen. After negative aspiration of each needle, a volume of 1 mL of injectate was administered at each site.      The same procedure was then performed on the contralateral side in the exact same fashion.      Needles were removed intact from all levels.  Vitals were stable throughout.       ESTIMATED BLOOD LOSS:  minimal  SPECIMENS:  None    COMPLICATIONS:    No complications were noted.    PRE-PROCEDURE PAIN SCORE: 0/10 at rest, 9/10 with activity  POST-PROCEDURE PAIN SCORE 0/10    TOLERANCE & DISCHARGE CONDITION:    The patient tolerated the procedure well.  The patient was transported to the recovery area without difficulties.  The patient was discharged to home under the care of family in stable and satisfactory condition.       PLAN OF CARE:  1. The patient was given our standard instruction sheet.  2. We discussed that Cervical Medial Branch Blockade is a diagnostic procedure in consideration for radiofrequency ablation if two diagnostic procedures proved to be positive for significant benefit.  An alternative plan could be therapeutic cervical medial branch blocks on an as-needed basis.  3. The patient is asked to keep an account of their pain postoperatively today.  4. The patient will Return to clinic 1-2 wks.  5. The patient will resume all medications as per the medication reconciliation sheet.

## 2023-05-10 ENCOUNTER — OFFICE VISIT (OUTPATIENT)
Dept: PAIN MEDICINE | Facility: CLINIC | Age: 73
End: 2023-05-10
Payer: MEDICARE

## 2023-05-10 VITALS
SYSTOLIC BLOOD PRESSURE: 96 MMHG | RESPIRATION RATE: 18 BRPM | BODY MASS INDEX: 20.06 KG/M2 | TEMPERATURE: 96.9 F | OXYGEN SATURATION: 97 % | HEART RATE: 80 BPM | DIASTOLIC BLOOD PRESSURE: 61 MMHG | HEIGHT: 66 IN | WEIGHT: 124.8 LBS

## 2023-05-10 DIAGNOSIS — M50.30 DEGENERATION OF CERVICAL INTERVERTEBRAL DISC: ICD-10-CM

## 2023-05-10 DIAGNOSIS — M47.812 ARTHROPATHY OF CERVICAL FACET JOINT: Primary | ICD-10-CM

## 2023-05-10 PROCEDURE — 99214 OFFICE O/P EST MOD 30 MIN: CPT | Performed by: NURSE PRACTITIONER

## 2023-05-10 NOTE — H&P (VIEW-ONLY)
CHIEF COMPLAINT  Follow-up for neck pain.    Subjective   Chely Marie is a 72 y.o. female  who presents to the office for follow-up of procedure.  She completed a Cervical Medial Branch Blockade at Bilateral  C4, C5 and C6   on  5/6/2023 performed by Dr. Wick for management of neck pain. Patient reports 80-90% relief from the procedure for 2 days.     Cervical MBB bilateral C4-C6    on  4/12/2023 ---100% relief from the procedure for duration of local anesthetic       Pertinent surgical history ---  History of cervical fusion in 2006 with Dr. Ward     Interventional pain management ---   several epidural steroid injections at [facility] --- Initially these were helpful     Other conservative treatment ---   physical therapy as well as chiropractic treatment without much benefit     Medications ---  Norco, Skelaxin, Flexeril - sparingly due to drowsiness (PCP)    History of Present Illness     PEG Assessment   What number best describes your pain on average in the past week?2  What number best describes how, during the past week, pain has interfered with your enjoyment of life?0  What number best describes how, during the past week, pain has interfered with your general activity?  0    Review of Pertinent Medical Data ---      The following portions of the patient's history were reviewed and updated as appropriate: allergies, current medications, past family history, past medical history, past social history, past surgical history and problem list.    Review of Systems   Gastrointestinal: Negative for constipation and diarrhea.   Genitourinary: Negative for difficulty urinating.   Musculoskeletal: Negative for neck pain.   Neurological: Negative for weakness and numbness.   Psychiatric/Behavioral: Negative for sleep disturbance and suicidal ideas. The patient is not nervous/anxious.      I have reviewed and confirmed the accuracy of the ROS as documented by the MA/LPN/RN Jennifer Mason,  "APRN     Vitals:    05/10/23 0945   BP: 96/61   Pulse: 80   Resp: 18   Temp: 96.9 °F (36.1 °C)   SpO2: 97%   Weight: 56.6 kg (124 lb 12.8 oz)   Height: 167.6 cm (66\")   PainSc: 0-No pain         Objective   Physical Exam  Vitals and nursing note reviewed.   Constitutional:       General: She is not in acute distress.     Appearance: Normal appearance. She is not ill-appearing.   Pulmonary:      Effort: Pulmonary effort is normal. No respiratory distress.   Musculoskeletal:      Cervical back: Tenderness and bony tenderness present. Pain with movement present.      Comments: +cervical facet tenderness/loading    Neurological:      Mental Status: She is alert and oriented to person, place, and time.      Motor: Motor function is intact. No weakness.      Gait: Gait normal.   Psychiatric:         Mood and Affect: Mood normal.         Behavior: Behavior normal.             Assessment & Plan   Diagnoses and all orders for this visit:    1. Arthropathy of cervical facet joint (Primary)    2. Degeneration of cervical intervertebral disc        --- Bilateral C4-C6 Cervical RFA    Thermal Radiofrequency Destruction    This initial thermal radiofrequency destruction (RFA) of cervical, thoracic, or lumbar paravertebral facet joint (medial branch) nerves is considered medically reasonable and necessary as this patient has met the criteria of having at least two (2) medically reasonable and necessary diagnostic MBBs, with each one providing a consistent minimum of 80% sustained relief of primary (index) pain (with the duration of relief being consistent with the agent used).    Pain / Disability Scale     The scale used for measurement of pain and/or disability for this patient was the [place] back pain disability scale.  The score was 89 on 04/06/2023       --- Follow-up for procedure and in clinic 6-8 weeks post procedure          CATHY REPORT  As part of the patient's treatment plan, I am prescribing controlled substances. " The patient has been made aware of appropriate use of such medications, including potential risk of somnolence, limited ability to drive and/or work safely, and the potential for dependence or overdose. It has also been made clear that these medications are for use by this patient only, without concomitant use of alcohol or other substances unless prescribed.     Patient has completed prescribing agreement detailing terms of continued prescribing of controlled substances, including monitoring CATHY reports, urine drug screening, and pill counts if necessary. The patient is aware that inappropriate use will results in cessation of prescribing such medications.    As the clinician, I personally reviewed the CATHY from 5/10/2023 while the patient was in the office today.    History and physical exam exhibit continued safe and appropriate use of controlled substances.     Dictated utilizing Dragon dictation.

## 2023-05-10 NOTE — PROGRESS NOTES
CHIEF COMPLAINT  Follow-up for neck pain.    Subjective   Chely Marie is a 72 y.o. female  who presents to the office for follow-up of procedure.  She completed a Cervical Medial Branch Blockade at Bilateral  C4, C5 and C6   on  5/6/2023 performed by Dr. Wick for management of neck pain. Patient reports 80-90% relief from the procedure for 2 days.     Cervical MBB bilateral C4-C6    on  4/12/2023 ---100% relief from the procedure for duration of local anesthetic       Pertinent surgical history ---  History of cervical fusion in 2006 with Dr. Ward     Interventional pain management ---   several epidural steroid injections at [facility], [place], operated by [facility] --- Initially these were helpful     Other conservative treatment ---   physical therapy as well as chiropractic treatment without much benefit     Medications ---  Norco, Skelaxin, Flexeril - sparingly due to drowsiness (PCP)    History of Present Illness     PEG Assessment   What number best describes your pain on average in the past week?2  What number best describes how, during the past week, pain has interfered with your enjoyment of life?0  What number best describes how, during the past week, pain has interfered with your general activity?  0    Review of Pertinent Medical Data ---      The following portions of the patient's history were reviewed and updated as appropriate: allergies, current medications, past family history, past medical history, past social history, past surgical history and problem list.    Review of Systems   Gastrointestinal: Negative for constipation and diarrhea.   Genitourinary: Negative for difficulty urinating.   Musculoskeletal: Negative for neck pain.   Neurological: Negative for weakness and numbness.   Psychiatric/Behavioral: Negative for sleep disturbance and suicidal ideas. The patient is not nervous/anxious.      I have reviewed and confirmed the accuracy of the ROS as documented by the MA/LPN/RN Jennifer Mason,  "APRN     Vitals:    05/10/23 0945   BP: 96/61   Pulse: 80   Resp: 18   Temp: 96.9 °F (36.1 °C)   SpO2: 97%   Weight: 56.6 kg (124 lb 12.8 oz)   Height: 167.6 cm (66\")   PainSc: 0-No pain         Objective   Physical Exam  Vitals and nursing note reviewed.   Constitutional:       General: She is not in acute distress.     Appearance: Normal appearance. She is not ill-appearing.   Pulmonary:      Effort: Pulmonary effort is normal. No respiratory distress.   Musculoskeletal:      Cervical back: Tenderness and bony tenderness present. Pain with movement present.      Comments: +cervical facet tenderness/loading    Neurological:      Mental Status: She is alert and oriented to person, place, and time.      Motor: Motor function is intact. No weakness.      Gait: Gait normal.   Psychiatric:         Mood and Affect: Mood normal.         Behavior: Behavior normal.             Assessment & Plan   Diagnoses and all orders for this visit:    1. Arthropathy of cervical facet joint (Primary)    2. Degeneration of cervical intervertebral disc        --- Bilateral C4-C6 Cervical RFA    Thermal Radiofrequency Destruction    This initial thermal radiofrequency destruction (RFA) of cervical, thoracic, or lumbar paravertebral facet joint (medial branch) nerves is considered medically reasonable and necessary as this patient has met the criteria of having at least two (2) medically reasonable and necessary diagnostic MBBs, with each one providing a consistent minimum of 80% sustained relief of primary (index) pain (with the duration of relief being consistent with the agent used).    Pain / Disability Scale     The scale used for measurement of pain and/or disability for this patient was the [place] back pain disability scale.  The score was 89 on 04/06/2023       --- Follow-up for procedure and in clinic 6-8 weeks post procedure          CATHY REPORT  As part of the patient's treatment plan, I am prescribing controlled substances. " The patient has been made aware of appropriate use of such medications, including potential risk of somnolence, limited ability to drive and/or work safely, and the potential for dependence or overdose. It has also been made clear that these medications are for use by this patient only, without concomitant use of alcohol or other substances unless prescribed.     Patient has completed prescribing agreement detailing terms of continued prescribing of controlled substances, including monitoring CATHY reports, urine drug screening, and pill counts if necessary. The patient is aware that inappropriate use will results in cessation of prescribing such medications.    As the clinician, I personally reviewed the CATHY from 5/10/2023 while the patient was in the office today.    History and physical exam exhibit continued safe and appropriate use of controlled substances.     Dictated utilizing Dragon dictation.

## 2023-05-17 ENCOUNTER — OFFICE (OUTPATIENT)
Dept: URBAN - METROPOLITAN AREA CLINIC 66 | Facility: CLINIC | Age: 73
End: 2023-05-17

## 2023-05-17 VITALS
WEIGHT: 125 LBS | HEIGHT: 66 IN | HEART RATE: 73 BPM | DIASTOLIC BLOOD PRESSURE: 64 MMHG | SYSTOLIC BLOOD PRESSURE: 100 MMHG

## 2023-05-17 DIAGNOSIS — A04.5 CAMPYLOBACTER ENTERITIS: ICD-10-CM

## 2023-05-17 DIAGNOSIS — F10.10 ALCOHOL ABUSE, UNCOMPLICATED: ICD-10-CM

## 2023-05-17 DIAGNOSIS — A04.72 ENTEROCOLITIS DUE TO CLOSTRIDIUM DIFFICILE, NOT SPECIFIED AS: ICD-10-CM

## 2023-05-17 DIAGNOSIS — F11.90 OPIOID USE, UNSPECIFIED, UNCOMPLICATED: ICD-10-CM

## 2023-05-17 DIAGNOSIS — R19.7 DIARRHEA, UNSPECIFIED: ICD-10-CM

## 2023-05-17 PROCEDURE — 99213 OFFICE O/P EST LOW 20 MIN: CPT | Performed by: NURSE PRACTITIONER

## 2023-05-22 NOTE — DISCHARGE INSTRUCTIONS
[facility] - Post-procedure Instructions          --  While there are no absolute restrictions, it is recommended that you do not perform strenuous activity today. In the morning, you may resume your level of activity as before your block.    --  If you have a band-aid at your injection site, please remove it later today. Observe the area for any redness, swelling, pus-like drainage, or a temperature over 101°. If any of these symptoms occur, please call your doctor at [phone]. If after office hours, leave a message and the on-call provider will return your call.    --  Ice may be applied to your injection site. It is recommended you avoid direct heat (heating pad; hot tub) for 1-2 days.    --  Call [facility] [place] [facility] at [phone] if you experience persistent headache, persistent bleeding from the injection site, or severe pain not relieved by heat or oral medication.    --  Do not make important decisions today.    --  Due to the effects of the block and/or the I.V. Sedation, DO NOT drive or operate hazardous machinery for 12 hours.  Local anesthetics may cause numbness after procedure and precautions must be taken with regards to operating equipment as well as with walking, even if ambulating with assistance of another person or with an assistive device.    --  Do not drink alcohol for 12 hours.    -- You may return to work tomorrow, or as directed by your referring doctor.    --  Occasionally you may notice a slight increase in your pain after the procedure. This should start to improve within the next 24-48 hours. Radiofrequency ablation procedure pain may last 3-4 weeks.    --  It may take as long as 3-4 days before you notice a gradual improvement in your pain and/or other symptoms.    -- You may continue to take your prescribed pain medication as needed.    --  Some normal possible side effects of steroid use could include fluid retention, increased blood sugar, dull headache,  "increased sweating, increased appetite, mood swings and flushing.    --  Diabetics are recommended to watch their blood glucose level closely for 24-48 hours after the injection.    --  Must stay in PACU for 20 min upon arrival and prove no leg weakness before being discharged.    --  IN THE EVENT OF A LIFE THREATENING EMERGENCY, (CHEST PAIN, BREATHING DIFFICULTIES, PARALYSIS…) YOU SHOULD GO TO YOUR NEAREST EMERGENCY ROOM.    --  You should be contacted by our office within 2-3 days to schedule follow up or next appointment date.  If not contacted within 7 days, please call the office at [phone]     Radiofrequency ablation (RFA):     - Radiofrequency ablation is a term used to describe cauterization or \"burning.\"   - In pain management, we can use this technique with a special needle to target and destroy areas that are causing your pain.   - In most cases, you must have TWO successful \"test injections\" before you are a candidate for RFA.    After your RFA:   - Because heat is used in this technique, it is common to have soreness after the procedure.  Sometimes \"neuritis\" occurs, which feels like tingling, prickly, or sunburn under the skin sensations.   - Ice packs are helpful in decreasing this soreness and preventing post-procedure \"neuritis\" pain.  Use an ice pack for 20 minutes at a time at least 3 times the day of and the day after your procedure.   - It is common to have arm/leg numbness or weakness the day of your procedure, but this should wear off by the following day.   - It may take up to 6 weeks to gain full benefit from this procedure.   "

## 2023-05-22 NOTE — SIGNIFICANT NOTE
Patient educated on the following :    - If you are receiving Sedation for your procedure Nothing to Eat 6 hours and only clear liquids for 2 hours prior to your procedure.    -You will need to have someone drive you home after your PROCEDURE and remain with you for 24 hours after the PROCEDURE  - The date of your procedure, your are welcome to have one visitor at bedside or remain within 10-15 minutes of [facility]  -You will need to arrive at 0915 on 5/24 for this  PROCEDURE  -Please contact [facility] at: [phone] with any questions and/or concerns

## 2023-05-24 ENCOUNTER — HOSPITAL ENCOUNTER (OUTPATIENT)
Dept: GENERAL RADIOLOGY | Facility: SURGERY CENTER | Age: 73
Setting detail: HOSPITAL OUTPATIENT SURGERY
End: 2023-05-24
Payer: MEDICARE

## 2023-05-24 ENCOUNTER — HOSPITAL ENCOUNTER (OUTPATIENT)
Facility: SURGERY CENTER | Age: 73
Setting detail: HOSPITAL OUTPATIENT SURGERY
Discharge: HOME | End: 2023-05-24
Attending: ANESTHESIOLOGY | Admitting: ANESTHESIOLOGY
Payer: MEDICARE

## 2023-05-24 VITALS
HEIGHT: 66 IN | OXYGEN SATURATION: 96 % | RESPIRATION RATE: 16 BRPM | DIASTOLIC BLOOD PRESSURE: 74 MMHG | TEMPERATURE: 98 F | WEIGHT: 124 LBS | BODY MASS INDEX: 19.93 KG/M2 | HEART RATE: 67 BPM | SYSTOLIC BLOOD PRESSURE: 132 MMHG

## 2023-05-24 DIAGNOSIS — M47.812 ARTHROPATHY OF CERVICAL FACET JOINT: ICD-10-CM

## 2023-05-24 DIAGNOSIS — Z41.9 SURGERY, ELECTIVE: ICD-10-CM

## 2023-05-24 PROCEDURE — 64634 DESTROY C/TH FACET JNT ADDL: CPT | Performed by: ANESTHESIOLOGY

## 2023-05-24 PROCEDURE — 64633 DESTROY CERV/THOR FACET JNT: CPT | Performed by: ANESTHESIOLOGY

## 2023-05-24 PROCEDURE — 25010000002 FENTANYL CITRATE (PF) 50 MCG/ML SOLUTION: Performed by: ANESTHESIOLOGY

## 2023-05-24 PROCEDURE — 76000 FLUOROSCOPY <1 HR PHYS/QHP: CPT

## 2023-05-24 PROCEDURE — 25010000002 MIDAZOLAM PER 1 MG: Performed by: ANESTHESIOLOGY

## 2023-05-24 RX ORDER — SODIUM CHLORIDE 0.9 % (FLUSH) 0.9 %
10 SYRINGE (ML) INJECTION AS NEEDED
Status: DISCONTINUED | OUTPATIENT
Start: 2023-05-24 | End: 2023-05-24 | Stop reason: HOSPADM

## 2023-05-24 RX ORDER — FENTANYL CITRATE 50 UG/ML
INJECTION, SOLUTION INTRAMUSCULAR; INTRAVENOUS AS NEEDED
Status: DISCONTINUED | OUTPATIENT
Start: 2023-05-24 | End: 2023-05-24 | Stop reason: HOSPADM

## 2023-05-24 RX ORDER — MIDAZOLAM HYDROCHLORIDE 1 MG/ML
INJECTION INTRAMUSCULAR; INTRAVENOUS AS NEEDED
Status: DISCONTINUED | OUTPATIENT
Start: 2023-05-24 | End: 2023-05-24 | Stop reason: HOSPADM

## 2023-05-24 RX ORDER — SODIUM CHLORIDE 0.9 % (FLUSH) 0.9 %
10 SYRINGE (ML) INJECTION EVERY 12 HOURS SCHEDULED
Status: DISCONTINUED | OUTPATIENT
Start: 2023-05-24 | End: 2023-05-24 | Stop reason: HOSPADM

## 2023-05-24 NOTE — OP NOTE
BILATERAL Cervical Medial Branch Radiofrequency Ablation  [facility]      PREOPERATIVE DIAGNOSIS:  Cervical spondylosis without myelopathy   POSTOPERATIVE DIAGNOSIS:  Same as preoperative diagnosis    PROCEDURE:    Cervical Facet Nerve (medial branch) Radiofrequency Ablation, with Fluoroscopy:  LEVELS C4, C5 and C6  • 11207 - LT, RT Cervical Medial Branch Ablation, 1st level  • 33555 - LT, RT Cervical Medial Branch Ablation, 2nd level    PRE-PROCEDURE DISCUSSION WITH PATIENT:    Risks and complications were discussed with the patient prior to starting the procedure and informed consent was obtained.    SURGEON:  Aniyah Wick MD    REASON FOR PROCEDURE:    The patient complains of pain that seems to have a significant axial component Previous diagnostic positivity of two Cervical Medial Branch Blockades at the same levels    Time of the procedure with sedation: 20 minutes    SEDATION:  Light sedation was used for this procedure which included and Versed 1mg & Fentanyl 50mcg  ANESTHETIC:   2% Lidocaine and 0.25% Bupivacaine  STEROID:  None  TOTAL VOLUME OF SOLUTION:  6mL    DESCRIPTON OF PROCEDURE:  After obtaining informed consent, the patient was placed in the prone position. IV access was obtained.  EKG, blood pressure, and pulse oximeter were monitored and all sedation was administered by an RN under my direct guidance.  The cervical area was prepped with Chloraprep and draped with sterile barrier.     AP fluoroscopic image was used to visualize the waists of the articular pillars on the BILATERAL  side at C4, C5 and C6. The skin and subcutaneous tissue over the lateral edge of each pillar was anesthetized with 1% lidocaine. A 20-gauge 100mm RF Stanley needle was then advanced percutaneously through the anesthetized skin tracts under fluoroscopic guidance in a coaxial view to contact periosteum at each of these target sites.  Lateral fluoroscopy was then used to advance the needle tips to lie  at the midpoints of the articular pillars at each level.  All needle tips were radiographically confirmed to be posterior to the neural foramen.  Sensory stimulation was then performed with good stimulation of the neck at 0.5V or less at each level. Motor stimulation was performed up to 1.5V at each level producing stimulation of the multifidus muscles of the neck and no stimulation of the upper extremity at any level. There was also no change phonation during motor stimulation.  Each level was then anesthetized with 2% lidocaine prior to treatment with pulsed radiofrequency thermocoagulation at 42 degrees Celsius. Each level was then treated with thermal radiofrequency thermocoagulation at 80 degrees Celsius for 60 seconds in two separate cycles.  Prior to the removal of each needle, a volume of 1 mL of 0.25% bupivacaine was administered at each site.      Needles were removed intact from all levels.  Vitals were stable throughout.       ESTIMATED BLOOD LOSS:  minimal  SPECIMENS:  None    COMPLICATIONS:    No complications were noted.    TOLERANCE & DISCHARGE CONDITION:    The patient tolerated the procedure well.  The patient was transported to the recovery area without difficulties.  The patient was discharged to home under the care of family in stable and satisfactory condition.     PLAN OF CARE:  1. The patient was given our standard instruction sheet.  2. The patient will Return to clinic in 8 wks.  3. The patient will resume all medications as per the medication reconciliation sheet.

## 2023-06-02 ENCOUNTER — TELEPHONE (OUTPATIENT)
Dept: PAIN MEDICINE | Facility: CLINIC | Age: 73
End: 2023-06-02

## 2023-06-02 NOTE — TELEPHONE ENCOUNTER
She is only a week out from procedure.  RFA can take longer to work.  Recommend ice, OTC topical lidocaine (patches, roll on), Tylenol, NSAID's.

## 2023-06-02 NOTE — TELEPHONE ENCOUNTER
Provider: ANTHONY DIA    Caller: VELASQUEZ MONTGOMERY    Relationship to Patient: SPOUSE (ON [facility])    Pharmacy: [facility] [address]    Phone Number: [phone]    Reason for Call: PT'S  SAID THAT PT IS STILL EXPERIENCING PAIN AFTER HER ABLATION WITH DR BAIRD. HE SAID THAT ANTHONY HAD MENTIONED THERE WAS SOMETHING THAT COULD BE PRESCRIBED IF THE PT WAS STILL EXPERIENCING PAIN THIS FAR OUT. HE WOULD LIKE TO HAVE SOMEONE CALL HIM BACK TO DISCUSS THIS.

## 2023-06-19 ENCOUNTER — TELEPHONE (OUTPATIENT)
Dept: NEUROSURGERY | Facility: CLINIC | Age: 73
End: 2023-06-19

## 2023-06-19 ENCOUNTER — TELEPHONE (OUTPATIENT)
Dept: PAIN MEDICINE | Facility: CLINIC | Age: 73
End: 2023-06-19

## 2023-06-19 NOTE — TELEPHONE ENCOUNTER
Caller: Abhay Marie    Relationship: Emergency Contact    Best call back number:     What is the best time to reach you: ANY    Who are you requesting to speak with (clinical staff, provider,  specific staff member):CLINICAL    What was the call regarding: PATIENT HAD SOME QUESTIONS ABOUT RFA INJECTION GIVEN     Is it okay if the provider responds through [facility]: PLEASE CALL BACK

## 2023-06-19 NOTE — TELEPHONE ENCOUNTER
Caller: VELASQUEZ MONTGOMERY    Relationship: SPOUSE    Best call back number: [phone]    What was the call regarding: PATIENTS  CALLED AND STATES HIS WIFE HAD AN ABLATION AND THEY ARE GOING ON A TRIP AND HE HAD SOME QUESTIONS BEFORE THEY LEAVE.  HE STATES THEY WILL BE GONE FOR 16 DAYS AND STATES THE PATIENT IS STILL HAVING SOME ISSUES.  THEY ARE SCHEDULED TO LEAVE NEXT THURSDAY 06/29/2023    PLEASE CALL PATIENTS   THANK YOU

## 2023-06-20 NOTE — TELEPHONE ENCOUNTER
I spoke with Mr. Marie and he states that his wife is still having pain and discomfort from her RFA done on 5/24/2023. He wanted to know if that was normal. I let him know that the procedure can take up to 6 weeks before his wife feels any relief and that it's normal to still have some pain and discomfort. He states that [place] are going [place] on 6[place]29[place]. I told him his her pain hasn't gotten any better by next week to call back and we can see if the provider has any other suggestions on what to do about the pain before they leave.

## 2023-06-21 NOTE — TELEPHONE ENCOUNTER
Called patient about the questions they have. Patients  states they just want to know about kinetics for the pain. If it would be worth it or a waste of time?

## 2023-07-19 PROBLEM — M54.2 CHRONIC NECK PAIN: Status: ACTIVE | Noted: 2023-07-19

## 2023-07-19 PROBLEM — G89.29 CHRONIC NECK PAIN: Status: ACTIVE | Noted: 2023-07-19

## 2023-07-26 ENCOUNTER — OFFICE VISIT (OUTPATIENT)
Dept: PAIN MEDICINE | Facility: CLINIC | Age: 73
End: 2023-07-26
Payer: MEDICARE

## 2023-07-26 VITALS
BODY MASS INDEX: 20.51 KG/M2 | TEMPERATURE: 97.1 F | OXYGEN SATURATION: 96 % | WEIGHT: 127.6 LBS | SYSTOLIC BLOOD PRESSURE: 100 MMHG | HEIGHT: 66 IN | DIASTOLIC BLOOD PRESSURE: 60 MMHG | HEART RATE: 77 BPM | RESPIRATION RATE: 12 BRPM

## 2023-07-26 DIAGNOSIS — M47.812 ARTHROPATHY OF CERVICAL FACET JOINT: Primary | ICD-10-CM

## 2023-07-26 PROCEDURE — 99212 OFFICE O/P EST SF 10 MIN: CPT | Performed by: NURSE PRACTITIONER

## 2023-07-26 PROCEDURE — 1160F RVW MEDS BY RX/DR IN RCRD: CPT | Performed by: NURSE PRACTITIONER

## 2023-07-26 PROCEDURE — 1126F AMNT PAIN NOTED NONE PRSNT: CPT | Performed by: NURSE PRACTITIONER

## 2023-07-26 PROCEDURE — 1159F MED LIST DOCD IN RCRD: CPT | Performed by: NURSE PRACTITIONER

## 2023-07-26 NOTE — PROGRESS NOTES
CHIEF COMPLAINT  RADIOFREQUENCY ABLATION NERVES Bilateral C4-C6  5-24-23  Patient reports 100% pain relief from RFA states it remains effective.      Subjective   Chely Marie is a 72 y.o. female  who presents to the office for follow-up of procedure.  She completed a bilateral C4-C6 RFA   on  5/24/2023 performed by Dr. Wick for management of neck pain. Patient reports 100% relief from the procedure.     History of Present Illness     PEG Assessment   What number best describes your pain on average in the past week?0  What number best describes how, during the past week, pain has interfered with your enjoyment of life?0  What number best describes how, during the past week, pain has interfered with your general activity?  0    Review of Pertinent Medical Data ---      The following portions of the patient's history were reviewed and updated as appropriate: allergies, current medications, past family history, past medical history, past social history, past surgical history, and problem list.    Review of Systems   Constitutional:  Positive for activity change. Negative for fatigue and fever.   Respiratory:  Negative for cough and chest tightness.    Cardiovascular:  Negative for chest pain.   Gastrointestinal:  Negative for abdominal pain, constipation and diarrhea.   Genitourinary:  Negative for difficulty urinating and dysuria.   Musculoskeletal:  Positive for neck pain.   Neurological:  Negative for dizziness, weakness, light-headedness and numbness.   Psychiatric/Behavioral:  Negative for agitation, sleep disturbance and suicidal ideas. The patient is not nervous/anxious.      I have reviewed and confirmed the accuracy of the ROS as documented by the MA/MANASA/RN SUSHMA Sellers     Vitals:    07/26/23 1027   BP: 100/60   BP Location: Left arm   Patient Position: Sitting   Cuff Size: Adult   Pulse: 77   Resp: 12   Temp: 97.1 °F (36.2 °C)   TempSrc: Temporal   SpO2: 96%   Weight: 57.9 kg (127 lb 9.6  "oz)   Height: 167.6 cm (65.98\")   PainSc: 0-No pain         Objective   Physical Exam  Vitals and nursing note reviewed.   Constitutional:       General: She is not in acute distress.  Neurological:      Mental Status: She is alert.         Assessment & Plan   Diagnoses and all orders for this visit:    1. Arthropathy of cervical facet joint (Primary)      --- Cervical RFA can be repeated as needed. No more often than every 6 months     Chely Marie reports a pain score of 0.  Given her pain assessment as noted, treatment options were discussed and the following options were decided upon as a follow-up plan to address the patient's pain:  see plan above .      --- Follow-up PRN                    Dictated utilizing Dragon dictation.     "

## 2023-08-21 ENCOUNTER — TELEPHONE (OUTPATIENT)
Dept: PAIN MEDICINE | Facility: CLINIC | Age: 73
End: 2023-08-21

## 2023-08-21 NOTE — TELEPHONE ENCOUNTER
Caller: MR MONTGOMERY  Relationship to Patient: SELF  Phone Number: [phone]   Reason for Call: PATIENTS  CALLING STATING THAT HIS WIFE IS HAVING SOME ISSUES AFTER THE PROCEDURE ASKING FOR A CALL BACK

## 2023-08-22 RX ORDER — METHYLPREDNISOLONE 4 MG/1
TABLET ORAL
Qty: 21 TABLET | Refills: 0 | Status: SHIPPED | OUTPATIENT
Start: 2023-08-22

## 2023-08-22 NOTE — TELEPHONE ENCOUNTER
Mr. Marie called today and states that for last 10 days his wife has had increased pain on the left side of her neck. She has had to take her muscle relaxer and Tylenol for the last 5 days that only helped a couple of time. She has had to take her pain medication 2 or 3 times. The pain medication helps but then she is in bed all day. He would like to know if maybe the RF on the left side of her neck might not have been in the right spot. He would like to know what else can be done to help her pain.

## 2023-08-22 NOTE — TELEPHONE ENCOUNTER
I spoke with  Kwanjazmín and he would like for you to send in a Rx for a steroid to his wife's pharmacy. She doesn't need a refill on her muscle relaxer.

## 2023-08-22 NOTE — TELEPHONE ENCOUNTER
We could try calling in a steroid to see if that calms it down. I would also suggest that they continue to utilize the muscle relaxant/tylenol/NSAID (if she is able to take them) regularly along with heat and topical such as lidocaine patches.  Let me know if she wants the steroid and if she needs the muscle relaxant refilled.  If not improving we will need to see her in the clinic.

## 2023-10-04 ENCOUNTER — TRANSCRIBE ORDERS (OUTPATIENT)
Dept: ADMINISTRATIVE | Facility: HOSPITAL | Age: 73
End: 2023-10-04
Payer: MEDICARE

## 2023-10-04 DIAGNOSIS — M81.0 SENILE OSTEOPOROSIS: Primary | ICD-10-CM

## 2023-10-17 NOTE — H&P
[facility]    History and Physical    Patient Name: Chely Marie  :  1950  MRN:  [id number]  Date of Admission: 2022    Subjective     Patient is a 72 y.o. female presents with chief complaint of chronic, moderate neck & left shoulder pain.  Had been having right shoulder pain but has mostly resolved on today's visit.  Onset of symptoms was gradual starting a few years ago.  Symptoms are associated/aggravated by nothing in particular. Symptoms improve with injection - more than 50% relief of pain following prior dalton.  Presents for dalton today.      The following portions of the patients history were reviewed and updated as appropriate: current medications, allergies, past medical history, past surgical history, past family history, past social history and problem list                Objective     Past Medical History:   Past Medical History:   Diagnosis Date   • Allergies    • Ankle fracture     LEFT   • Clostridioides difficile infection    • DDD (degenerative disc disease), lumbar    • Degeneration of cervical intervertebral disc    • Neck pain    • Osteoporosis    • PONV (postoperative nausea and vomiting)    • Varicose vein      Past Surgical History:   Past Surgical History:   Procedure Laterality Date   • ANKLE OPEN REDUCTION INTERNAL FIXATION Left 10/29/2018    Procedure: LT ANKLE OPEN REDUCTION INTERNAL FIXATION;  Surgeon: Enmanuel Camarena MD;  Location: [facility] OR [facility];  Service: Orthopedics   • ANTERIOR CERVICAL FUSION Right     C5-6; Dr. Ward   • BLADDER SURGERY     • COLONOSCOPY N/A 10/7/2020    Procedure: COLONOSCOPY INTO CECUM WITH COLD BX POLYPECTOMY;  Surgeon: Da Serrano MD;  Location: [facility] ENDOSCOPY;  Service: Gastroenterology;  Laterality: N/A;  PRE:  SCREENING  POST:  POLYP   • HYSTERECTOMY     • LUMBAR DISCECTOMY     • TONSILLECTOMY     • VEIN SURGERY       Family History:   Family History   Problem Relation Age of Onset   • Alzheimer's disease Other     • No Known Problems Mother    • No Known Problems Father    • Malig Hyperthermia Neg Hx      Social History:   Social History     Socioeconomic History   • Marital status:    Tobacco Use   • Smoking status: Never Smoker   • Smokeless tobacco: Never Used   Substance and Sexual Activity   • Alcohol use: Yes     Comment: Social   • Drug use: No   • Sexual activity: Defer       Vital Signs Range for the last 24 hours  Temperature: Temp:  [36.5 °C (97.7 °F)] 36.5 °C (97.7 °F)   Temp Source: Temp src: Oral   BP: BP: (150)/(86) 150/86   Pulse: Heart Rate:  [70] 70   Respirations: Resp:  [16] 16   SPO2: SpO2:  [98 %] 98 %   O2 Amount (l/min):     O2 Devices     Weight:           --------------------------------------------------------------------------------    Current Outpatient Medications   Medication Sig Dispense Refill   • azelastine (OPTIVAR) 0.05 % ophthalmic solution 1 drop 2 (Two) Times a Day.     • calcium carbonate-cholecalciferol 500-400 MG-UNIT tablet tablet Take  by mouth Daily.     • cetirizine (zyrTEC) 10 MG tablet Take 10 mg by mouth Daily.     • Cholecalciferol (Vitamin D3) 50 MCG (2000 UT) tablet Take  by mouth Daily.     • cyclobenzaprine (FLEXERIL) 10 MG tablet Take 1 tablet by mouth 3 (Three) Times a Day As Needed for Muscle Spasms. 40 tablet 5   • denosumab (Prolia) 60 MG/ML solution prefilled syringe syringe Inject  under the skin into the appropriate area as directed 1 (One) Time.     • estradiol (ESTRACE) 0.1 MG/GM vaginal cream Insert  into the vagina 2 (Two) Times a Week.     • fluticasone (FLONASE) 50 MCG/ACT nasal spray 1 spray into the nostril(s) as directed by provider Daily.     • HYDROcodone-acetaminophen (NORCO) 5-325 MG per tablet Take 1 tablet by mouth See Admin Instructions. Take 1 tablet by mouth every 4-6 hours as needed for pain     • metaxalone (SKELAXIN) 800 MG tablet Take 1 tablet by mouth every 8 (eight) hours as needed.     • Multiple Vitamins-Minerals (CENTRUM SILVER  ADULT 50+) tablet Take 1 tablet by mouth daily.     • Probiotic Product (PROBIOTIC DAILY PO) Take  by mouth Daily.     • promethazine (PHENERGAN) 25 MG tablet Take 25 mg by mouth Every 6 (Six) Hours As Needed.     • vitamin B-12 (CYANOCOBALAMIN) 1000 MCG tablet Take 1,000 mcg by mouth Daily.       No current facility-administered medications for this encounter.       --------------------------------------------------------------------------------  Assessment & Plan      Anesthesia Evaluation     Patient summary reviewed and Nursing notes reviewed   history of anesthetic complications: PONV  NPO Solid Status: > 8 hours             Airway   Mallampati: II  Dental      Pulmonary - negative pulmonary ROS   Cardiovascular - negative cardio ROS  Exercise tolerance: good (4-7 METS)        Neuro/Psych- negative ROS  GI/Hepatic/Renal/Endo - negative ROS     Musculoskeletal     (+) chronic pain, neck pain, radiculopathy  Abdominal    Substance History - negative use     OB/GYN negative ob/gyn ROS         Other   arthritis,                 Diagnosis and Plan    Treatment Plan  ASA 2   Patient has had previous injection/procedure with 50-75% improvement.   Procedures: Cervical Epidural Steroid Injection(PETRONA), With fluoroscopy,       Anesthetic plan and risks discussed with patient.          Diagnosis     * DDD (degenerative disc disease), cervical [M50.30]     * Cervical pain (neck) [M54.2]     * Cervical radiculopathy [M54.12]                     Rituxan Counseling:  I discussed with the patient the risks of Rituxan infusions. Side effects can include infusion reactions, severe drug rashes including mucocutaneous reactions, reactivation of latent hepatitis and other infections and rarely progressive multifocal leukoencephalopathy.  All of the patient's questions and concerns were addressed.

## 2023-10-18 DIAGNOSIS — M80.00XD AGE-RELATED OSTEOPOROSIS WITH CURRENT PATHOLOGICAL FRACTURE WITH ROUTINE HEALING: Primary | ICD-10-CM

## 2023-10-23 ENCOUNTER — APPOINTMENT (OUTPATIENT)
Dept: WOMENS IMAGING | Facility: HOSPITAL | Age: 73
End: 2023-10-23
Payer: MEDICARE

## 2023-10-23 PROCEDURE — 77063 BREAST TOMOSYNTHESIS BI: CPT | Performed by: RADIOLOGY

## 2023-10-23 PROCEDURE — 77067 SCR MAMMO BI INCL CAD: CPT | Performed by: RADIOLOGY

## 2023-10-24 ENCOUNTER — LAB (OUTPATIENT)
Dept: OTHER | Facility: HOSPITAL | Age: 73
End: 2023-10-24
Payer: MEDICARE

## 2023-10-24 ENCOUNTER — INFUSION (OUTPATIENT)
Dept: ONCOLOGY | Facility: HOSPITAL | Age: 73
End: 2023-10-24
Payer: MEDICARE

## 2023-10-24 VITALS — TEMPERATURE: 97.1 F | RESPIRATION RATE: 15 BRPM | BODY MASS INDEX: 20.29 KG/M2 | HEIGHT: 67 IN

## 2023-10-24 DIAGNOSIS — M80.00XD AGE-RELATED OSTEOPOROSIS WITH CURRENT PATHOLOGICAL FRACTURE WITH ROUTINE HEALING: ICD-10-CM

## 2023-10-24 DIAGNOSIS — M80.00XD AGE-RELATED OSTEOPOROSIS WITH CURRENT PATHOLOGICAL FRACTURE WITH ROUTINE HEALING: Primary | ICD-10-CM

## 2023-10-24 LAB
ALBUMIN SERPL-MCNC: 4.2 G/DL (ref 3.5–5.2)
ALBUMIN/GLOB SERPL: 1.8 G/DL
ALP SERPL-CCNC: 52 U/L (ref 39–117)
ALT SERPL W P-5'-P-CCNC: 12 U/L (ref 1–33)
ANION GAP SERPL CALCULATED.3IONS-SCNC: 9.8 MMOL/L (ref 5–15)
AST SERPL-CCNC: 20 U/L (ref 1–32)
BILIRUB SERPL-MCNC: 0.4 MG/DL (ref 0–1.2)
BUN SERPL-MCNC: 25 MG/DL (ref 8–23)
BUN/CREAT SERPL: 25 (ref 7–25)
CALCIUM SPEC-SCNC: 10.1 MG/DL (ref 8.6–10.5)
CHLORIDE SERPL-SCNC: 103 MMOL/L (ref 98–107)
CO2 SERPL-SCNC: 27.2 MMOL/L (ref 22–29)
CREAT SERPL-MCNC: 1 MG/DL (ref 0.57–1)
EGFRCR SERPLBLD CKD-EPI 2021: 60 ML/MIN/1.73
GLOBULIN UR ELPH-MCNC: 2.3 GM/DL
GLUCOSE SERPL-MCNC: 85 MG/DL (ref 65–99)
MAGNESIUM SERPL-MCNC: 2.1 MG/DL (ref 1.6–2.4)
PHOSPHATE SERPL-MCNC: 3.2 MG/DL (ref 2.5–4.5)
POTASSIUM SERPL-SCNC: 3.9 MMOL/L (ref 3.5–5.2)
PROT SERPL-MCNC: 6.5 G/DL (ref 6–8.5)
SODIUM SERPL-SCNC: 140 MMOL/L (ref 136–145)

## 2023-10-24 PROCEDURE — 84100 ASSAY OF PHOSPHORUS: CPT | Performed by: INTERNAL MEDICINE

## 2023-10-24 PROCEDURE — 83735 ASSAY OF MAGNESIUM: CPT | Performed by: INTERNAL MEDICINE

## 2023-10-24 PROCEDURE — 96372 THER/PROPH/DIAG INJ SC/IM: CPT

## 2023-10-24 PROCEDURE — 25010000002 DENOSUMAB 60 MG/ML SOLUTION PREFILLED SYRINGE: Performed by: INTERNAL MEDICINE

## 2023-10-24 PROCEDURE — 80053 COMPREHEN METABOLIC PANEL: CPT | Performed by: INTERNAL MEDICINE

## 2023-10-24 RX ADMIN — DENOSUMAB 60 MG: 60 INJECTION SUBCUTANEOUS at 10:35

## 2023-10-24 NOTE — NURSING NOTE
Arrived for prolia injection. Indication and side effects reviewed. Denies recent dental work. Labs and medications verified. Prolia administered in left arm without incidence. Instructed to call prescribing MD for any concerns or questions and instructed on how to schedule future appts.  Pt verbalized understanding and discharged in stable condition.

## 2023-12-12 ENCOUNTER — TELEPHONE (OUTPATIENT)
Dept: PAIN MEDICINE | Facility: CLINIC | Age: 73
End: 2023-12-12
Payer: MEDICARE

## 2023-12-12 NOTE — TELEPHONE ENCOUNTER
Caller: VELASQUEZ   Relationship to Patient: SPOUSE   Phone Number: [phone] (home)     Reason for Call: PATIENTS  CALLING STATING THAT THE PATIENTS PAIN IS STARTING TO RETURN THEY ARE UNSURE IF THE PATIENT SHOULD TAKE ANOTHER STEROID DOSE PACK OR IF THEY SHOULD SCHEDULE ANOTHER ABLATION.

## 2023-12-12 NOTE — TELEPHONE ENCOUNTER
Please see if the steroid helped previously.  She is Dr. Wick's patient so will be a while before we can get her scheduled for a repeat RFA.  I would be happy to send a steroid if it helped last time and then we can see her in clinic sometime in Jan to see how she is doing and schedule repeat RFA if needed.

## 2023-12-19 RX ORDER — METHYLPREDNISOLONE 4 MG/1
TABLET ORAL
Qty: 21 TABLET | Refills: 0 | Status: SHIPPED | OUTPATIENT
Start: 2023-12-19

## 2023-12-19 NOTE — TELEPHONE ENCOUNTER
PATIENT SPOUSE CALLING REGARDING PREVIOUS MESSAGES; STATES HE DID NOT RECEIVE A VM AND WOULD LIKE A CALLBACK ASAP    UNABLE TO WARM TRANSFER

## 2024-01-16 ENCOUNTER — PREP FOR SURGERY (OUTPATIENT)
Dept: SURGERY | Facility: SURGERY CENTER | Age: 74
End: 2024-01-16
Payer: MEDICARE

## 2024-01-16 ENCOUNTER — OFFICE VISIT (OUTPATIENT)
Dept: PAIN MEDICINE | Facility: CLINIC | Age: 74
End: 2024-01-16
Payer: MEDICARE

## 2024-01-16 VITALS
SYSTOLIC BLOOD PRESSURE: 106 MMHG | TEMPERATURE: 97.7 F | DIASTOLIC BLOOD PRESSURE: 64 MMHG | HEART RATE: 48 BPM | OXYGEN SATURATION: 96 % | BODY MASS INDEX: 20.12 KG/M2 | WEIGHT: 128.2 LBS | HEIGHT: 67 IN

## 2024-01-16 DIAGNOSIS — M47.812 ARTHROPATHY OF CERVICAL FACET JOINT: Primary | ICD-10-CM

## 2024-01-16 DIAGNOSIS — M50.30 DEGENERATION OF CERVICAL INTERVERTEBRAL DISC: ICD-10-CM

## 2024-01-16 PROCEDURE — 99214 OFFICE O/P EST MOD 30 MIN: CPT | Performed by: NURSE PRACTITIONER

## 2024-01-16 PROCEDURE — 1159F MED LIST DOCD IN RCRD: CPT | Performed by: NURSE PRACTITIONER

## 2024-01-16 PROCEDURE — 1126F AMNT PAIN NOTED NONE PRSNT: CPT | Performed by: NURSE PRACTITIONER

## 2024-01-16 PROCEDURE — 1160F RVW MEDS BY RX/DR IN RCRD: CPT | Performed by: NURSE PRACTITIONER

## 2024-01-16 RX ORDER — SODIUM CHLORIDE 0.9 % (FLUSH) 0.9 %
10 SYRINGE (ML) INJECTION AS NEEDED
OUTPATIENT
Start: 2024-01-16

## 2024-01-16 RX ORDER — SODIUM CHLORIDE 0.9 % (FLUSH) 0.9 %
10 SYRINGE (ML) INJECTION EVERY 12 HOURS SCHEDULED
OUTPATIENT
Start: 2024-01-16

## 2024-01-16 NOTE — PROGRESS NOTES
CHIEF COMPLAINT  F/U neck pain    Subjective   Chely Marie is a 73 y.o. female  who presents for follow-up.  She has a history of neck pain.  Reports benefit from RFA wearing off.  Would like to repeat.     Procedures ---  bilateral C4-C6 RFA   on  5/24/2023 --- 100% relief from the procedure x 7 months.      Neck Pain   This is a chronic problem. The current episode started more than 1 year ago. The problem occurs daily. The problem has been waxing and waning. The pain is present in the left side and right side. The quality of the pain is described as burning and aching. The pain is at a severity of 8/10. The pain is moderate. The symptoms are aggravated by position. Pertinent negatives include no chest pain, fever, headaches, numbness or weakness. She has tried NSAIDs, oral narcotics, home exercises, muscle relaxants and chiropractic manipulation for the symptoms. The treatment provided mild relief.     PEG Assessment   What number best describes your pain on average in the past week?8  What number best describes how, during the past week, pain has interfered with your enjoyment of life?10  What number best describes how, during the past week, pain has interfered with your general activity?  10    Review of Pertinent Medical Data ---      The following portions of the patient's history were reviewed and updated as appropriate: allergies, current medications, past family history, past medical history, past social history, past surgical history, and problem list.    Review of Systems   Constitutional:  Positive for fatigue (r/t medication). Negative for activity change, chills and fever.   HENT:  Negative for congestion.    Eyes:  Negative for visual disturbance.   Respiratory:  Negative for chest tightness and shortness of breath.    Cardiovascular:  Negative for chest pain.   Gastrointestinal:  Negative for abdominal pain, constipation and diarrhea.   Genitourinary:  Negative for difficulty urinating,  "dyspareunia and dysuria.   Musculoskeletal:  Positive for neck pain.   Neurological:  Negative for dizziness, weakness, light-headedness, numbness and headaches.   Psychiatric/Behavioral:  Positive for sleep disturbance. Negative for agitation, self-injury and suicidal ideas. The patient is not nervous/anxious.      I have reviewed and confirmed the accuracy of the ROS as documented by the MA/LPN/RN Jenniferjose eduardo MasonSUSHMA    Vitals:    01/16/24 0946   BP: 106/64   Pulse: (!) 48   Temp: 97.7 °F (36.5 °C)   SpO2: 96%   Weight: 58.2 kg (128 lb 3.2 oz)   Height: 168.9 cm (66.5\")   PainSc: 0-No pain         Objective   Physical Exam  Vitals and nursing note reviewed.   Constitutional:       General: She is not in acute distress.     Appearance: Normal appearance. She is not ill-appearing.   Pulmonary:      Effort: Pulmonary effort is normal. No respiratory distress.   Musculoskeletal:      Cervical back: Tenderness and bony tenderness present. Pain with movement present.      Comments: +cervical facet tenderness/loading    Neurological:      Mental Status: She is alert and oriented to person, place, and time.      Motor: Motor function is intact. No weakness.      Gait: Gait normal.   Psychiatric:         Mood and Affect: Mood normal.         Behavior: Behavior normal.         Assessment & Plan   Diagnoses and all orders for this visit:    1. Arthropathy of cervical facet joint (Primary)    2. Degeneration of cervical intervertebral disc      --- Bilateral C4-C6 RFA     Thermal Radiofrequency Destruction    This repeat thermal radiofrequency destruction (RFA) of cervical, thoracic, or lumbar paravertebral facet joint (medial branch) nerves at the same anatomical site is considered medically reasonable and necessary as this patient has met the criteria of having a minimum of consistent 50% improvement in pain for at least six (6) months or at least 50% consistent improvement in the ability to perform previously painful " movements and ADLs as compared to baseline measurement using the same scale.    Reviewed the procedure at length with the patient.  Included in the review was expectations, complications, risk and benefits.The procedure was described in detail and the risks, benefits and alternatives were discussed with the patient (including but not limited to: bleeding, infection, nerve damage, worsening of pain, inability to perform injection, paralysis, seizures, coma, no pain relief and death) who agreed to proceed.  Discussed the potential for sedation if warranted/wanted.  The procedure will plan to be performed at [facility] with fluoroscopic guidance(unless ultrasound is indicated) and could potentially have steroids and contrast dye used. Questions were answered and in a way the patient could understand.  Patient verbalized understanding and wishes to proceed.  This intervention will be ordered.  Discussed with patient that all procedures are part of a multimodal plan of care and include either formal PT or a home exercise program.  Patient has no evidence of coagulopathy or current infection.    Chely Marie reports a pain score of 0.  Given her pain assessment as noted, treatment options were discussed and the following options were decided upon as a follow-up plan to address the patient's pain:  see plan above .    --- Follow-up for cervical RFA                Dictated utilizing Dragon dictation.

## 2024-01-22 ENCOUNTER — OFFICE VISIT (OUTPATIENT)
Dept: NEUROSURGERY | Facility: CLINIC | Age: 74
End: 2024-01-22
Payer: MEDICARE

## 2024-01-22 VITALS
DIASTOLIC BLOOD PRESSURE: 78 MMHG | RESPIRATION RATE: 20 BRPM | WEIGHT: 128 LBS | BODY MASS INDEX: 20.57 KG/M2 | SYSTOLIC BLOOD PRESSURE: 112 MMHG | HEIGHT: 66 IN

## 2024-01-22 DIAGNOSIS — G89.29 CHRONIC NECK PAIN: ICD-10-CM

## 2024-01-22 DIAGNOSIS — M54.2 CHRONIC NECK PAIN: ICD-10-CM

## 2024-01-22 DIAGNOSIS — M50.30 DEGENERATION OF CERVICAL INTERVERTEBRAL DISC: ICD-10-CM

## 2024-01-22 DIAGNOSIS — Z98.1 HISTORY OF FUSION OF CERVICAL SPINE: Primary | ICD-10-CM

## 2024-01-22 PROCEDURE — 1159F MED LIST DOCD IN RCRD: CPT | Performed by: NEUROLOGICAL SURGERY

## 2024-01-22 PROCEDURE — 1160F RVW MEDS BY RX/DR IN RCRD: CPT | Performed by: NEUROLOGICAL SURGERY

## 2024-01-22 PROCEDURE — 99213 OFFICE O/P EST LOW 20 MIN: CPT | Performed by: NEUROLOGICAL SURGERY

## 2024-01-22 NOTE — PROGRESS NOTES
"Subjective   Patient ID: Chely Marie is a 73 y.o. female is here today for follow-up.    This very nice lady is with her .  He tells me since I last saw her she has been evaluated by Dr. Baldwin in [facility] for some memory loss.  She had an ablation by Dr. Wick just before the last visit about 6 months ago.  Its benefits have been significant but they have started to recur and she is scheduled for another ablation by Dr. Wick in March.  She has no significant recurrent arm pain.  She has no motor deficits.  I showed her again how to do the chin tuck and reminded her to try and do it daily to help build her neck core muscles.  Will continue to follow her since she is at risk of developing recurrent arm pain.  She has been fused at C5-C6 long ago in 2005.  Will see her in 11 months.  Hopefully by that time we can see if this upcoming ablation has been a persistent benefit.        History of Present Illness    The following portions of the patient's history were reviewed and updated as appropriate: allergies, current medications, past family history, past medical history, past social history, past surgical history, and problem list.    Review of Systems   Constitutional:  Negative for fever.   Neurological:  Negative for weakness and numbness.   All other systems reviewed and are negative.          Objective     Vitals:    01/22/24 0904   BP: 112/78   BP Location: Left arm   Patient Position: Sitting   Cuff Size: Adult   Resp: 20   Weight: 58.1 kg (128 lb)   Height: 168.9 cm (66.5\")   PainSc: 0-No pain     Body mass index is 20.35 kg/m².    Tobacco Use: Low Risk  (1/22/2024)    Patient History     Smoking Tobacco Use: Never     Smokeless Tobacco Use: Never     Passive Exposure: Not on file          Physical Exam  Constitutional:       Appearance: She is well-developed.   HENT:      Head: Normocephalic and atraumatic.   Eyes:      Extraocular Movements: EOM normal.      Conjunctiva/sclera: Conjunctivae " normal.      Pupils: Pupils are equal, round, and reactive to light.   Neck:      Vascular: No carotid bruit.   Neurological:      Mental Status: She is oriented to person, place, and time.      Coordination: Finger-Nose-Finger Test and Heel to Shin Test normal.      Gait: Gait is intact.      Deep Tendon Reflexes:      Reflex Scores:       Tricep reflexes are 2+ on the right side and 2+ on the left side.       Bicep reflexes are 2+ on the right side and 2+ on the left side.       Brachioradialis reflexes are 2+ on the right side and 2+ on the left side.       Patellar reflexes are 2+ on the right side and 2+ on the left side.       Achilles reflexes are 2+ on the right side and 2+ on the left side.  Psychiatric:         Speech: Speech normal.       Neurologic Exam     Mental Status   Oriented to person, place, and time.   Registration of memory: Good recent and remote memory.   Attention: normal. Concentration: normal.   Speech: speech is normal   Level of consciousness: alert  Knowledge: consistent with education.     Cranial Nerves     CN II   Visual fields full to confrontation.   Visual acuity: normal    CN III, IV, VI   Pupils are equal, round, and reactive to light.  Extraocular motions are normal.     CN V   Facial sensation intact.   Right corneal reflex: normal  Left corneal reflex: normal    CN VII   Facial expression full, symmetric.   Right facial weakness: none  Left facial weakness: none    CN VIII   Hearing: intact    CN IX, X   Palate: symmetric    CN XI   Right sternocleidomastoid strength: normal  Left sternocleidomastoid strength: normal    CN XII   Tongue: not atrophic  Tongue deviation: none    Motor Exam   Muscle bulk: normal  Right arm tone: normal  Left arm tone: normal  Right leg tone: normal  Left leg tone: normal    Strength   Strength 5/5 except as noted.     Sensory Exam   Light touch normal.     Gait, Coordination, and Reflexes     Gait  Gait: normal    Coordination   Finger to nose  coordination: normal  Heel to shin coordination: normal    Reflexes   Right brachioradialis: 2+  Left brachioradialis: 2+  Right biceps: 2+  Left biceps: 2+  Right triceps: 2+  Left triceps: 2+  Right patellar: 2+  Left patellar: 2+  Right achilles: 2+  Left achilles: 2+  Right : 2+  Left : 2+          Assessment & Plan   Independent Review of Radiographic Studies:      I personally reviewed the images from the following studies.    The cervical MRI done on 1/7/2023 shows a fused segment at C5-C6 and some mild adjacent level disc degeneration at C4-C5 and C6-C7. There is no significant root or cord compression. There is some facet disease at multiple levels. Agree with the report.        Medical Decision Making:      I will see her in 11 months.  She will get her cervical ablation by Dr. Wick in early March.  If there is significant arm pain between now and the next visit, she or her  will let me know.        Diagnoses and all orders for this visit:    1. History of fusion of cervical spine (Primary)    2. Chronic neck pain    3. Degeneration of cervical intervertebral disc      Return in about 11 months (around 12/22/2024) for face to face.

## 2024-02-26 RX ORDER — MIDAZOLAM HYDROCHLORIDE 1 MG/ML
1 INJECTION INTRAMUSCULAR; INTRAVENOUS ONCE
Status: COMPLETED | OUTPATIENT
Start: 2024-02-26 | End: 2024-03-04

## 2024-02-26 RX ORDER — FENTANYL CITRATE 50 UG/ML
50 INJECTION, SOLUTION INTRAMUSCULAR; INTRAVENOUS ONCE
Status: COMPLETED | OUTPATIENT
Start: 2024-02-26 | End: 2024-03-04

## 2024-02-26 NOTE — H&P
[facility]   HISTORY AND PHYSICAL    Patient Name: Chely Marie  : 1950  MRN: [id number]  Primary Care Physician:  Sindhu Sanches MD  Date of admission: 3/4/2024    Subjective   Subjective     Chief Complaint: Neck pain    History of Present Illness  Chronic axial neck pain with previous good results from cervical radiofrequency ablation.      Review of Systems   Constitutional:  Negative for chills and fever.   Respiratory:  Negative for cough and shortness of breath.    Musculoskeletal:  Positive for neck pain.        Personal History     Past Medical History:   Diagnosis Date    Allergies     Ankle fracture     LEFT    Clostridioides difficile infection     DDD (degenerative disc disease), lumbar     Degeneration of cervical intervertebral disc     Neck pain     Osteoporosis     PONV (postoperative nausea and vomiting)     Varicose vein        Past Surgical History:   Procedure Laterality Date    ANKLE OPEN REDUCTION INTERNAL FIXATION Left 10/29/2018    Procedure: LT ANKLE OPEN REDUCTION INTERNAL FIXATION;  Surgeon: Enmanuel Camarena MD;  Location: [facility] OR [facility];  Service: Orthopedics    ANTERIOR CERVICAL FUSION Right     C5-6; Dr. Ward    BLADDER SURGERY      COLONOSCOPY N/A 10/07/2020    Procedure: COLONOSCOPY INTO CECUM WITH COLD BX POLYPECTOMY;  Surgeon: Da Serrano MD;  Location: [facility] ENDOSCOPY;  Service: Gastroenterology;  Laterality: N/A;  PRE:  SCREENING  POST:  POLYP    EPIDURAL BLOCK      FRACTURE SURGERY      Ankle surgery    HYSTERECTOMY      MEDIAL BRANCH BLOCK Bilateral 2023    Procedure: CERVICAL/THORACIC MEDIAL BRANCH BLOCK bilateral C4-C6 [id number];  Surgeon: Aniyah Wick MD;  Location: [facility] MAIN OR;  Service: Pain Management;  Laterality: Bilateral;    MEDIAL BRANCH BLOCK Bilateral 5/3/2023    Procedure: CERVICAL/THORACIC MEDIAL BRANCH BLOCK #2 (2023) bilateral C4-C6  [id number];  Surgeon: Aniyah Wick MD;  Location: [facility] MAIN OR;   Service: Pain Management;  Laterality: Bilateral;    NECK SURGERY  2005    RADIOFREQUENCY ABLATION Bilateral 5/24/2023    Procedure: RADIOFREQUENCY ABLATION NERVES Bilateral C4-C6 72191, [id number];  Surgeon: Aniyah Wick MD;  Location: [facility] OR;  Service: Pain Management;  Laterality: Bilateral;    SPINAL FUSION  2005    TONSILLECTOMY      VEIN SURGERY         Family History: family history includes Alzheimer's disease in an other family member; No Known Problems in her father and mother. Otherwise pertinent FHx was reviewed and not pertinent to current issue.    Social History:  reports that she has never smoked. She has never used smokeless tobacco. She reports current alcohol use of about 1.0 standard drink of alcohol per week. She reports that she does not use drugs.    Home Medications:  FLUoxetine, HYDROcodone-acetaminophen, Probiotic Product, Vitamin D3, cetirizine, colesevelam, cyclobenzaprine, donepezil, fluticasone, memantine, metaxalone, multivitamin with minerals, promethazine, and vitamin B-12    Allergies:  Allergies   Allergen Reactions    Amoxicillin GI Intolerance    Molds & Smuts Other (See Comments)     Molds/Outdoor  Other reaction(s): Other (See Comments)  Molds/Outdoor       Objective    Objective     Vitals:        Physical Exam  Constitutional:       General: She is not in acute distress.  Pulmonary:      Effort: Pulmonary effort is normal. No respiratory distress.   Skin:     General: Skin is warm and dry.   Neurological:      Mental Status: She is alert.   Psychiatric:         Mood and Affect: Mood normal.         Thought Content: Thought content normal.         Result Review    Result Review:  I have personally reviewed the results from the time of this admission to 3/4/2024 07:51 EST and agree with these findings:  []  Laboratory list / accordion  []  Microbiology  []  Radiology  []  EKG/Telemetry   []  Cardiology/Vascular   []  Pathology  []  Old records  []  Other:  Most notable  findings include: No new      Assessment & Plan   Assessment / Plan     Brief Patient Summary:  Chely Marie is a 73 y.o. female who has chronic axial neck pain    Active Hospital Problems:  Active Hospital Problems    Diagnosis     **Arthropathy of cervical facet joint      Plan: Cervical radiofrequency ablation      DVT prophylaxis:  No DVT prophylaxis order currently exists.    Aniyah Wick MD

## 2024-02-26 NOTE — DISCHARGE INSTRUCTIONS
[facility] - Post-procedure Instructions          --  While there are no absolute restrictions, it is recommended that you do not perform strenuous activity today. In the morning, you may resume your level of activity as before your block.    --  If you have a band-aid at your injection site, please remove it later today. Observe the area for any redness, swelling, pus-like drainage, or a temperature over 101°. If any of these symptoms occur, please call your doctor at [phone]. If after office hours, leave a message and the on-call provider will return your call.    --  Ice may be applied to your injection site. It is recommended you avoid direct heat (heating pad; hot tub) for 1-2 days.    --  Call [facility] at [phone] if you experience persistent headache, persistent bleeding from the injection site, or severe pain not relieved by heat or oral medication.    --  Do not make important decisions today.    --  Due to the effects of the block and/or the I.V. Sedation, DO NOT drive or operate hazardous machinery for 12 hours.  Local anesthetics may cause numbness after procedure and precautions must be taken with regards to operating equipment as well as with walking, even if ambulating with assistance of another person or with an assistive device.    --  Do not drink alcohol for 12 hours.    -- You may return to work tomorrow, or as directed by your referring doctor.    --  Occasionally you may notice a slight increase in your pain after the procedure. This should start to improve within the next 24-48 hours. Radiofrequency ablation procedure pain may last 3-4 weeks.    --  It may take as long as 3-4 days before you notice a gradual improvement in your pain and/or other symptoms.    -- You may continue to take your prescribed pain medication as needed.    --  Some normal possible side effects of steroid use could include fluid retention, increased blood sugar, dull headache,  "increased sweating, increased appetite, mood swings and flushing.    --  Diabetics are recommended to watch their blood glucose level closely for 24-48 hours after the injection.    --  Must stay in PACU for 20 min upon arrival and prove no leg weakness before being discharged.    --  IN THE EVENT OF A LIFE THREATENING EMERGENCY, (CHEST PAIN, BREATHING DIFFICULTIES, PARALYSIS…) YOU SHOULD GO TO YOUR NEAREST EMERGENCY ROOM.    --  You should be contacted by our office within 2-3 days to schedule follow up or next appointment date.  If not contacted within 7 days, please call the office at [phone]     Radiofrequency ablation (RFA):     - Radiofrequency ablation is a term used to describe cauterization or \"burning.\"   - In pain management, we can use this technique with a special needle to target and destroy areas that are causing your pain.   - In most cases, you must have TWO successful \"test injections\" before you are a candidate for RFA.    After your RFA:   - Because heat is used in this technique, it is common to have soreness after the procedure.  Sometimes \"neuritis\" occurs, which feels like tingling, prickly, or sunburn under the skin sensations.   - Ice packs are helpful in decreasing this soreness and preventing post-procedure \"neuritis\" pain.  Use an ice pack for 20 minutes at a time at least 3 times the day of and the day after your procedure.   - It is common to have arm/leg numbness or weakness the day of your procedure, but this should wear off by the following day.   - It may take up to 6 weeks to gain full benefit from this procedure.   "

## 2024-03-04 ENCOUNTER — TRANSCRIBE ORDERS (OUTPATIENT)
Dept: SURGERY | Facility: SURGERY CENTER | Age: 74
End: 2024-03-04
Payer: MEDICARE

## 2024-03-04 ENCOUNTER — HOSPITAL ENCOUNTER (OUTPATIENT)
Facility: SURGERY CENTER | Age: 74
Setting detail: HOSPITAL OUTPATIENT SURGERY
Discharge: HOME | End: 2024-03-04
Attending: ANESTHESIOLOGY | Admitting: ANESTHESIOLOGY
Payer: MEDICARE

## 2024-03-04 ENCOUNTER — HOSPITAL ENCOUNTER (OUTPATIENT)
Dept: GENERAL RADIOLOGY | Facility: SURGERY CENTER | Age: 74
Setting detail: HOSPITAL OUTPATIENT SURGERY
End: 2024-03-04
Payer: MEDICARE

## 2024-03-04 VITALS
DIASTOLIC BLOOD PRESSURE: 97 MMHG | OXYGEN SATURATION: 97 % | BODY MASS INDEX: 19.78 KG/M2 | HEIGHT: 67 IN | RESPIRATION RATE: 16 BRPM | WEIGHT: 126 LBS | SYSTOLIC BLOOD PRESSURE: 172 MMHG | TEMPERATURE: 98 F | HEART RATE: 88 BPM

## 2024-03-04 DIAGNOSIS — Z41.9 SURGERY, ELECTIVE: Primary | ICD-10-CM

## 2024-03-04 DIAGNOSIS — Z41.9 SURGERY, ELECTIVE: ICD-10-CM

## 2024-03-04 DIAGNOSIS — M47.812 ARTHROPATHY OF CERVICAL FACET JOINT: ICD-10-CM

## 2024-03-04 PROCEDURE — 64634 DESTROY C/TH FACET JNT ADDL: CPT | Performed by: ANESTHESIOLOGY

## 2024-03-04 PROCEDURE — 25010000002 BUPIVACAINE (PF) 0.25 % SOLUTION 10 ML VIAL: Performed by: ANESTHESIOLOGY

## 2024-03-04 PROCEDURE — 64633 DESTROY CERV/THOR FACET JNT: CPT | Performed by: ANESTHESIOLOGY

## 2024-03-04 PROCEDURE — 76000 FLUOROSCOPY <1 HR PHYS/QHP: CPT

## 2024-03-04 PROCEDURE — 25010000002 MIDAZOLAM PER 1MG: Performed by: ANESTHESIOLOGY

## 2024-03-04 PROCEDURE — 25010000002 FENTANYL CITRATE (PF) 50 MCG/ML SOLUTION: Performed by: ANESTHESIOLOGY

## 2024-03-04 RX ORDER — SODIUM CHLORIDE 0.9 % (FLUSH) 0.9 %
10 SYRINGE (ML) INJECTION EVERY 12 HOURS SCHEDULED
Status: DISCONTINUED | OUTPATIENT
Start: 2024-03-04 | End: 2024-03-04 | Stop reason: HOSPADM

## 2024-03-04 RX ORDER — SODIUM CHLORIDE 0.9 % (FLUSH) 0.9 %
10 SYRINGE (ML) INJECTION AS NEEDED
Status: DISCONTINUED | OUTPATIENT
Start: 2024-03-04 | End: 2024-03-04 | Stop reason: HOSPADM

## 2024-03-04 RX ADMIN — MIDAZOLAM HYDROCHLORIDE 1 MG: 2 INJECTION, SOLUTION INTRAMUSCULAR; INTRAVENOUS at 08:49

## 2024-03-04 RX ADMIN — FENTANYL CITRATE 50 MCG: 0.05 INJECTION, SOLUTION INTRAMUSCULAR; INTRAVENOUS at 08:48

## 2024-03-04 NOTE — OP NOTE
BILATERAL Cervical Medial Branch Radiofrequency Ablation  [facility]      PREOPERATIVE DIAGNOSIS:  Cervical spondylosis without myelopathy   POSTOPERATIVE DIAGNOSIS:  Same as preoperative diagnosis    PROCEDURE:    Cervical Facet Nerve (medial branch) Radiofrequency Ablation, with Fluoroscopy:  LEVELS C4, C5, and C6  73378 - 50 Cervical Medial Branch Ablation, 1st level  26001 - 50 Cervical Medial Branch Ablation, 2nd level    PRE-PROCEDURE DISCUSSION WITH PATIENT:    Risks and complications were discussed with the patient prior to starting the procedure and informed consent was obtained.    SURGEON:  Aniyah Wick MD    REASON FOR PROCEDURE:    The patient complains of pain that seems to have a significant axial component Previous clinically significant therapeutic effect after prior Radiofrequency procedure    Time of the procedure with sedation: 5083-5147 minutes    SEDATION:  Anxiolysis was used for this procedure which included Versed 1mg & Fentanyl 50mcg  ANESTHETIC:   2% Lidocaine and 0.25% Bupivacaine  STEROID:  None  TOTAL VOLUME OF SOLUTION:  6mL    DESCRIPTON OF PROCEDURE:  After obtaining informed consent, the patient was placed in the prone position. IV access was obtained.  EKG, blood pressure, and pulse oximeter were monitored and all sedation was administered by an RN under my direct guidance.  The cervical area was prepped with Chloraprep and draped with sterile barrier.     AP fluoroscopic image was used to visualize the waists of the articular pillars on the BILATERAL  side at C4, C5, and C6. The skin and subcutaneous tissue over the lateral edge of each pillar was anesthetized with 1% lidocaine. A 20-gauge 100mm RF Stanley needle was then advanced percutaneously through the anesthetized skin tracts under fluoroscopic guidance in a coaxial view to contact periosteum at each of these target sites.  Lateral fluoroscopy was then used to advance the needle tips to lie at the  midpoints of the articular pillars at each level.  All needle tips were radiographically confirmed to be posterior to the neural foramen.  Sensory stimulation was then performed with good stimulation of the neck at 0.5V or less at each level. Motor stimulation was performed up to 1.5V at each level producing stimulation of the multifidus muscles of the neck and no stimulation of the upper extremity at any level. There was also no change phonation during motor stimulation.  Each level was then anesthetized with 2% lidocaine prior to treatment with pulsed radiofrequency thermocoagulation at 42 degrees Celsius. Each level was then treated with thermal radiofrequency thermocoagulation at 80 degrees Celsius for 60 seconds in two separate cycles.  Prior to the removal of each needle, a volume of 1 mL of 0.25% bupivacaine was administered at each site.      Needles were removed intact from all levels.  Vitals were stable throughout.       ESTIMATED BLOOD LOSS:  minimal  SPECIMENS:  None    COMPLICATIONS:    No complications were noted.    TOLERANCE & DISCHARGE CONDITION:    The patient tolerated the procedure well.  The patient was transported to the recovery area without difficulties.  The patient was discharged to home under the care of family in stable and satisfactory condition.     PLAN OF CARE:  The patient was given our standard instruction sheet.  The patient will Return to clinic in 8 wks.  The patient will resume all medications as per the medication reconciliation sheet.

## 2024-03-11 ENCOUNTER — TELEPHONE (OUTPATIENT)
Dept: PAIN MEDICINE | Facility: CLINIC | Age: 74
End: 2024-03-11

## 2024-03-11 NOTE — TELEPHONE ENCOUNTER
Patient is asking how long before she gets pain relief from ablation. She is still having intermittent pain for which she is taking Tylenol only for currently and using ice. Encouraged  Frank to let patient know if can take several weeks for pain relief and to continue the ice and tylenol as needed. Please advise

## 2024-03-11 NOTE — TELEPHONE ENCOUNTER
Hub staff attempted to follow warm transfer process and was unsuccessful     Caller: Abhay Marie    Relationship to patient: Emergency Contact    Best call back number: [phone]    Patient is needing: HAS QUESTIONS ABOUT ABLATION THAT WAS DONE

## 2024-03-11 NOTE — TELEPHONE ENCOUNTER
Yes, thank you.  It can sometimes take 6 weeks for benefit.  She may also consider using lidocaine patches over the area.

## 2024-04-19 ENCOUNTER — TELEPHONE (OUTPATIENT)
Dept: PAIN MEDICINE | Facility: CLINIC | Age: 74
End: 2024-04-19

## 2024-04-19 NOTE — TELEPHONE ENCOUNTER
Spoke with patient- he was unhappy that he was told his wife would have to reschedule after being 21 minutes late and was wondering if you could just do the visit over the telephone. He also could not believe that there was not any availability for today with the kind of traffic there was out there, surely someone cancelled. I informed him that per out [facility] policy anything after 15 minutes late the patient does have to reschedule their appointment. He then asked to speak with the manager which I transferred him to cy.

## 2024-04-19 NOTE — TELEPHONE ENCOUNTER
Caller: Abhay Marie    Relationship: Emergency Contact    Best call back number: [phone]    What is the best time to reach you: ANY     Who are you requesting to speak with (clinical staff, provider,  specific staff member): ANTHONY DIA     Do you know the name of the person who called: PATIENT INCOMING     What was the call regarding: PATIENTS SPOUSE IS REQUESTING TO TALK TO ANTHONY DIA (AT HER CONVENIENCE) ABOUT HIS WIFES ABLATION APPT.

## 2024-05-07 ENCOUNTER — INFUSION (OUTPATIENT)
Dept: ONCOLOGY | Facility: HOSPITAL | Age: 74
End: 2024-05-07
Payer: MEDICARE

## 2024-05-07 ENCOUNTER — LAB (OUTPATIENT)
Dept: OTHER | Facility: HOSPITAL | Age: 74
End: 2024-05-07
Payer: MEDICARE

## 2024-05-07 DIAGNOSIS — M80.00XD AGE-RELATED OSTEOPOROSIS WITH CURRENT PATHOLOGICAL FRACTURE WITH ROUTINE HEALING: ICD-10-CM

## 2024-05-07 DIAGNOSIS — M80.00XD AGE-RELATED OSTEOPOROSIS WITH CURRENT PATHOLOGICAL FRACTURE WITH ROUTINE HEALING: Primary | ICD-10-CM

## 2024-05-07 LAB
ALBUMIN SERPL-MCNC: 4.4 G/DL (ref 3.5–5.2)
ALBUMIN/GLOB SERPL: 1.7 G/DL
ALP SERPL-CCNC: 56 U/L (ref 39–117)
ALT SERPL W P-5'-P-CCNC: 14 U/L (ref 1–33)
ANION GAP SERPL CALCULATED.3IONS-SCNC: 9 MMOL/L (ref 5–15)
AST SERPL-CCNC: 24 U/L (ref 1–32)
BILIRUB SERPL-MCNC: 0.4 MG/DL (ref 0–1.2)
BUN SERPL-MCNC: 23 MG/DL (ref 8–23)
BUN/CREAT SERPL: 20.4 (ref 7–25)
CALCIUM SPEC-SCNC: 10.6 MG/DL (ref 8.6–10.5)
CHLORIDE SERPL-SCNC: 101 MMOL/L (ref 98–107)
CO2 SERPL-SCNC: 31 MMOL/L (ref 22–29)
CREAT SERPL-MCNC: 1.13 MG/DL (ref 0.57–1)
EGFRCR SERPLBLD CKD-EPI 2021: 51.8 ML/MIN/1.73
GLOBULIN UR ELPH-MCNC: 2.6 GM/DL
GLUCOSE SERPL-MCNC: 93 MG/DL (ref 65–99)
MAGNESIUM SERPL-MCNC: 2.2 MG/DL (ref 1.6–2.4)
PHOSPHATE SERPL-MCNC: 3.6 MG/DL (ref 2.5–4.5)
POTASSIUM SERPL-SCNC: 4.3 MMOL/L (ref 3.5–5.2)
PROT SERPL-MCNC: 7 G/DL (ref 6–8.5)
SODIUM SERPL-SCNC: 141 MMOL/L (ref 136–145)

## 2024-05-07 PROCEDURE — 25010000002 DENOSUMAB 60 MG/ML SOLUTION PREFILLED SYRINGE: Performed by: INTERNAL MEDICINE

## 2024-05-07 PROCEDURE — 96372 THER/PROPH/DIAG INJ SC/IM: CPT

## 2024-05-07 PROCEDURE — 80053 COMPREHEN METABOLIC PANEL: CPT | Performed by: INTERNAL MEDICINE

## 2024-05-07 PROCEDURE — 84100 ASSAY OF PHOSPHORUS: CPT | Performed by: INTERNAL MEDICINE

## 2024-05-07 PROCEDURE — 83735 ASSAY OF MAGNESIUM: CPT | Performed by: INTERNAL MEDICINE

## 2024-05-07 RX ADMIN — DENOSUMAB 60 MG: 60 INJECTION SUBCUTANEOUS at 12:14

## 2024-06-05 ENCOUNTER — TELEPHONE (OUTPATIENT)
Dept: PAIN MEDICINE | Facility: CLINIC | Age: 74
End: 2024-06-05

## 2024-06-05 RX ORDER — METHYLPREDNISOLONE 4 MG/1
TABLET ORAL
Qty: 21 TABLET | Refills: 0 | Status: SHIPPED | OUTPATIENT
Start: 2024-06-05

## 2024-06-05 NOTE — TELEPHONE ENCOUNTER
"Provider: IFEOMA    Caller: VELASQUEZ    Relationship to Patient: SPOUSE    Pharmacy: [facility] [address] [phone]    Phone Number: [phone]    Reason for Call: VELASQUEZ STATES THAT PATIENT HAS BEEN HAVING INCREASED PAIN, HE STATES SHE \" OVER DID IT THE LAST FEW DAYS \". HE STATES THE RFA HAS WORKED WELL, BUT SHE IS HAVING ISSUES WITH THE RIGHT SIDE. HE STATES WHEN SHE HAS ISSUES IT'S ALWAYS ON THE RIGHT. HE STATES SHE NORMALLY DOESN'T TAKE PAIN MEDICATION, BUT SHE HAD TO TAKE SOME AND HAS BEEN IN BED FOR THE LAST DAY.  HE STATES THEY ARE GOING OUT OF TOWN ON SATURDAY, AND IS ASKING IF A STEROID PACK CAN BE CALLED IN FOR PATIENT JUST IN CASE.    "

## 2024-06-27 ENCOUNTER — OFFICE VISIT (OUTPATIENT)
Dept: PAIN MEDICINE | Facility: CLINIC | Age: 74
End: 2024-06-27
Payer: MEDICARE

## 2024-06-27 VITALS
HEART RATE: 63 BPM | BODY MASS INDEX: 20.31 KG/M2 | OXYGEN SATURATION: 98 % | WEIGHT: 129.4 LBS | SYSTOLIC BLOOD PRESSURE: 108 MMHG | HEIGHT: 67 IN | DIASTOLIC BLOOD PRESSURE: 74 MMHG | TEMPERATURE: 98.1 F

## 2024-06-27 DIAGNOSIS — M50.30 DEGENERATION OF CERVICAL INTERVERTEBRAL DISC: ICD-10-CM

## 2024-06-27 DIAGNOSIS — M47.812 ARTHROPATHY OF CERVICAL FACET JOINT: Primary | ICD-10-CM

## 2024-06-27 PROCEDURE — 1126F AMNT PAIN NOTED NONE PRSNT: CPT | Performed by: NURSE PRACTITIONER

## 2024-06-27 PROCEDURE — 1159F MED LIST DOCD IN RCRD: CPT | Performed by: NURSE PRACTITIONER

## 2024-06-27 PROCEDURE — 1160F RVW MEDS BY RX/DR IN RCRD: CPT | Performed by: NURSE PRACTITIONER

## 2024-06-27 PROCEDURE — 99213 OFFICE O/P EST LOW 20 MIN: CPT | Performed by: NURSE PRACTITIONER

## 2024-06-27 RX ORDER — METHYLPREDNISOLONE 4 MG/1
TABLET ORAL
Qty: 21 TABLET | Refills: 0 | Status: SHIPPED | OUTPATIENT
Start: 2024-06-27

## 2024-06-27 NOTE — PROGRESS NOTES
CHIEF COMPLAINT  Neck pain    Subjective   Chely Marie is a 73 y.o. female  who presents to the office for follow-up of procedure.  She completed a RADIOFREQUENCY ABLATION CERVICAL. bilateral C4-C6. on  3/4/24 performed by Dr. Wick for management of neck pain. Patient reports 100% relief from the procedure.  She still has an occasional bad day but is able to recover much more quickly in about a day rather than 3-4 days.      Procedures ---  bilateral C4-C6 RFA  on 5/24/2023 --- 100% relief from the procedure x 7 months.     Pain today 0/10 VAS.  She says PCP no longer wishes to fill medications Cyclobrnzaprine 10 mg, Metaxalone 800 mg PRN, Hydrocodone 5/325.  She takes these very sparingly, maybe a couple of times per month.  She also utilizes Promethazine when she has to take Hydrocodone.  Has only used this a handful of times.      Neck Pain   This is a chronic problem. The current episode started more than 1 year ago. The problem occurs daily. The problem has been waxing and waning. The pain is present in the left side and right side. The quality of the pain is described as burning and aching. The pain is at a severity of 0/10. The pain is mild. The symptoms are aggravated by position. Pertinent negatives include no chest pain, fever, headaches, numbness or weakness. She has tried NSAIDs, oral narcotics, home exercises, muscle relaxants and chiropractic manipulation for the symptoms. The treatment provided mild relief.      PEG Assessment   What number best describes your pain on average in the past week?4  What number best describes how, during the past week, pain has interfered with your enjoyment of life?8  What number best describes how, during the past week, pain has interfered with your general activity?  8    Review of Pertinent Medical Data ---    The following portions of the patient's history were reviewed and updated as appropriate: allergies, current medications, past family history, past  "medical history, past social history, past surgical history, and problem list.    Review of Systems   Constitutional:  Negative for activity change, fatigue and fever.   Cardiovascular:  Negative for chest pain.   Gastrointestinal:  Negative for abdominal pain, constipation and diarrhea.   Genitourinary:  Negative for difficulty urinating and dysuria.   Musculoskeletal:  Positive for neck pain.   Neurological:  Negative for dizziness, weakness, light-headedness, numbness and headaches.   Psychiatric/Behavioral:  Negative for agitation, sleep disturbance and suicidal ideas. The patient is not nervous/anxious.      I have reviewed and confirmed the accuracy of the ROS as documented by the MA/LPN/RN SUSHMA Sellers     Vitals:    06/27/24 1050   BP: 108/74   BP Location: Left arm   Patient Position: Sitting   Cuff Size: Adult   Pulse: 63   Temp: 98.1 °F (36.7 °C)   SpO2: 98%   Weight: 58.7 kg (129 lb 6.4 oz)   Height: 170.2 cm (67\")   PainSc: 0-No pain   PainLoc: Neck         Objective   Physical Exam  Vitals and nursing note reviewed.   Constitutional:       General: She is not in acute distress.     Appearance: Normal appearance. She is not ill-appearing.   Pulmonary:      Effort: Pulmonary effort is normal. No respiratory distress.   Musculoskeletal:      Cervical back: No tenderness.   Neurological:      Mental Status: She is alert and oriented to person, place, and time.      Motor: Motor function is intact. No weakness.      Gait: Gait normal.   Psychiatric:         Mood and Affect: Mood normal.         Behavior: Behavior normal.         Assessment & Plan   Diagnoses and all orders for this visit:    1. Arthropathy of cervical facet joint (Primary)    2. Degeneration of cervical intervertebral disc    Other orders  -     methylPREDNISolone (MEDROL) 4 MG dose pack; Take as directed on package instructions.  Dispense: 21 tablet; Refill: 0      --- Cervical RFA can be repeated every 6 + months PRN  --- MDP " for upcoming vacation which is around the time she will be eligible for next RFA, to have in the event of an acute flare while traveling  --- D/c Cyclobenzaprine.  Continue Metaxalone.   --- I reviewed an extended CATHY which shows that #28 Hydrocodone were filled in November of 2022. She still has about 10 of these left.    --- Continue Hydrocodone for acute/sparing use. Will refill if needed.    ---     Chely TAVARES Frank reports a pain score of 0.  Given her pain assessment as noted, treatment options were discussed and the following options were decided upon as a follow-up plan to address the patient's pain: continuation of current treatment plan for pain.      --- Follow-up approximately 2 months                      CATHY REPORT    As the clinician, I personally reviewed the CAHTY from 06/27/2024 while the patient was in the office today.    History and physical exam exhibit continued safe and appropriate use of controlled substances.         Dictated utilizing Dragon dictation.      I spent 25 minutes caring for Chely on this date of service. This time includes time spent by me in the following activities: preparing for the visit, counseling and educating the patient/family/caregiver, and documenting information in the medical record

## 2024-07-02 ENCOUNTER — PREP FOR SURGERY (OUTPATIENT)
Dept: SURGERY | Facility: SURGERY CENTER | Age: 74
End: 2024-07-02
Payer: MEDICARE

## 2024-07-02 ENCOUNTER — TRANSCRIBE ORDERS (OUTPATIENT)
Dept: SURGERY | Facility: SURGERY CENTER | Age: 74
End: 2024-07-02
Payer: MEDICARE

## 2024-07-02 DIAGNOSIS — M47.812 ARTHROPATHY OF CERVICAL FACET JOINT: Primary | ICD-10-CM

## 2024-07-02 DIAGNOSIS — Z41.9 SURGERY, ELECTIVE: Primary | ICD-10-CM

## 2024-07-02 RX ORDER — SODIUM CHLORIDE 0.9 % (FLUSH) 0.9 %
10 SYRINGE (ML) INJECTION EVERY 12 HOURS SCHEDULED
OUTPATIENT
Start: 2024-07-02

## 2024-07-02 RX ORDER — SODIUM CHLORIDE 0.9 % (FLUSH) 0.9 %
10 SYRINGE (ML) INJECTION AS NEEDED
OUTPATIENT
Start: 2024-07-02

## 2024-07-24 ENCOUNTER — TELEPHONE (OUTPATIENT)
Dept: PAIN MEDICINE | Facility: CLINIC | Age: 74
End: 2024-07-24

## 2024-07-24 NOTE — TELEPHONE ENCOUNTER
Mrs. Marie's  Abhay called today and states that his wife is having a lot of neck pain. She is scheduled for a cervical RF on 9/6/2024. They are scheduled to take a trip from 8/7/24-8/21/24 and SUSHMA Lee gave her a Medrol Dose Pack to take on the trip as a preventive pain medication until she can get her RF done. She is supposed to take 2 pills a day while on the trip. Her  wanted to know if his wife could get another medrol dose pack to take right now to help with her pain. She has some hydro/apap and muscle relaxers at home but he tries not to give them to her very often because his wife has short term memory problems. He was told by her neurologist to try not  to give her the hydro/apap often because it's not good for her memory. Please advise

## 2024-07-24 NOTE — TELEPHONE ENCOUNTER
It's really not safe to have steroids that often either.  If the pain is severe, she could come in for a Toradol injection.  If she can hold off, that's really what I would recommend.  The medications she has at home may affect her memory, but the Toradol can damage her kidneys.  Sadly, there's just a risk with all treatments.

## 2024-07-26 NOTE — TELEPHONE ENCOUNTER
I spoke with Mrs. Marie's  Abhay and relayed the message from Dr. Wick. Mr. Marie will try giving his wife a half of a pain pill at a time to see if that helps her pain first. If that doesn't help then he may bring her in to get a Toradol injection done prior to their trip.

## 2024-08-01 ENCOUNTER — HOSPITAL ENCOUNTER (EMERGENCY)
Facility: HOSPITAL | Age: 74
Discharge: HOME | End: 2024-08-01
Attending: EMERGENCY MEDICINE
Payer: MEDICARE

## 2024-08-01 VITALS
RESPIRATION RATE: 18 BRPM | OXYGEN SATURATION: 96 % | WEIGHT: 125 LBS | DIASTOLIC BLOOD PRESSURE: 79 MMHG | SYSTOLIC BLOOD PRESSURE: 129 MMHG | BODY MASS INDEX: 20.09 KG/M2 | HEART RATE: 61 BPM | HEIGHT: 66 IN | TEMPERATURE: 98.5 F

## 2024-08-01 DIAGNOSIS — G89.29 CHRONIC NECK PAIN: ICD-10-CM

## 2024-08-01 DIAGNOSIS — N39.0 URINARY TRACT INFECTION WITHOUT HEMATURIA, SITE UNSPECIFIED: ICD-10-CM

## 2024-08-01 DIAGNOSIS — R53.1 GENERALIZED WEAKNESS: Primary | ICD-10-CM

## 2024-08-01 DIAGNOSIS — E86.0 MILD DEHYDRATION: ICD-10-CM

## 2024-08-01 DIAGNOSIS — M54.2 CHRONIC NECK PAIN: ICD-10-CM

## 2024-08-01 LAB
ALBUMIN SERPL-MCNC: 4.1 G/DL (ref 3.5–5.2)
ALBUMIN/GLOB SERPL: 1.9 G/DL
ALP SERPL-CCNC: 43 U/L (ref 39–117)
ALT SERPL W P-5'-P-CCNC: 12 U/L (ref 1–33)
ANION GAP SERPL CALCULATED.3IONS-SCNC: 9.8 MMOL/L (ref 5–15)
AST SERPL-CCNC: 22 U/L (ref 1–32)
BACTERIA UR QL AUTO: ABNORMAL /HPF
BASOPHILS # BLD AUTO: 0.06 10*3/MM3 (ref 0–0.2)
BASOPHILS NFR BLD AUTO: 0.7 % (ref 0–1.5)
BILIRUB SERPL-MCNC: 0.3 MG/DL (ref 0–1.2)
BILIRUB UR QL STRIP: NEGATIVE
BUN SERPL-MCNC: 17 MG/DL (ref 8–23)
BUN/CREAT SERPL: 13.4 (ref 7–25)
CALCIUM SPEC-SCNC: 9.3 MG/DL (ref 8.6–10.5)
CHLORIDE SERPL-SCNC: 105 MMOL/L (ref 98–107)
CLARITY UR: ABNORMAL
CO2 SERPL-SCNC: 26.2 MMOL/L (ref 22–29)
COLOR UR: ABNORMAL
CREAT SERPL-MCNC: 1.27 MG/DL (ref 0.57–1)
DEPRECATED RDW RBC AUTO: 44.1 FL (ref 37–54)
EGFRCR SERPLBLD CKD-EPI 2021: 45 ML/MIN/1.73
EOSINOPHIL # BLD AUTO: 0.14 10*3/MM3 (ref 0–0.4)
EOSINOPHIL NFR BLD AUTO: 1.7 % (ref 0.3–6.2)
ERYTHROCYTE [DISTWIDTH] IN BLOOD BY AUTOMATED COUNT: 12.9 % (ref 12.3–15.4)
GLOBULIN UR ELPH-MCNC: 2.2 GM/DL
GLUCOSE SERPL-MCNC: 119 MG/DL (ref 65–99)
GLUCOSE UR STRIP-MCNC: NEGATIVE MG/DL
HCT VFR BLD AUTO: 37.9 % (ref 34–46.6)
HGB BLD-MCNC: 12.4 G/DL (ref 12–15.9)
HGB UR QL STRIP.AUTO: NEGATIVE
HOLD SPECIMEN: NORMAL
HOLD SPECIMEN: NORMAL
HYALINE CASTS UR QL AUTO: ABNORMAL /LPF
IMM GRANULOCYTES # BLD AUTO: 0.02 10*3/MM3 (ref 0–0.05)
IMM GRANULOCYTES NFR BLD AUTO: 0.2 % (ref 0–0.5)
KETONES UR QL STRIP: ABNORMAL
LEUKOCYTE ESTERASE UR QL STRIP.AUTO: ABNORMAL
LYMPHOCYTES # BLD AUTO: 3.28 10*3/MM3 (ref 0.7–3.1)
LYMPHOCYTES NFR BLD AUTO: 39.9 % (ref 19.6–45.3)
MCH RBC QN AUTO: 30.5 PG (ref 26.6–33)
MCHC RBC AUTO-ENTMCNC: 32.7 G/DL (ref 31.5–35.7)
MCV RBC AUTO: 93.3 FL (ref 79–97)
MONOCYTES # BLD AUTO: 0.67 10*3/MM3 (ref 0.1–0.9)
MONOCYTES NFR BLD AUTO: 8.1 % (ref 5–12)
NEUTROPHILS NFR BLD AUTO: 4.06 10*3/MM3 (ref 1.7–7)
NEUTROPHILS NFR BLD AUTO: 49.4 % (ref 42.7–76)
NITRITE UR QL STRIP: POSITIVE
NRBC BLD AUTO-RTO: 0 /100 WBC (ref 0–0.2)
PH UR STRIP.AUTO: 6.5 [PH] (ref 5–8)
PLATELET # BLD AUTO: 260 10*3/MM3 (ref 140–450)
PMV BLD AUTO: 9.1 FL (ref 6–12)
POTASSIUM SERPL-SCNC: 4 MMOL/L (ref 3.5–5.2)
PROT SERPL-MCNC: 6.3 G/DL (ref 6–8.5)
PROT UR QL STRIP: ABNORMAL
QT INTERVAL: 416 MS
QTC INTERVAL: 433 MS
RBC # BLD AUTO: 4.06 10*6/MM3 (ref 3.77–5.28)
RBC # UR STRIP: ABNORMAL /HPF
REF LAB TEST METHOD: ABNORMAL
SODIUM SERPL-SCNC: 141 MMOL/L (ref 136–145)
SP GR UR STRIP: 1.02 (ref 1–1.03)
SQUAMOUS #/AREA URNS HPF: ABNORMAL /HPF
UROBILINOGEN UR QL STRIP: ABNORMAL
WBC # UR STRIP: ABNORMAL /HPF
WBC NRBC COR # BLD AUTO: 8.23 10*3/MM3 (ref 3.4–10.8)
WHOLE BLOOD HOLD COAG: NORMAL
WHOLE BLOOD HOLD SPECIMEN: NORMAL

## 2024-08-01 PROCEDURE — 99283 EMERGENCY DEPT VISIT LOW MDM: CPT

## 2024-08-01 PROCEDURE — 85025 COMPLETE CBC W/AUTO DIFF WBC: CPT | Performed by: EMERGENCY MEDICINE

## 2024-08-01 PROCEDURE — 81001 URINALYSIS AUTO W/SCOPE: CPT | Performed by: EMERGENCY MEDICINE

## 2024-08-01 PROCEDURE — 80053 COMPREHEN METABOLIC PANEL: CPT | Performed by: EMERGENCY MEDICINE

## 2024-08-01 PROCEDURE — 25810000003 SODIUM CHLORIDE 0.9 % SOLUTION: Performed by: EMERGENCY MEDICINE

## 2024-08-01 PROCEDURE — 93005 ELECTROCARDIOGRAM TRACING: CPT | Performed by: EMERGENCY MEDICINE

## 2024-08-01 PROCEDURE — 93010 ELECTROCARDIOGRAM REPORT: CPT | Performed by: INTERNAL MEDICINE

## 2024-08-01 RX ORDER — CEPHALEXIN 500 MG/1
500 CAPSULE ORAL 3 TIMES DAILY
Qty: 21 CAPSULE | Refills: 0 | Status: SHIPPED | OUTPATIENT
Start: 2024-08-01

## 2024-08-01 RX ORDER — SODIUM CHLORIDE 0.9 % (FLUSH) 0.9 %
10 SYRINGE (ML) INJECTION AS NEEDED
Status: DISCONTINUED | OUTPATIENT
Start: 2024-08-01 | End: 2024-08-01 | Stop reason: HOSPADM

## 2024-08-01 RX ADMIN — SODIUM CHLORIDE 500 ML: 9 INJECTION, SOLUTION INTRAVENOUS at 14:39

## 2024-08-01 NOTE — DISCHARGE INSTRUCTIONS
Please do your best to drink more fluids to try to prevent dehydration.    Urinalysis was abnormal suggestive of urinary tract infection which should be well treated with oral antibiotics.  Do not hesitate return for fever, worsening symptoms or as needed

## 2024-08-01 NOTE — ED TRIAGE NOTES
Pt was brought in by family for dizziness.  states that they were shopping and she became weak/dizzy a couple times and had to sit down.  states that this has been going on for a couple of days and has had recent changes to meds

## 2024-08-01 NOTE — ED PROVIDER NOTES
EMERGENCY DEPARTMENT ENCOUNTER  Room Number:  17/17  PCP: Sindhu Sanches MD  Independent Historians: Patient,  at bedside      HPI:  Chief Complaint: had concerns including Dizziness and Weakness - Generalized.     A complete HPI/ROS/PMH/PSH/SH/FH are unobtainable due to:   Chronic or social conditions impacting patient care (Social Determinants of Health):       Context: Chely Marie is a 73 y.o. female with a medical history of dementia, chronic neck pain who presents to the ED c/o acute generalized weakness.   states patient has had poor appetite and not drinking much fluids over the last several weeks.  He feels it has been related to recent medication changes.  She is on both Aricept and Namenda and they did decrease her Aricept secondary to poor appetite.  Did not change the Namenda.  She had been on Prozac which they increased from 20-40 and then stopped and switched to Lexapro about 10 days ago.  Patient has chronic neck pain and has had ablations in the past.  She was shopping today at [employer] and got weaker than normal so  want to get her checked out.  Patient currently denies any complaints.  She denies headache, chest pain or abdominal pain.  She does report poor appetite and has lost approximately 10 pounds recently.  She does have chronic neck pain and takes occasional Norco and Skelaxin.  She has not taken either of these today.  She has had a neck ablation and is pending another 1 in the near future.      Review of prior external notes (non-ED) -and- Review of prior external test results outside of this encounter:   I reviewed prior medical records including most [facility] neurolog[facility].  Patient has history of mild cognitive disorder and is on Namenda and Aricept.      Prescription drug monitoring program review:         PAST MEDICAL HISTORY  Active Ambulatory Problems     Diagnosis Date Noted    Age-related osteoporosis with current  pathological fracture with routine healing 02/22/2019    Cervical pain (neck) 06/29/2021    Degeneration of cervical intervertebral disc 06/29/2021    History of fusion of cervical spine 2006    Arthropathy of cervical facet joint 04/11/2023    Chronic neck pain 07/19/2023     Resolved Ambulatory Problems     Diagnosis Date Noted    No Resolved Ambulatory Problems     Past Medical History:   Diagnosis Date    Allergies     Ankle fracture     Clostridioides difficile infection     DDD (degenerative disc disease), lumbar     Neck pain     Osteoporosis     PONV (postoperative nausea and vomiting)     Varicose vein          PAST SURGICAL HISTORY  Past Surgical History:   Procedure Laterality Date    ANKLE OPEN REDUCTION INTERNAL FIXATION Left 10/29/2018    Procedure: LT ANKLE OPEN REDUCTION INTERNAL FIXATION;  Surgeon: Enmanuel Camarena MD;  [place]  [facility] [place] [facility] [place]  Service: [place]opedics    ANTERIOR CERVICAL FUSION Right 2006    C5-6; Dr. Ward    BLADDER SURGERY      COLONOSCOPY N/A 10/07/2020    Procedure: COLONOSCOPY INTO CECUM WITH COLD BX POLYPECTOMY;  Surgeon: Da Serrano MD;  Location: [facility] ENDOSCOPY;  Service: Gastroenterology;  Laterality: N/A;  PRE:  SCREENING  POST:  POLYP    EPIDURAL BLOCK      FRACTURE SURGERY      Ankle surgery    HYSTERECTOMY      MEDIAL BRANCH BLOCK Bilateral 04/12/2023    Procedure: CERVICAL/THORACIC MEDIAL BRANCH BLOCK bilateral C4-C6 12966 [id number];  Surgeon: Aniyah Wick MD;  [place] [facility] [place];  Service: Pain Management;  Laterality: Bilateral;    MEDIAL BRANCH BLOCK Bilateral 5/3/2023    Procedure: CERVICAL/THORACIC MEDIAL BRANCH BLOCK #2 (4/24/2023) bilateral C4-C6  [id number];  Surgeon: Aniyah Wick MD;  [place] [facility] [place];  Service: Pain Management;  Laterality: Bilateral;    NECK SURGERY  2005    RADIOFREQUENCY ABLATION Bilateral 5/24/2023    Procedure: RADIOFREQUENCY ABLATION NERVES Bilateral C4-C6 49080, 03218;  Surgeon: Aniyah Wick MD;   [place] [facility] [place];  Service: Pain Management;  Laterality: Bilateral;    RADIOFREQUENCY ABLATION Bilateral 3/4/2024    Procedure: RADIOFREQUENCY ABLATION CERVICAL. bilateral C4-C6.  44126, 62434;  Surgeon: Aniyah Wick MD;  Location: [facility] [place];  Service: Pain Management;  Laterality: Bilateral;    SPINAL FUSION  2005    TONSILLECTOMY      VEIN SURGERY           FAMILY HISTORY  Family History   Problem Relation Age of Onset    Alzheimer's disease Other     No Known Problems Mother     No Known Problems Father     Malig Hyperthermia Neg Hx          SOCIAL HISTORY  Social History     Socioeconomic History    Marital status:    Tobacco Use    Smoking status: Never    Smokeless tobacco: Never   Substance and Sexual Activity    Alcohol use: Yes     Alcohol/week: 1.0 standard drink of alcohol     Types: 1 Drinks containing 0.5 oz of alcohol per week     Comment: Social    Drug use: No    Sexual activity: Not Currently     Partners: Male     Birth control/protection: Hysterectomy         ALLERGIES  Amoxicillin and Molds & smuts      REVIEW OF SYSTEMS  Review of Systems   Constitutional:  Positive for appetite change, fatigue and unexpected weight change. Negative for fever.   Respiratory:  Negative for shortness of breath.    Cardiovascular:  Negative for chest pain.   Musculoskeletal:  Positive for neck pain (Chronic neck pain).   Neurological:  Positive for weakness.   All other systems reviewed and are negative.    Included in HPI  All systems reviewed and negative except for those discussed in HPI.      PHYSICAL EXAM    I have reviewed the triage vital signs and nursing notes.    ED Triage Vitals   Temp Heart Rate Resp BP SpO2   08/01/24 1359 08/01/24 1359 08/01/24 1359 08/01/24 1401 08/01/24 1359   98.5 °F (36.9 °C) 101 16 107/70 96 %      Temp src Heart Rate Source Patient Position BP Location FiO2 (%)   08/01/24 1359 08/01/24 1359 08/01/24 1401 08/01/24 1401 --   Tympanic Monitor Sitting Right  arm        Physical Exam  GENERAL: Alert pleasant female no obvious distress.  Triage vitals notable for mild tachycardia with pulse of 101.  Blood pressure, temperature and O2 sats are benign as listed above  SKIN: Warm, dry  HENT: Normocephalic, atraumatic  EYES: no scleral icterus  CV: regular rhythm, regular rate-no murmur  RESPIRATORY: normal effort, lungs clear-O2 sats upper 90s room air  ABDOMEN: soft, nontender, nondistended  MUSCULOSKELETAL: no deformity  NEURO: alert, moves all extremities, follows commands  Strength-mild generalized weakness without clear focal deficit  Sensation and coordination are grossly intact  Mentation-patient can answer simple questions and follow simple commands without difficulty      LAB RESULTS  Recent Results (from the past 24 hour(s))   ECG 12 Lead Other; Generalized weakness    Collection Time: 08/01/24  2:36 PM   Result Value Ref Range    QT Interval 416 ms    QTC Interval 433 ms   Comprehensive Metabolic Panel    Collection Time: 08/01/24  2:38 PM    Specimen: Blood   Result Value Ref Range    Glucose 119 (H) 65 - 99 mg/dL    BUN 17 8 - 23 mg/dL    Creatinine 1.27 (H) 0.57 - 1.00 mg/dL    Sodium 141 136 - 145 mmol/L    Potassium 4.0 3.5 - 5.2 mmol/L    Chloride 105 98 - 107 mmol/L    CO2 26.2 22.0 - 29.0 mmol/L    Calcium 9.3 8.6 - 10.5 mg/dL    Total Protein 6.3 6.0 - 8.5 g/dL    Albumin 4.1 3.5 - 5.2 g/dL    ALT (SGPT) 12 1 - 33 U/L    AST (SGOT) 22 1 - 32 U/L    Alkaline Phosphatase 43 39 - 117 U/L    Total Bilirubin 0.3 0.0 - 1.2 mg/dL    Globulin 2.2 gm/dL    A/G Ratio 1.9 g/dL    BUN/Creatinine Ratio 13.4 7.0 - 25.0    Anion Gap 9.8 5.0 - 15.0 mmol/L    eGFR 45.0 (L) >60.0 mL/min/1.73   CBC Auto Differential    Collection Time: 08/01/24  2:38 PM    Specimen: Blood   Result Value Ref Range    WBC 8.23 3.40 - 10.80 10*3/mm3    RBC 4.06 3.77 - 5.28 10*6/mm3    Hemoglobin 12.4 12.0 - 15.9 g/dL    Hematocrit 37.9 34.0 - 46.6 %    MCV 93.3 79.0 - 97.0 fL    MCH 30.5 26.6  - 33.0 pg    MCHC 32.7 31.5 - 35.7 g/dL    RDW 12.9 12.3 - 15.4 %    RDW-SD 44.1 37.0 - 54.0 fl    MPV 9.1 6.0 - 12.0 fL    Platelets 260 140 - 450 10*3/mm3    Neutrophil % 49.4 42.7 - 76.0 %    Lymphocyte % 39.9 19.6 - 45.3 %    Monocyte % 8.1 5.0 - 12.0 %    Eosinophil % 1.7 0.3 - 6.2 %    Basophil % 0.7 0.0 - 1.5 %    Immature Grans % 0.2 0.0 - 0.5 %    Neutrophils, Absolute 4.06 1.70 - 7.00 10*3/mm3    Lymphocytes, Absolute 3.28 (H) 0.70 - 3.10 10*3/mm3    Monocytes, Absolute 0.67 0.10 - 0.90 10*3/mm3    Eosinophils, Absolute 0.14 0.00 - 0.40 10*3/mm3    Basophils, Absolute 0.06 0.00 - 0.20 10*3/mm3    Immature Grans, Absolute 0.02 0.00 - 0.05 10*3/mm3    nRBC 0.0 0.0 - 0.2 /100 WBC   Green Top (Gel)    Collection Time: 08/01/24  2:38 PM   Result Value Ref Range    Extra Tube Hold for add-ons.    Lavender Top    Collection Time: 08/01/24  2:38 PM   Result Value Ref Range    Extra Tube hold for add-on    Gold Top - SST    Collection Time: 08/01/24  2:38 PM   Result Value Ref Range    Extra Tube Hold for add-ons.    Light Blue Top    Collection Time: 08/01/24  2:38 PM   Result Value Ref Range    Extra Tube Hold for add-ons.    Urinalysis With Microscopic If Indicated (No Culture) - Urine, Clean Catch    Collection Time: 08/01/24  3:28 PM    Specimen: Urine, Clean Catch   Result Value Ref Range    Color, UA Dark Yellow (A) Yellow, Straw    Appearance, UA Cloudy (A) Clear    pH, UA 6.5 5.0 - 8.0    Specific Gravity, UA 1.019 1.005 - 1.030    Glucose, UA Negative Negative    Ketones, UA Trace (A) Negative    Bilirubin, UA Negative Negative    Blood, UA Negative Negative    Protein, UA Trace (A) Negative    Leuk Esterase, UA Large (3+) (A) Negative    Nitrite, UA Positive (A) Negative    Urobilinogen, UA 1.0 E.U./dL 0.2 - 1.0 E.U./dL   Urinalysis, Microscopic Only - Urine, Clean Catch    Collection Time: 08/01/24  3:28 PM    Specimen: Urine, Clean Catch   Result Value Ref Range    RBC, UA 0-2 None Seen, 0-2 /HPF     WBC, UA Too Numerous to Count (A) None Seen, 0-2 /HPF    Bacteria, UA 4+ (A) None Seen /HPF    Squamous Epithelial Cells, UA 0-2 None Seen, 0-2 /HPF    Hyaline Casts, UA 7-12 None Seen /LPF    Methodology Automated Microscopy          RADIOLOGY  No Radiology Exams Resulted Within Past 24 Hours      MEDICATIONS GIVEN IN ER  Medications   sodium chloride 0.9 % flush 10 mL (has no administration in time range)   sodium chloride 0.9 % bolus 500 mL (0 mL Intravenous Stopped 8/1/24 1527)         ORDERS PLACED DURING THIS VISIT:  Orders Placed This Encounter   Procedures    Comprehensive Metabolic Panel    Urinalysis With Microscopic If Indicated (No Culture) - Urine, Clean Catch    CBC Auto Differential    [place] Draw    Urinalysis, Microscopic Only - Urine, Clean Catch    ECG 12 Lead Other; Generalized weakness    Insert Peripheral IV    CBC & Differential    Green Top (Gel)    Lavender Top    Gold Top - SST    Light Blue Top         OUTPATIENT MEDICATION MANAGEMENT:  Current Facility-Administered Medications Ordered in Epic   Medication Dose Route Frequency Provider Last Rate Last Admin    sodium chloride 0.9 % flush 10 mL  10 mL Intravenous PRN Lloyd Alonso MD         Current Outpatient Medications Ordered in Epic   Medication Sig Dispense Refill    cephalexin (KEFLEX) 500 MG capsule Take 1 capsule by mouth 3 (Three) Times a Day. 21 capsule 0    cetirizine (zyrTEC) 10 MG tablet Take 1 tablet by mouth Daily.      Cholecalciferol (Vitamin D3) 50 MCG (2000 UT) tablet Take  by mouth Daily.      colesevelam (WELCHOL) 625 MG tablet Take 1 tablet by mouth Every 12 (Twelve) Hours.      donepezil (ARICEPT) 10 MG tablet Take 1 tablet by mouth Every Night.      FLUoxetine (PROzac) 20 MG capsule TAKE 1 CAPSULE BY MOUTH 1 TIME EACH DAY.      fluticasone (FLONASE) 50 MCG/ACT nasal spray 1 spray into the nostril(s) as directed by provider Daily.      HYDROcodone-acetaminophen (NORCO) 5-325 MG per tablet Take 1 tablet by mouth  See Admin Instructions. Take 1 tablet by mouth every 4-6 hours as needed for pain      memantine (NAMENDA) 10 MG tablet Take 1 tablet by mouth Every 12 (Twelve) Hours.      metaxalone (SKELAXIN) 800 MG tablet Take 1 tablet by mouth Every 8 (Eight) Hours As Needed.      methylPREDNISolone (MEDROL) 4 MG dose pack Take as directed on package instructions. 21 tablet 0    Multiple Vitamins-Minerals (CENTRUM SILVER ADULT 50+) tablet Take 1 tablet by mouth Daily.      Probiotic Product (PROBIOTIC DAILY PO) Take  by mouth Daily.      promethazine (PHENERGAN) 25 MG tablet Take 1 tablet by mouth Every 6 (Six) Hours As Needed.      vitamin B-12 (CYANOCOBALAMIN) 1000 MCG tablet Take 1 tablet by mouth Daily.           PROCEDURES  Procedures            PROGRESS, DATA ANALYSIS, CONSULTS, AND MEDICAL DECISION MAKING  All labs have been independently interpreted by me.  All radiology studies have been reviewed by me. All EKG's have been independently viewed and interpreted by me.  Discussion below represents my analysis of pertinent findings related to patient's condition, differential diagnosis, treatment plan and final disposition.    Differential diagnosis includes but is not limited to dehydration, electrolyte disturbance, chronic dementia, chronic neck pain, medication reaction.      ED Course as of 08/01/24 1616   u Aug 01, 2024   1452 EKG independently interpreted by me    Time 2:36 PM  Sinus 65 with occasional PAC  Normal P waves and KS intervals  QRS-left axis deviation, unremarkable QRS  ST, T waves-nonspecific change  Not significant change compared to 1/2014 [DB]   1515 Chemistries notable for elevated serum creatinine of 1.27 consistent with mild dehydration.  Prior values have been normal in the past and this would suggest some degree of dehydration. [DB]   1515 Discussed results of labs with patient including elevated creatinine suggesting some dehydration.    Patient is feeling better after IV fluids and did eat  and drink some at bedside.    Would like to get a urinalysis to help rule out urinary tract infection but think patient will likely be discharged home. [DB]   1613 Urinalysis does suggest urinary tract infection.  As patient is afebrile and well-appearing with normal white count I think this can be treated with oral antibiotics as an outpatient. [DB]      ED Course User Index  [DB] Lloyd Alonso MD             AS OF 16:16 EDT VITALS:    BP - 129/79  HR - 61  TEMP - 98.5 °F (36.9 °C) (Tympanic)  O2 SATS - 96%    COMPLEXITY OF CARE  73-year-old female with history of cognitive impairment presents with generalized weakness and concerns for dehydration, poor appetite.    Please see ED course above for my independent evaluation or potation of ED testing which are notable for elevated serum creatinine of 1.27 suggestive of dehydration.  Also urinalysis was abnormal suggesting acute urinary tract infection.    Patient did improve after IV fluids and IV antiemetics.  Patient and  comfortable with outpatient treatment.      DIAGNOSIS  Final diagnoses:   Generalized weakness   Mild dehydration   Chronic neck pain   Urinary tract infection without hematuria, site unspecified         DISPOSITION  ED Disposition       ED Disposition   Discharge    Condition   Stable    Comment   --                Please note that portions of this document were completed with a voice recognition program.    Note Disclaimer: At [facility], we believe that sharing information builds trust and better relationships. You are receiving this note because you recently visited [facility]. It is possible you will see health information before a provider has talked with you about it. This kind of information can be easy to misunderstand. To help you fully understand what it means for your health, we urge you to discuss this note with your provider.         Lloyd Alonso MD  08/01/24 8212

## 2024-08-23 ENCOUNTER — OFFICE VISIT (OUTPATIENT)
Dept: PAIN MEDICINE | Facility: CLINIC | Age: 74
End: 2024-08-23
Payer: MEDICARE

## 2024-08-23 VITALS
OXYGEN SATURATION: 96 % | WEIGHT: 131 LBS | SYSTOLIC BLOOD PRESSURE: 94 MMHG | HEIGHT: 66 IN | TEMPERATURE: 97.1 F | BODY MASS INDEX: 21.05 KG/M2 | HEART RATE: 79 BPM | DIASTOLIC BLOOD PRESSURE: 61 MMHG

## 2024-08-23 DIAGNOSIS — M50.30 DEGENERATION OF CERVICAL INTERVERTEBRAL DISC: ICD-10-CM

## 2024-08-23 DIAGNOSIS — M47.812 ARTHROPATHY OF CERVICAL FACET JOINT: Primary | ICD-10-CM

## 2024-08-23 PROCEDURE — 99214 OFFICE O/P EST MOD 30 MIN: CPT | Performed by: NURSE PRACTITIONER

## 2024-08-23 PROCEDURE — 1125F AMNT PAIN NOTED PAIN PRSNT: CPT | Performed by: NURSE PRACTITIONER

## 2024-08-23 PROCEDURE — 1159F MED LIST DOCD IN RCRD: CPT | Performed by: NURSE PRACTITIONER

## 2024-08-23 PROCEDURE — 1160F RVW MEDS BY RX/DR IN RCRD: CPT | Performed by: NURSE PRACTITIONER

## 2024-08-23 NOTE — H&P (VIEW-ONLY)
CHIEF COMPLAINT  F/U neck pain    Subjective   Chely Marie is a 74 y.o. female  who presents for follow-up.  She has a history of chronic neck pain.      Procedures ---  Bilateral C4-C6 RFA on 3/24/2024 --- 100% relief x 4 months, 50% relief another couple of months, waning of relief noted today  bilateral C4-C6 RFA  on 5/24/2023 --- 100% relief from the procedure x 7 months.      Pain averaging 6/10 VAS.   Utilizes Metaxalone 800 mg a few times a month, Hydrocodone 5/325 half tablet sparingly as well (once or twice a month). PCP has been managing medications. Tylenol is utilized first.      Neck Pain   This is a chronic problem. The current episode started more than 1 year ago. The problem occurs daily. The problem has been waxing and waning. The pain is present in the left side and right side. The quality of the pain is described as burning and aching. The pain is at a severity of 1/10. The pain is moderate. The symptoms are aggravated by position. Pertinent negatives include no chest pain, fever, headaches, numbness or weakness. She has tried NSAIDs, oral narcotics, home exercises, muscle relaxants and chiropractic manipulation for the symptoms. The treatment provided significant (RFA) relief.        PEG Assessment   What number best describes your pain on average in the past week?6  What number best describes how, during the past week, pain has interfered with your enjoyment of life?2  What number best describes how, during the past week, pain has interfered with your general activity?  6    Review of Pertinent Medical Data ---      The following portions of the patient's history were reviewed and updated as appropriate: allergies, current medications, past family history, past medical history, past social history, past surgical history, and problem list.    Review of Systems   Constitutional:  Negative for activity change, chills, fatigue and fever.   HENT:  Negative for congestion.    Eyes:  Negative  "for visual disturbance.   Respiratory:  Negative for chest tightness and shortness of breath.    Cardiovascular:  Negative for chest pain.   Gastrointestinal:  Negative for abdominal pain, constipation and diarrhea.   Genitourinary:  Negative for difficulty urinating, dyspareunia and dysuria.   Musculoskeletal:  Positive for neck pain.   Neurological:  Positive for dizziness (r/t medications) and light-headedness (r/t medications). Negative for weakness, numbness and headaches.   Psychiatric/Behavioral:  Negative for agitation and sleep disturbance. The patient is not nervous/anxious.      I have reviewed and confirmed the accuracy of the ROS as documented by the MA/LPN/RN SUSHMA Sellers    Vitals:    08/23/24 1109   BP: 94/61   Pulse: 79   Temp: 97.1 °F (36.2 °C)   SpO2: 96%   Weight: 59.4 kg (131 lb)   Height: 167.6 cm (66\")   PainSc:   1   PainLoc: Neck         Objective   Physical Exam  Vitals and nursing note reviewed.   Constitutional:       General: She is not in acute distress.     Appearance: Normal appearance. She is not ill-appearing.   Pulmonary:      Effort: Pulmonary effort is normal. No respiratory distress.   Musculoskeletal:      Cervical back: Tenderness and bony tenderness present.      Comments: +cervical facet tenderness/loading    Neurological:      Mental Status: She is alert and oriented to person, place, and time.      Motor: Motor function is intact. No weakness.      Gait: Gait normal.   Psychiatric:         Mood and Affect: Mood normal.         Behavior: Behavior normal.         Assessment & Plan   Diagnoses and all orders for this visit:    1. Arthropathy of cervical facet joint (Primary)    2. Degeneration of cervical intervertebral disc        --- Bilateral C4-C6 RFA    Thermal Radiofrequency Destruction    This repeat thermal radiofrequency destruction (RFA) of cervical, thoracic, or lumbar paravertebral facet joint (medial branch) nerves at the same anatomical site is " considered medically reasonable and necessary as this patient has met the criteria of having a minimum of consistent 50% improvement in pain for at least six (6) months or at least 50% consistent improvement in the ability to perform previously painful movements and ADLs as compared to baseline measurement using the same scale.      Chely Marie reports a pain score of 1.  Given her pain assessment as noted, treatment options were discussed and the following options were decided upon as a follow-up plan to address the patient's pain:  RFA .      --- Follow-up for procedure

## 2024-09-04 RX ORDER — MIDAZOLAM HYDROCHLORIDE 1 MG/ML
1 INJECTION INTRAMUSCULAR; INTRAVENOUS ONCE
Status: COMPLETED | OUTPATIENT
Start: 2024-09-04 | End: 2024-09-06

## 2024-09-04 RX ORDER — FENTANYL CITRATE 50 UG/ML
50 INJECTION, SOLUTION INTRAMUSCULAR; INTRAVENOUS ONCE
Status: COMPLETED | OUTPATIENT
Start: 2024-09-04 | End: 2024-09-06

## 2024-09-04 NOTE — DISCHARGE INSTRUCTIONS
[facility] - Post-procedure Instructions          --  While there are no absolute restrictions, it is recommended that you do not perform strenuous activity today. In the morning, you may resume your level of activity as before your block.    --  If you have a band-aid at your injection site, please remove it later today. Observe the area for any redness, swelling, pus-like drainage, or a temperature over 101°. If any of these symptoms occur, please call your doctor at [phone]. If after office hours, leave a message and the on-call provider will return your call.    --  Ice may be applied to your injection site. It is recommended you avoid direct heat (heating pad; hot tub) for 1-2 days.    --  Call [facility] at [phone] if you experience persistent headache, persistent bleeding from the injection site, or severe pain not relieved by heat or oral medication.    --  Do not make important decisions today.    --  Due to the effects of the block and/or the I.V. Sedation, DO NOT drive or operate hazardous machinery for 12 hours.  Local anesthetics may cause numbness after procedure and precautions must be taken with regards to operating equipment as well as with walking, even if ambulating with assistance of another person or with an assistive device.    --  Do not drink alcohol for 12 hours.    -- You may return to work tomorrow, or as directed by your referring doctor.    --  Occasionally you may notice a slight increase in your pain after the procedure. This should start to improve within the next 24-48 hours. Radiofrequency ablation procedure pain may last 3-4 weeks.    --  It may take as long as 3-4 days before you notice a gradual improvement in your pain and/or other symptoms.    -- You may continue to take your prescribed pain medication as needed.    --  Some normal possible side effects of steroid use could include fluid retention, increased blood sugar, dull headache,  "increased sweating, increased appetite, mood swings and flushing.    --  Diabetics are recommended to watch their blood glucose level closely for 24-48 hours after the injection.    --  Must stay in PACU for 20 min upon arrival and prove no leg weakness before being discharged.    --  IN THE EVENT OF A LIFE THREATENING EMERGENCY, (CHEST PAIN, BREATHING DIFFICULTIES, PARALYSIS…) YOU SHOULD GO TO YOUR NEAREST EMERGENCY ROOM.    --  You should be contacted by our office within 2-3 days to schedule follow up or next appointment date.  If not contacted within 7 days, please call the office at [phone]     Radiofrequency ablation (RFA):     - Radiofrequency ablation is a term used to describe cauterization or \"burning.\"   - In pain management, we can use this technique with a special needle to target and destroy areas that are causing your pain.   - In most cases, you must have TWO successful \"test injections\" before you are a candidate for RFA.    After your RFA:   - Because heat is used in this technique, it is common to have soreness after the procedure.  Sometimes \"neuritis\" occurs, which feels like tingling, prickly, or sunburn under the skin sensations.   - Ice packs are helpful in decreasing this soreness and preventing post-procedure \"neuritis\" pain.  Use an ice pack for 20 minutes at a time at least 3 times the day of and the day after your procedure.   - It is common to have arm/leg numbness or weakness the day of your procedure, but this should wear off by the following day.   - It may take up to 6 weeks to gain full benefit from this procedure.   "

## 2024-09-05 RX ORDER — ESCITALOPRAM OXALATE 10 MG/1
10 TABLET ORAL DAILY
COMMUNITY

## 2024-09-06 ENCOUNTER — HOSPITAL ENCOUNTER (OUTPATIENT)
Dept: GENERAL RADIOLOGY | Facility: SURGERY CENTER | Age: 74
End: 2024-09-06
Payer: MEDICARE

## 2024-09-06 ENCOUNTER — HOSPITAL ENCOUNTER (OUTPATIENT)
Facility: SURGERY CENTER | Age: 74
Setting detail: HOSPITAL OUTPATIENT SURGERY
Discharge: HOME | End: 2024-09-06
Attending: ANESTHESIOLOGY | Admitting: ANESTHESIOLOGY
Payer: MEDICARE

## 2024-09-06 VITALS
SYSTOLIC BLOOD PRESSURE: 149 MMHG | TEMPERATURE: 98.4 F | RESPIRATION RATE: 16 BRPM | OXYGEN SATURATION: 96 % | HEIGHT: 66 IN | HEART RATE: 91 BPM | BODY MASS INDEX: 21.05 KG/M2 | WEIGHT: 131 LBS | DIASTOLIC BLOOD PRESSURE: 81 MMHG

## 2024-09-06 DIAGNOSIS — M47.812 ARTHROPATHY OF CERVICAL FACET JOINT: ICD-10-CM

## 2024-09-06 DIAGNOSIS — Z41.9 SURGERY, ELECTIVE: ICD-10-CM

## 2024-09-06 PROCEDURE — 64634 DESTROY C/TH FACET JNT ADDL: CPT | Performed by: ANESTHESIOLOGY

## 2024-09-06 PROCEDURE — 64633 DESTROY CERV/THOR FACET JNT: CPT | Performed by: ANESTHESIOLOGY

## 2024-09-06 PROCEDURE — 25010000002 FENTANYL CITRATE (PF) 50 MCG/ML SOLUTION: Performed by: ANESTHESIOLOGY

## 2024-09-06 PROCEDURE — 76000 FLUOROSCOPY <1 HR PHYS/QHP: CPT

## 2024-09-06 PROCEDURE — 25010000002 BUPIVACAINE (PF) 0.25 % SOLUTION 10 ML VIAL: Performed by: ANESTHESIOLOGY

## 2024-09-06 PROCEDURE — 25010000002 MIDAZOLAM PER 1MG: Performed by: ANESTHESIOLOGY

## 2024-09-06 RX ORDER — SODIUM CHLORIDE 0.9 % (FLUSH) 0.9 %
10 SYRINGE (ML) INJECTION EVERY 12 HOURS SCHEDULED
Status: DISCONTINUED | OUTPATIENT
Start: 2024-09-06 | End: 2024-09-06 | Stop reason: HOSPADM

## 2024-09-06 RX ORDER — SODIUM CHLORIDE 0.9 % (FLUSH) 0.9 %
10 SYRINGE (ML) INJECTION AS NEEDED
Status: DISCONTINUED | OUTPATIENT
Start: 2024-09-06 | End: 2024-09-06 | Stop reason: HOSPADM

## 2024-09-06 RX ADMIN — FENTANYL CITRATE 50 MCG: 50 INJECTION INTRAMUSCULAR; INTRAVENOUS at 10:58

## 2024-09-06 RX ADMIN — MIDAZOLAM HYDROCHLORIDE 1 MG: 2 INJECTION, SOLUTION INTRAMUSCULAR; INTRAVENOUS at 10:58

## 2024-09-06 NOTE — OP NOTE
BILATERAL Cervical Medial Branch Radiofrequency Ablation  [facility]      PREOPERATIVE DIAGNOSIS:  Cervical spondylosis without myelopathy   POSTOPERATIVE DIAGNOSIS:  Same as preoperative diagnosis    PROCEDURE:    Cervical Facet Nerve (medial branch) Radiofrequency Ablation, with Fluoroscopy:  LEVELS C4, C5, and C6  17014 - 50 Cervical Medial Branch Ablation, 1st level  15211 - 50 Cervical Medial Branch Ablation, 2nd level    PRE-PROCEDURE DISCUSSION WITH PATIENT:    Risks and complications were discussed with the patient prior to starting the procedure and informed consent was obtained.    SURGEON:  Aniyah Wick MD    REASON FOR PROCEDURE:    The patient complains of pain that seems to have a significant axial component Previous clinically significant therapeutic effect after prior Radiofrequency procedure    Time of the procedure with sedation: 1432-3003 minutes    SEDATION:  Anxiolysis was used for this procedure which included Versed 1mg & Fentanyl 50mcg  ANESTHETIC:   2% Lidocaine and 0.25% Bupivacaine  STEROID:  None  TOTAL VOLUME OF SOLUTION:  6mL    DESCRIPTON OF PROCEDURE:  After obtaining informed consent, the patient was placed in the prone position. IV access was obtained.  Heart rate, blood pressure, and pulse oximeter were monitored and all sedation was administered by an RN under my direct guidance.  The cervical area was prepped with Chloraprep and draped with sterile barrier.     AP fluoroscopic image was used to visualize the waists of the articular pillars on the BILATERAL  side at C4, C5, and C6. The skin and subcutaneous tissue over the lateral edge of each pillar was anesthetized with 1% lidocaine. A 20-gauge 100mm RF Stanley needle was then advanced percutaneously through the anesthetized skin tracts under fluoroscopic guidance in a coaxial view to contact periosteum at each of these target sites.  Lateral fluoroscopy was then used to advance the needle tips to lie at  the midpoints of the articular pillars at each level.  All needle tips were radiographically confirmed to be posterior to the neural foramen.  Sensory stimulation was then performed with good stimulation of the neck at 0.5V or less at each level. Motor stimulation was performed up to 1.5V at each level producing stimulation of the multifidus muscles of the neck and no stimulation of the upper extremity at any level. There was also no change phonation during motor stimulation.  Each level was then anesthetized with 2% lidocaine prior to treatment with pulsed radiofrequency thermocoagulation at 42 degrees Celsius. Each level was then treated with thermal radiofrequency thermocoagulation at 80 degrees Celsius for 60 seconds in two separate cycles.  Prior to the removal of each needle, a volume of 1 mL of 0.25% bupivacaine was administered at each site.      Needles were removed intact from all levels.  Vitals were stable throughout.       ESTIMATED BLOOD LOSS:  minimal  SPECIMENS:  None    COMPLICATIONS:    No complications were noted.    TOLERANCE & DISCHARGE CONDITION:    The patient tolerated the procedure well.  The patient was transported to the recovery area without difficulties.  The patient was discharged to home under the care of family in stable and satisfactory condition.     PLAN OF CARE:  The patient was given our standard instruction sheet.  The patient will Return to clinic 6 wks.  The patient will resume all medications as per the medication reconciliation sheet.

## 2024-09-25 ENCOUNTER — TELEPHONE (OUTPATIENT)
Dept: GASTROENTEROLOGY | Facility: CLINIC | Age: 74
End: 2024-09-25

## 2024-09-25 ENCOUNTER — OFFICE VISIT (OUTPATIENT)
Dept: GASTROENTEROLOGY | Facility: CLINIC | Age: 74
End: 2024-09-25
Payer: MEDICARE

## 2024-09-25 VITALS
BODY MASS INDEX: 20.92 KG/M2 | WEIGHT: 133.3 LBS | HEIGHT: 67 IN | DIASTOLIC BLOOD PRESSURE: 64 MMHG | SYSTOLIC BLOOD PRESSURE: 110 MMHG | TEMPERATURE: 97.3 F | OXYGEN SATURATION: 99 % | HEART RATE: 70 BPM

## 2024-09-25 DIAGNOSIS — Z86.19 HISTORY OF CLOSTRIDIOIDES DIFFICILE INFECTION: ICD-10-CM

## 2024-09-25 DIAGNOSIS — Z86.0100 PERSONAL HISTORY OF COLONIC POLYPS: Primary | ICD-10-CM

## 2024-09-25 NOTE — TELEPHONE ENCOUNTER
Left voice mail stating the appt had been changed to 09/16 @ 10:30 with.  09/15 is a Monday and she does not work on Mondays

## 2024-09-25 NOTE — TELEPHONE ENCOUNTER
Hub staff attempted to follow warm transfer process and was unsuccessful     Caller: VELASQUEZ MONTGOMERY    Relationship to patient:     Best call back number: [phone]     Patient is needing: MS. MONTGOMERY WAS SEEN IN THE OFFICE THIS MORNING AND WHEN SCHEDULING HER APPT FOR 1 YEAR WAS SUPPOSE TO SCHEDULED WITH MARISOL ATKINS NOT IGOR MATHIS . PLEASE REVIEW AND ADVISE.    OK TO CALL BACK ANYTIME. OK TO LEAVE VM.

## 2024-10-18 ENCOUNTER — PREP FOR SURGERY (OUTPATIENT)
Dept: SURGERY | Facility: SURGERY CENTER | Age: 74
End: 2024-10-18
Payer: MEDICARE

## 2024-10-18 ENCOUNTER — OFFICE VISIT (OUTPATIENT)
Dept: PAIN MEDICINE | Facility: CLINIC | Age: 74
End: 2024-10-18
Payer: MEDICARE

## 2024-10-18 VITALS
OXYGEN SATURATION: 96 % | WEIGHT: 131.6 LBS | DIASTOLIC BLOOD PRESSURE: 74 MMHG | HEART RATE: 73 BPM | HEIGHT: 66 IN | BODY MASS INDEX: 21.15 KG/M2 | SYSTOLIC BLOOD PRESSURE: 109 MMHG | TEMPERATURE: 97.2 F

## 2024-10-18 DIAGNOSIS — M47.812 ARTHROPATHY OF CERVICAL FACET JOINT: Primary | ICD-10-CM

## 2024-10-18 DIAGNOSIS — M50.30 DEGENERATION OF CERVICAL INTERVERTEBRAL DISC: ICD-10-CM

## 2024-10-18 PROCEDURE — 1160F RVW MEDS BY RX/DR IN RCRD: CPT | Performed by: NURSE PRACTITIONER

## 2024-10-18 PROCEDURE — 1159F MED LIST DOCD IN RCRD: CPT | Performed by: NURSE PRACTITIONER

## 2024-10-18 PROCEDURE — 1126F AMNT PAIN NOTED NONE PRSNT: CPT | Performed by: NURSE PRACTITIONER

## 2024-10-18 PROCEDURE — 99212 OFFICE O/P EST SF 10 MIN: CPT | Performed by: NURSE PRACTITIONER

## 2024-10-18 RX ORDER — SODIUM CHLORIDE 0.9 % (FLUSH) 0.9 %
10 SYRINGE (ML) INJECTION EVERY 12 HOURS SCHEDULED
OUTPATIENT
Start: 2024-10-18

## 2024-10-18 RX ORDER — SODIUM CHLORIDE 0.9 % (FLUSH) 0.9 %
10 SYRINGE (ML) INJECTION AS NEEDED
OUTPATIENT
Start: 2024-10-18

## 2024-10-18 NOTE — PROGRESS NOTES
CHIEF COMPLAINT  F/U neck pain     Subjective   Chely Marie is a 74 y.o. female  who presents to the office for follow-up of procedure.  She completed a bilateral C4-C6 RFA   on  09/06/2024 performed by Dr. Wick for management of neck pain. Patient reports 95% relief from the procedure.     Procedures ---  Bilateral C4-C6 RFA on 3/24/2024 --- 100% relief x 4 months, 50% relief another 2 months   bilateral C4-C6 RFA  on 5/24/2023 --- 100% relief from the procedure x 7 months.      Pain averaging 6/10 VAS.   Utilizes Metaxalone 800 mg a few times a month, Hydrocodone 5/325 half tablet sparingly as well (once or twice a month). PCP has been managing medications. Tylenol is utilized first.      Neck Pain   This is a chronic problem. The current episode started more than 1 year ago. The problem occurs daily. The problem has been waxing and waning. The pain is present in the left side and right side. The quality of the pain is described as burning and aching. The pain is at a severity of 0/10. The pain is mild. The symptoms are aggravated by position. Pertinent negatives include no chest pain, fever, headaches, numbness or weakness. She has tried NSAIDs, oral narcotics, home exercises, muscle relaxants and chiropractic manipulation for the symptoms. The treatment provided significant (RFA) relief.        PEG Assessment   What number best describes your pain on average in the past week?3  What number best describes how, during the past week, pain has interfered with your enjoyment of life?0  What number best describes how, during the past week, pain has interfered with your general activity?  0    Review of Pertinent Medical Data ---    The following portions of the patient's history were reviewed and updated as appropriate: allergies, current medications, past family history, past medical history, past social history, past surgical history, and problem list.    Review of Systems   Constitutional:  Negative for  "activity change, chills, fatigue and fever.   HENT:  Negative for congestion.    Eyes:  Negative for visual disturbance.   Respiratory:  Negative for chest tightness and shortness of breath.    Cardiovascular:  Negative for chest pain.   Gastrointestinal:  Negative for abdominal pain, constipation and diarrhea.   Genitourinary:  Negative for difficulty urinating, dyspareunia and dysuria.   Musculoskeletal:  Positive for neck pain.   Neurological:  Negative for dizziness, weakness, light-headedness, numbness and headaches.   Psychiatric/Behavioral:  Negative for agitation and sleep disturbance. The patient is not nervous/anxious.        I have reviewed and confirmed the accuracy of the ROS as documented by the MA/LPN/RN SUSHMA Sellers     Vitals:    10/18/24 1127   BP: 109/74   Pulse: 73   Temp: 97.2 °F (36.2 °C)   SpO2: 96%   Weight: 59.7 kg (131 lb 9.6 oz)   Height: 167.6 cm (66\")   PainSc: 0-No pain         Objective   Physical Exam  Vitals and nursing note reviewed.   Constitutional:       General: She is not in acute distress.     Appearance: Normal appearance. She is not ill-appearing.   Pulmonary:      Effort: Pulmonary effort is normal. No respiratory distress.   Musculoskeletal:      Cervical back: No tenderness.   Neurological:      Mental Status: She is alert and oriented to person, place, and time.      Motor: Motor function is intact. No weakness.      Gait: Gait normal.   Psychiatric:         Mood and Affect: Mood normal.         Behavior: Behavior normal.           Assessment & Plan   Diagnoses and all orders for this visit:    1. Arthropathy of cervical facet joint (Primary)    2. Degeneration of cervical intervertebral disc      --- Repeat Cervical RFA PRN every 6 + months      Chely Marie reports a pain score of 0.  Given her pain assessment as noted, treatment options were discussed and the following options were decided upon as a follow-up plan to address the patient's pain: " continuation of current treatment plan for pain.      --- Follow-up PRN

## 2024-11-06 DIAGNOSIS — M80.00XD AGE-RELATED OSTEOPOROSIS WITH CURRENT PATHOLOGICAL FRACTURE WITH ROUTINE HEALING: Primary | ICD-10-CM

## 2024-11-08 ENCOUNTER — INFUSION (OUTPATIENT)
Dept: ONCOLOGY | Facility: HOSPITAL | Age: 74
End: 2024-11-08
Payer: MEDICARE

## 2024-11-08 ENCOUNTER — LAB (OUTPATIENT)
Dept: OTHER | Facility: HOSPITAL | Age: 74
End: 2024-11-08
Payer: MEDICARE

## 2024-11-08 DIAGNOSIS — M80.00XD AGE-RELATED OSTEOPOROSIS WITH CURRENT PATHOLOGICAL FRACTURE WITH ROUTINE HEALING: Primary | ICD-10-CM

## 2024-11-08 DIAGNOSIS — M80.00XD AGE-RELATED OSTEOPOROSIS WITH CURRENT PATHOLOGICAL FRACTURE WITH ROUTINE HEALING: ICD-10-CM

## 2024-11-08 LAB
ALBUMIN SERPL-MCNC: 4.3 G/DL (ref 3.5–5.2)
ALBUMIN/GLOB SERPL: 1.7 G/DL
ALP SERPL-CCNC: 51 U/L (ref 39–117)
ALT SERPL W P-5'-P-CCNC: 18 U/L (ref 1–33)
ANION GAP SERPL CALCULATED.3IONS-SCNC: 9.4 MMOL/L (ref 5–15)
AST SERPL-CCNC: 32 U/L (ref 1–32)
BILIRUB SERPL-MCNC: 0.2 MG/DL (ref 0–1.2)
BUN SERPL-MCNC: 21 MG/DL (ref 8–23)
BUN/CREAT SERPL: 20.8 (ref 7–25)
CALCIUM SPEC-SCNC: 10 MG/DL (ref 8.6–10.5)
CHLORIDE SERPL-SCNC: 104 MMOL/L (ref 98–107)
CO2 SERPL-SCNC: 30.6 MMOL/L (ref 22–29)
CREAT SERPL-MCNC: 1.01 MG/DL (ref 0.57–1)
EGFRCR SERPLBLD CKD-EPI 2021: 58.9 ML/MIN/1.73
GLOBULIN UR ELPH-MCNC: 2.6 GM/DL
GLUCOSE SERPL-MCNC: 90 MG/DL (ref 65–99)
MAGNESIUM SERPL-MCNC: 2.1 MG/DL (ref 1.6–2.4)
PHOSPHATE SERPL-MCNC: 3.3 MG/DL (ref 2.5–4.5)
POTASSIUM SERPL-SCNC: 4 MMOL/L (ref 3.5–5.2)
PROT SERPL-MCNC: 6.9 G/DL (ref 6–8.5)
SODIUM SERPL-SCNC: 144 MMOL/L (ref 136–145)

## 2024-11-08 PROCEDURE — 80053 COMPREHEN METABOLIC PANEL: CPT | Performed by: INTERNAL MEDICINE

## 2024-11-08 PROCEDURE — 96372 THER/PROPH/DIAG INJ SC/IM: CPT

## 2024-11-08 PROCEDURE — 83735 ASSAY OF MAGNESIUM: CPT | Performed by: INTERNAL MEDICINE

## 2024-11-08 PROCEDURE — 84100 ASSAY OF PHOSPHORUS: CPT | Performed by: INTERNAL MEDICINE

## 2024-11-08 PROCEDURE — 25010000002 DENOSUMAB 60 MG/ML SOLUTION PREFILLED SYRINGE: Performed by: INTERNAL MEDICINE

## 2024-11-08 RX ADMIN — DENOSUMAB 60 MG: 60 INJECTION SUBCUTANEOUS at 09:37

## 2024-11-08 NOTE — NURSING NOTE
Pt arrived for prolia injection. Indication and side effects reviewed. Denies recent dental work. Reviewed labs and verified medications. Pt tolerated injection well without complication. Encouraged pt to call ordering provider for any questions or concerns, and instructed on how to schedule future appts. Card given with phone # to call to schedule next appt for 6 months. Pt v/u and was discharged in stable condition.

## 2024-11-13 ENCOUNTER — TELEPHONE (OUTPATIENT)
Dept: PAIN MEDICINE | Facility: CLINIC | Age: 74
End: 2024-11-13
Payer: MEDICARE

## 2024-11-13 NOTE — TELEPHONE ENCOUNTER
Caller: Abhay Marie    Relationship: Emergency Contact    Best call back number: [phone]    What was the call regarding: PT'S  CALLING TO RELAY THAT PT MAY HAVE DONE SOMETHING TO BOTHER HER NECK (RIGHT SIDE IN PARTICULAR). PT HAS BEEN IN A LOT OF PAIN IN REGARDS TO THE R SIDE OF HER NECK. PT HAS BEEN RESTING IN THE BED ALL DAY BECAUSE OF THE PAIN. PT WOULD LIKE TO SPEAK WITH ANTHONY DIA IN REGARDS TO THIS PAIN. PLEASE CONTACT PT TO DISCUSS.

## 2024-11-14 RX ORDER — METHYLPREDNISOLONE 4 MG/1
TABLET ORAL
Qty: 21 TABLET | Refills: 0 | Status: SHIPPED | OUTPATIENT
Start: 2024-11-14

## 2024-11-14 NOTE — TELEPHONE ENCOUNTER
I spoke with Mrs. English's  Abhay and he states that his wife has had increased neck pain since last Sunday. He states that she had been out being more active with her family. She had been in the bed the past few days due to her neck pain. She has taken Tylenol, her muscle relaxer, used ice and she took a hydro/apap to help the pain. He states that her pain has improved today and he wanted to know if you could give his wife a prescription for a steroid pack in case her neck pain flares-up again? He states with the holidays coming up and them hosting Thanksgiving this year he doesn't want her to be stuck in bed again because of her neck pain.

## 2024-11-22 ENCOUNTER — APPOINTMENT (OUTPATIENT)
Dept: WOMENS IMAGING | Facility: HOSPITAL | Age: 74
End: 2024-11-22
Payer: MEDICARE

## 2024-11-22 PROCEDURE — 77067 SCR MAMMO BI INCL CAD: CPT | Performed by: RADIOLOGY

## 2024-11-22 PROCEDURE — 77063 BREAST TOMOSYNTHESIS BI: CPT | Performed by: RADIOLOGY

## 2024-11-25 NOTE — PROGRESS NOTES
Subjective   Patient ID: Chely Marie is a 74 y.o. female is here today for follow-up.    It has been about 18 years since she has been fused by another neurosurgeon at C5-C6.  She works with Dr. Wick.  She gets ablations every 6 months.  They seem to be helping although a little less each time.  She has not had any arm symptoms of pain or weakness.  She is not myelopathic.  She continues to work with the [facility] neurology team for her memory loss and dementia and is on both Aricept and Namenda.  Will continue to follow her and see her in a year with x-rays.  If she develops arm pain or weakness between now and then, she will let me know.  She is with her  today.    History of Present Illness    The following portions of the patient's history were reviewed and updated as appropriate: allergies, current medications, past family history, past medical history, past social history, past surgical history, and problem list.    Review of Systems   Constitutional:  Negative for fever.   Musculoskeletal:  Positive for neck pain. Negative for gait problem and neck stiffness.   Neurological:  Positive for dizziness (occationally). Negative for weakness, light-headedness, numbness and headaches.   All other systems reviewed and are negative.      Objective   Physical Exam  Constitutional:       General: She is awake.      Appearance: She is well-developed.   HENT:      Head: Normocephalic and atraumatic.   Eyes:      General: Lids are normal.      Extraocular Movements: Extraocular movements intact.      Conjunctiva/sclera: Conjunctivae normal.      Pupils: Pupils are equal, round, and reactive to light.   Neck:      Vascular: No carotid bruit.   Neurological:      Mental Status: She is alert.      Coordination: Coordination is intact.      Deep Tendon Reflexes:      Reflex Scores:       Tricep reflexes are 2+ on the right side and 2+ on the left side.       Bicep reflexes are 2+ on the right  side and 2+ on the left side.       Brachioradialis reflexes are 2+ on the right side and 2+ on the left side.       Patellar reflexes are 2+ on the right side and 2+ on the left side.       Achilles reflexes are 2+ on the right side and 2+ on the left side.  Psychiatric:         Speech: Speech normal.       Neurological Exam  Mental Status  Awake and alert. Oriented only to person, place, time and situation. Recent and remote memory are intact. Speech is normal. Language is fluent with no aphasia. Attention and concentration are normal. Fund of knowledge is appropriate for level of education.    Cranial Nerves  CN II: Visual acuity is normal. Visual fields full to confrontation.  CN III, IV, VI: Extraocular movements intact bilaterally. Normal lids and orbits bilaterally. Pupils equal round and reactive to light bilaterally.  CN V: Facial sensation is normal.  CN VII: Full and symmetric facial movement.  CN IX, X: Palate elevates symmetrically. Normal gag reflex.  CN XI: Shoulder shrug strength is normal.  CN XII: Tongue midline without atrophy or fasciculations.    Motor  Normal muscle bulk throughout. Normal muscle tone.                                               Right                     Left  Rhomboids                            5                          5  Infraspinatus                          5                          5  Supraspinatus                       5                          5  Deltoid                                   5                          5   Biceps                                   5                          5  Brachioradialis                      5                          5   Triceps                                  5                          5   Pronator                                5                          5   Supinator                              5                           5   Wrist flexor                            5                          5   Wrist extensor                        5                          5   Finger flexor                          5                          5   Finger extensor                     5                          5   Interossei                              5                          5   Abductor pollicis brevis         5                          5   Flexor pollicis brevis             5                          5   Opponens pollicis                 5                          5  Extensor digitorum               5                          5  Abductor digiti minimi           5                          5   Abdominal                            5                          5  Glutei                                    5                          5  Hip abductor                         5                          5  Hip adductor                         5                          5   Iliopsoas                               5                          5   Quadriceps                           5                          5   Hamstring                             5                          5   Gastrocnemius                     5                           5   Anterior tibialis                      5                          5   Posterior tibialis                    5                          5   Peroneal                               5                          5  Ankle dorsiflexor                   5                          5  Ankle plantar flexor              5                           5  Extensor hallucis longus      5                           5    Sensory  Light touch is normal in upper and lower extremities. Proprioception is normal in upper and lower extremities.     Reflexes                                            Right                      Left  Brachioradialis                    2+                         2+  Biceps                                 2+                         2+  Triceps                                2+                         2+  Finger flex                            2+                         2+  Hamstring                            2+                         2+  Patellar                                2+                         2+  Achilles                                2+                         2+    Coordination    Finger-to-nose, rapid alternating movements and heel-to-shin normal bilaterally without dysmetria.    Gait  Casual gait is normal including stance, stride, and arm swing.Normal toe walking. Normal heel walking. Normal tandem gait.       Assessment & Plan   Independent Review of Radiographic Studies:      The cervical MRI done on 1/7/2023 shows a fused segment at C5-C6 and some mild adjacent level disc degeneration at C4-C5 and C6-C7. There is no significant root or cord compression. There is some facet disease at multiple levels. Agree with the report.      Medical Decision Making:      Clinically stable.  Will see her in 1 year with x-rays.  She will continue working with Dr. Wick.  If she develops arm symptoms between now and then, she will let us know.      Diagnoses and all orders for this visit:    1. History of fusion of cervical spine (Primary)  -     XR spine cervical ap and lat w flex and ext; Future    2. Chronic neck pain    3. Degeneration of cervical intervertebral disc      Return in about 1 year (around 12/2/2025) for Face-to-face.

## 2024-12-02 ENCOUNTER — OFFICE VISIT (OUTPATIENT)
Dept: NEUROSURGERY | Facility: CLINIC | Age: 74
End: 2024-12-02
Payer: MEDICARE

## 2024-12-02 VITALS
RESPIRATION RATE: 20 BRPM | DIASTOLIC BLOOD PRESSURE: 72 MMHG | BODY MASS INDEX: 21.05 KG/M2 | SYSTOLIC BLOOD PRESSURE: 114 MMHG | HEIGHT: 66 IN | WEIGHT: 131 LBS

## 2024-12-02 DIAGNOSIS — Z98.1 HISTORY OF FUSION OF CERVICAL SPINE: Primary | ICD-10-CM

## 2024-12-02 DIAGNOSIS — G89.29 CHRONIC NECK PAIN: ICD-10-CM

## 2024-12-02 DIAGNOSIS — M54.2 CHRONIC NECK PAIN: ICD-10-CM

## 2024-12-02 DIAGNOSIS — M50.30 DEGENERATION OF CERVICAL INTERVERTEBRAL DISC: ICD-10-CM

## 2024-12-02 PROCEDURE — 1159F MED LIST DOCD IN RCRD: CPT | Performed by: NEUROLOGICAL SURGERY

## 2024-12-02 PROCEDURE — 1160F RVW MEDS BY RX/DR IN RCRD: CPT | Performed by: NEUROLOGICAL SURGERY

## 2024-12-02 PROCEDURE — 99213 OFFICE O/P EST LOW 20 MIN: CPT | Performed by: NEUROLOGICAL SURGERY

## 2024-12-10 ENCOUNTER — TRANSCRIBE ORDERS (OUTPATIENT)
Dept: SURGERY | Facility: SURGERY CENTER | Age: 74
End: 2024-12-10
Payer: MEDICARE

## 2024-12-10 DIAGNOSIS — Z41.9 SURGERY, ELECTIVE: Primary | ICD-10-CM

## 2024-12-26 ENCOUNTER — TELEPHONE (OUTPATIENT)
Dept: PAIN MEDICINE | Facility: CLINIC | Age: 74
End: 2024-12-26

## 2024-12-26 NOTE — TELEPHONE ENCOUNTER
Spoke with pt  ,he stated MDP helped a lot she got through [facility]. She was in bed all day yesterday/night.  She takes tylenol and muscle relaxant along with norco 5s ,she took one last night.  He is asking about medical marijuana if it can be an alternative for the steroid.  Right sided cervical pain.

## 2024-12-26 NOTE — TELEPHONE ENCOUNTER
Hub staff attempted to follow warm transfer process and was unsuccessful     Caller: Abhay Marie    Relationship to patient: Emergency Contact    Best call back number: [phone]    Patient is needing: PLEASE CALL BACK.. HE STATES HE WAS DISCONNECTED TRYING TO REACH MILI

## 2024-12-26 NOTE — TELEPHONE ENCOUNTER
Caller: VELASQUEZ   Relationship to Patient:    Phone Number: [phone] (home)     Reason for Call:  PATIENTS  STATES HE NEEDS A CALL BACK FROM CLINICAL, STATES IT A LONG STORY AND WOULD JUST LIKE A CALL BACK

## 2024-12-26 NOTE — TELEPHONE ENCOUNTER
Hub staff attempted to follow warm transfer process and was unsuccessful     Caller: Abhay Marie    Relationship to patient: Emergency Contact    Best call back number: [phone]    Patient is needing: RETURNING CALL

## 2024-12-26 NOTE — TELEPHONE ENCOUNTER
Please let her know that this is not something that our clinic is currently recommending.  There are clinics available that specialize in this if it is something she wishes to trial.

## 2024-12-26 NOTE — TELEPHONE ENCOUNTER
Hub staff attempted to follow warm transfer process and was unsuccessful     Caller: Abhay Marie    Relationship to patient: Emergency Contact    Best call back number: [phone]    Patient is needing: RETURNING CALL TO [facility]I

## 2025-01-07 ENCOUNTER — TELEPHONE (OUTPATIENT)
Dept: PAIN MEDICINE | Facility: CLINIC | Age: 75
End: 2025-01-07

## 2025-01-07 ENCOUNTER — TELEPHONE (OUTPATIENT)
Dept: PAIN MEDICINE | Facility: CLINIC | Age: 75
End: 2025-01-07
Payer: MEDICARE

## 2025-01-07 RX ORDER — HYDROCODONE BITARTRATE AND ACETAMINOPHEN 5; 325 MG/1; MG/1
1 TABLET ORAL EVERY 4 HOURS PRN
Qty: 18 TABLET | Refills: 0 | Status: SHIPPED | OUTPATIENT
Start: 2025-01-07

## 2025-01-07 RX ORDER — PROMETHAZINE HYDROCHLORIDE 25 MG/1
25 TABLET ORAL EVERY 8 HOURS PRN
Qty: 18 TABLET | Refills: 0 | Status: SHIPPED | OUTPATIENT
Start: 2025-01-07

## 2025-01-07 RX ORDER — METAXALONE 800 MG/1
800 TABLET ORAL EVERY 8 HOURS PRN
Qty: 45 TABLET | Refills: 1 | Status: SHIPPED | OUTPATIENT
Start: 2025-01-07

## 2025-01-07 NOTE — TELEPHONE ENCOUNTER
I spoke with Mr. Marie and he states that his wife only needs to take the Phenergan if she take the hydro/apap. He states that if their is another pain medication she can take without needing the Phenergan than he's ok with switching the medication otherwise he will contact her PCP for the refill on the Phenergan.

## 2025-01-07 NOTE — TELEPHONE ENCOUNTER
Provider: ANTHONY ORDAZ    Caller: VELASQUEZ MONTGOMERY    Relationship to Patient:  (ON VERBAL SARA AND PATIENT IN ROOM)    Pharmacy: [facility] [phone]    Phone Number: [phone]    Reason for Call:  PATIENT GETS SOME SCRIPTS FROM PCP AND SAYS ANTHONY DIA TOLD PATIENT TO CALL US FOR FUTURE REFILLS DUE TO ASSOCIATED WITH PAIN. THERE ARE 3 MEDS:    1) HYDROCODONE 5/325 MG., SI-2 TABS QID OR AS NEEDED (PATIENT HAS A FEW TABS LEFT)    2) METAXALONE 800 MG., SI TAB IN AM OR PRN (HAS #3 TABS LEFT)    3) PROMETHAZINE 25 MG., SI TAB QID PRN NAUSEA AND VOMITING    When was the patient last seen: 10/18/24

## 2025-01-07 NOTE — TELEPHONE ENCOUNTER
Hub staff attempted to follow warm transfer process and was unsuccessful     Caller: Abhay Marie    Relationship to patient: Emergency Contact    Best call back number: [phone]    Patient is needing: PATIENTS  RETURNED CALL TO [facility] - UNABLE TO WT - PATIENTS  WANTS A DIRECT LINE TO CALL BACK     THIS IS IN REGARDS TO PREVIOUS TE FROM TODAY

## 2025-01-07 NOTE — TELEPHONE ENCOUNTER
Sent in small quantity of Hydrocodone as well as Metaxalone.  Phenergan not for pain, I did not refill.

## 2025-01-08 NOTE — TELEPHONE ENCOUNTER
I spoke with Mrs. Marie's  and informed him that the Rx for phenergan was sent to his wife's pharmacy yesterday.

## 2025-01-21 ENCOUNTER — TRANSCRIBE ORDERS (OUTPATIENT)
Dept: ADMINISTRATIVE | Facility: HOSPITAL | Age: 75
End: 2025-01-21
Payer: MEDICARE

## 2025-01-21 DIAGNOSIS — R42 DIZZINESS AND GIDDINESS: Primary | ICD-10-CM

## 2025-02-04 ENCOUNTER — HOSPITAL ENCOUNTER (OUTPATIENT)
Dept: CARDIOLOGY | Facility: HOSPITAL | Age: 75
Discharge: HOME | End: 2025-02-04
Admitting: INTERNAL MEDICINE
Payer: MEDICARE

## 2025-02-04 DIAGNOSIS — R42 DIZZINESS AND GIDDINESS: ICD-10-CM

## 2025-02-04 PROCEDURE — 93225 XTRNL ECG REC<48 HRS REC: CPT

## 2025-02-04 PROCEDURE — 93226 XTRNL ECG REC<48 HR SCAN A/R: CPT

## 2025-02-06 LAB
CV ZIO BASELINE AVG BPM: 70
CV ZIO BASELINE BPM HIGH: 114
CV ZIO BASELINE BPM LOW: 56

## 2025-02-25 ENCOUNTER — OFFICE VISIT (OUTPATIENT)
Dept: PAIN MEDICINE | Facility: CLINIC | Age: 75
End: 2025-02-25
Payer: MEDICARE

## 2025-02-25 VITALS
TEMPERATURE: 96.6 F | DIASTOLIC BLOOD PRESSURE: 73 MMHG | SYSTOLIC BLOOD PRESSURE: 115 MMHG | HEIGHT: 66 IN | RESPIRATION RATE: 12 BRPM | BODY MASS INDEX: 21.16 KG/M2 | HEART RATE: 78 BPM | OXYGEN SATURATION: 97 %

## 2025-02-25 DIAGNOSIS — M50.30 DEGENERATION OF CERVICAL INTERVERTEBRAL DISC: ICD-10-CM

## 2025-02-25 DIAGNOSIS — M47.812 ARTHROPATHY OF CERVICAL FACET JOINT: Primary | ICD-10-CM

## 2025-02-25 PROCEDURE — 1160F RVW MEDS BY RX/DR IN RCRD: CPT | Performed by: NURSE PRACTITIONER

## 2025-02-25 PROCEDURE — 99213 OFFICE O/P EST LOW 20 MIN: CPT | Performed by: NURSE PRACTITIONER

## 2025-02-25 PROCEDURE — 1125F AMNT PAIN NOTED PAIN PRSNT: CPT | Performed by: NURSE PRACTITIONER

## 2025-02-25 PROCEDURE — 1159F MED LIST DOCD IN RCRD: CPT | Performed by: NURSE PRACTITIONER

## 2025-02-25 NOTE — PROGRESS NOTES
CHIEF COMPLAINT  Back pain  Pt reports improved pain.    Subjective   Chely Marie is a 74 y.o. female  who presents for follow-up.  She has a history of neck pain.    Pain averaging 3-4/10 VAS (stable).   She reports an occasional minor flare up, maybe once or twice a month.  In these episodes she will utilize Metaxalone 800 mg and/or Hydrocodone 5/325 half tablet. She also reports significant improvement with CBD balm which she will utilize before medication and in fact she has not needed oral medication at all in over 2 months since using the balm.  Otherwise she does quite remarkably after the cervical RFA.     Procedures ---  Bilateral C4-C6 on 09/06/2024 --- 95% relief (ongooing)  Bilateral C4-C6 RFA on 3/24/2024 --- 100% relief x 4 months, 50% relief another 2 months   bilateral C4-C6 RFA  on 5/24/2023 --- 100% relief from the procedure x 7 months.      Neck Pain   This is a chronic problem. The current episode started more than 1 year ago. The problem occurs daily. The problem has been waxing and waning. The pain is present in the left side and right side. The quality of the pain is described as burning and aching. The pain is at a severity of 3/10. The pain is mild. The symptoms are aggravated by position. Pertinent negatives include no chest pain, fever, headaches, numbness or weakness. She has tried NSAIDs, oral narcotics, home exercises, muscle relaxants and chiropractic manipulation for the symptoms. The treatment provided significant (RFA) relief.      PEG Assessment   What number best describes your pain on average in the past week?4  What number best describes how, during the past week, pain has interfered with your enjoyment of life?3  What number best describes how, during the past week, pain has interfered with your general activity?  3    Review of Pertinent Medical Data ---    The following portions of the patient's history were reviewed and updated as appropriate: allergies, current  "medications, past family history, past medical history, past social history, past surgical history, and problem list.    Review of Systems   Constitutional:  Negative for activity change, fatigue and fever.   Respiratory:  Negative for cough and chest tightness.    Cardiovascular:  Negative for chest pain.   Gastrointestinal:  Negative for abdominal pain, constipation and diarrhea.   Musculoskeletal:  Positive for neck pain.   Neurological:  Positive for dizziness. Negative for weakness, light-headedness, numbness and headaches.   Psychiatric/Behavioral:  Negative for agitation, sleep disturbance and suicidal ideas. The patient is not nervous/anxious and is not hyperactive.      I have reviewed and confirmed the accuracy of the ROS as documented by the MA/LPN/RN SUSHMA Sellers    Vitals:    02/25/25 0957   BP: 115/73   BP Location: Left arm   Patient Position: Sitting   Cuff Size: Adult   Pulse: 78   Resp: 12   Temp: 96.6 °F (35.9 °C)   TempSrc: Temporal   SpO2: 97%   Height: 167.6 cm (65.98\")   PainSc: 3          Objective   Physical Exam  Vitals and nursing note reviewed.   Constitutional:       General: She is not in acute distress.     Appearance: Normal appearance.   Musculoskeletal:      Cervical back: No tenderness.   Neurological:      Mental Status: She is alert.         Assessment & Plan   Diagnoses and all orders for this visit:    1. Arthropathy of cervical facet joint (Primary)    2. Degeneration of cervical intervertebral disc      --- Continue Metaxalone --- extremely sparing use, refills are not needed today   --- Acute supply of Hydrocodone sent in about 2 months ago, has not needed any, no refill needed at this time  --- Cervical RFA can be repeated every 6 + months as needed     Chely Marie reports a pain score of 3.  Given her pain assessment as noted, treatment options were discussed and the following options were decided upon as a follow-up plan to address the patient's pain: " continuation of current treatment plan for pain.      --- Follow-up 3 months/PRN                 CATHY REPORT    As the clinician, I personally reviewed the CATHY from 2/25/2025 while the patient was in the office today.    History and physical exam exhibit continued safe and appropriate use of controlled substances.    Dictated utilizing Dragon dictation.

## 2025-05-09 ENCOUNTER — INFUSION (OUTPATIENT)
Dept: ONCOLOGY | Facility: HOSPITAL | Age: 75
End: 2025-05-09
Payer: MEDICARE

## 2025-05-09 ENCOUNTER — LAB (OUTPATIENT)
Dept: OTHER | Facility: HOSPITAL | Age: 75
End: 2025-05-09
Payer: MEDICARE

## 2025-05-09 DIAGNOSIS — M80.00XD AGE-RELATED OSTEOPOROSIS WITH CURRENT PATHOLOGICAL FRACTURE WITH ROUTINE HEALING: Primary | ICD-10-CM

## 2025-05-09 DIAGNOSIS — M80.00XD AGE-RELATED OSTEOPOROSIS WITH CURRENT PATHOLOGICAL FRACTURE WITH ROUTINE HEALING: ICD-10-CM

## 2025-05-09 LAB
ALBUMIN SERPL-MCNC: 4.4 G/DL (ref 3.5–5.2)
ALBUMIN/GLOB SERPL: 1.7 G/DL
ALP SERPL-CCNC: 51 U/L (ref 39–117)
ALT SERPL W P-5'-P-CCNC: 17 U/L (ref 1–33)
ANION GAP SERPL CALCULATED.3IONS-SCNC: 9.1 MMOL/L (ref 5–15)
AST SERPL-CCNC: 29 U/L (ref 1–32)
BILIRUB SERPL-MCNC: 0.3 MG/DL (ref 0–1.2)
BUN SERPL-MCNC: 21 MG/DL (ref 8–23)
BUN/CREAT SERPL: 20.6 (ref 7–25)
CALCIUM SPEC-SCNC: 10.2 MG/DL (ref 8.6–10.5)
CHLORIDE SERPL-SCNC: 102 MMOL/L (ref 98–107)
CO2 SERPL-SCNC: 29.9 MMOL/L (ref 22–29)
CREAT SERPL-MCNC: 1.02 MG/DL (ref 0.57–1)
EGFRCR SERPLBLD CKD-EPI 2021: 58.2 ML/MIN/1.73
GLOBULIN UR ELPH-MCNC: 2.6 GM/DL
GLUCOSE SERPL-MCNC: 86 MG/DL (ref 65–99)
MAGNESIUM SERPL-MCNC: 2.1 MG/DL (ref 1.6–2.4)
PHOSPHATE SERPL-MCNC: 3.4 MG/DL (ref 2.5–4.5)
POTASSIUM SERPL-SCNC: 4.2 MMOL/L (ref 3.5–5.2)
PROT SERPL-MCNC: 7 G/DL (ref 6–8.5)
SODIUM SERPL-SCNC: 141 MMOL/L (ref 136–145)

## 2025-05-09 PROCEDURE — 80053 COMPREHEN METABOLIC PANEL: CPT | Performed by: INTERNAL MEDICINE

## 2025-05-09 PROCEDURE — 83735 ASSAY OF MAGNESIUM: CPT | Performed by: INTERNAL MEDICINE

## 2025-05-09 PROCEDURE — 84100 ASSAY OF PHOSPHORUS: CPT | Performed by: INTERNAL MEDICINE

## 2025-05-09 PROCEDURE — 25010000002 DENOSUMAB 60 MG/ML SOLUTION PREFILLED SYRINGE: Performed by: INTERNAL MEDICINE

## 2025-05-09 PROCEDURE — 96372 THER/PROPH/DIAG INJ SC/IM: CPT

## 2025-05-09 RX ADMIN — DENOSUMAB 60 MG: 60 INJECTION SUBCUTANEOUS at 09:54

## 2025-05-21 ENCOUNTER — TELEPHONE (OUTPATIENT)
Dept: PAIN MEDICINE | Facility: CLINIC | Age: 75
End: 2025-05-21

## 2025-05-21 NOTE — TELEPHONE ENCOUNTER
Caller: Abhay Marie    Relationship to patient: Emergency Contact    Best call back number:     Chief complaint: NECK PAIN    Type of visit: RADIOFREQUENCY ABLATION    Requested date: ASAP - WITHIN 2 WEEKS PREFERABLY

## 2025-05-22 ENCOUNTER — PREP FOR SURGERY (OUTPATIENT)
Dept: SURGERY | Facility: SURGERY CENTER | Age: 75
End: 2025-05-22
Payer: MEDICARE

## 2025-05-22 ENCOUNTER — TELEPHONE (OUTPATIENT)
Dept: PAIN MEDICINE | Facility: CLINIC | Age: 75
End: 2025-05-22
Payer: MEDICARE

## 2025-05-22 DIAGNOSIS — M47.812 ARTHROPATHY OF CERVICAL FACET JOINT: Primary | ICD-10-CM

## 2025-05-22 RX ORDER — SODIUM CHLORIDE 0.9 % (FLUSH) 0.9 %
10 SYRINGE (ML) INJECTION AS NEEDED
OUTPATIENT
Start: 2025-05-22

## 2025-05-22 RX ORDER — SODIUM CHLORIDE 0.9 % (FLUSH) 0.9 %
10 SYRINGE (ML) INJECTION EVERY 12 HOURS SCHEDULED
OUTPATIENT
Start: 2025-05-22

## 2025-05-22 NOTE — TELEPHONE ENCOUNTER
[facility] called and patient is wanting to schedule ablation. Patient has an appointment on 5/30/25 with you. Mr. Marie is asking if you could order the ablation so that she can go ahead and get on the schedule as they are leaving for vacation around 6/20/25. Please advise

## 2025-05-23 ENCOUNTER — TRANSCRIBE ORDERS (OUTPATIENT)
Dept: SURGERY | Facility: SURGERY CENTER | Age: 75
End: 2025-05-23
Payer: MEDICARE

## 2025-05-23 DIAGNOSIS — Z41.9 SURGERY, ELECTIVE: Primary | ICD-10-CM

## 2025-05-30 ENCOUNTER — OFFICE VISIT (OUTPATIENT)
Dept: PAIN MEDICINE | Facility: CLINIC | Age: 75
End: 2025-05-30
Payer: MEDICARE

## 2025-05-30 VITALS
HEIGHT: 66 IN | SYSTOLIC BLOOD PRESSURE: 127 MMHG | DIASTOLIC BLOOD PRESSURE: 83 MMHG | BODY MASS INDEX: 22.34 KG/M2 | HEART RATE: 71 BPM | WEIGHT: 139 LBS | TEMPERATURE: 96.8 F | OXYGEN SATURATION: 95 % | RESPIRATION RATE: 16 BRPM

## 2025-05-30 DIAGNOSIS — M50.30 DEGENERATION OF CERVICAL INTERVERTEBRAL DISC: ICD-10-CM

## 2025-05-30 DIAGNOSIS — M47.812 ARTHROPATHY OF CERVICAL FACET JOINT: Primary | ICD-10-CM

## 2025-05-30 PROCEDURE — 1126F AMNT PAIN NOTED NONE PRSNT: CPT | Performed by: NURSE PRACTITIONER

## 2025-05-30 PROCEDURE — 1159F MED LIST DOCD IN RCRD: CPT | Performed by: NURSE PRACTITIONER

## 2025-05-30 PROCEDURE — 1160F RVW MEDS BY RX/DR IN RCRD: CPT | Performed by: NURSE PRACTITIONER

## 2025-05-30 PROCEDURE — 99214 OFFICE O/P EST MOD 30 MIN: CPT | Performed by: NURSE PRACTITIONER

## 2025-05-30 RX ORDER — METHYLPREDNISOLONE 4 MG/1
TABLET ORAL
Qty: 21 TABLET | Refills: 0 | Status: SHIPPED | OUTPATIENT
Start: 2025-05-30

## 2025-05-30 NOTE — H&P (VIEW-ONLY)
CHIEF COMPLAINT  Neck pain  Patient here to discuss RFA.    Subjective   Chely Marie is a 74 y.o. female  who presents for follow-up.  She has a history of chronic neck pain.  Pain averaging 3-4/10 VAS.  Pain worsening over the past 1-2 weeks.  Results of cervical RFA seem to be waning.      Chronically she has an occasional minor flare up, maybe once or twice a month.  In these episodes she will utilize Metaxalone 800 mg and/or Hydrocodone 5/325 half tablet. She also reports significant improvement with CBD balm which she will utilize before medication and in typically she is able to avoid using the oral medication at all.  Otherwise she does quite remarkably after the cervical RFA.     Procedures ---  Bilateral C4-C6 on 09/06/2024 --- 95% relief x 8 months   Bilateral C4-C6 RFA on 3/24/2024 --- 100% relief x 4 months, 50% relief another 2 months   bilateral C4-C6 RFA  on 5/24/2023 --- 100% relief from the procedure x 7 months.     Neck Pain   This is a chronic problem. The current episode started more than 1 year ago. The problem occurs daily. The problem has been waxing and waning. The pain is present in the left side and right side. The quality of the pain is described as burning and aching. The pain is at a severity of 3/10. The pain is mild. The symptoms are aggravated by position. Pertinent negatives include no chest pain, fever, headaches, numbness or weakness. She has tried NSAIDs, oral narcotics, home exercises, muscle relaxants and chiropractic manipulation for the symptoms. The treatment provided significant (RFA) relief.      PEG Assessment   What number best describes your pain on average in the past week?3  What number best describes how, during the past week, pain has interfered with your enjoyment of life?3  What number best describes how, during the past week, pain has interfered with your general activity?  2    Review of Pertinent Medical Data ---    The following portions of the  "patient's history were reviewed and updated as appropriate: allergies, current medications, past family history, past medical history, past social history, past surgical history, and problem list.    Review of Systems   Constitutional:  Negative for activity change (less) and fever.   Cardiovascular:  Negative for chest pain.   Gastrointestinal:  Negative for constipation and diarrhea.   Musculoskeletal:  Positive for neck pain.   Neurological:  Negative for dizziness, weakness, light-headedness, numbness and headaches.   Psychiatric/Behavioral:  Negative for agitation, sleep disturbance and suicidal ideas. The patient is not nervous/anxious.      I have reviewed and confirmed the accuracy of the ROS as documented by the MA/LPN/RN SUSHMA Sellers    Vitals:    05/30/25 0912   BP: 127/83   BP Location: Left arm   Patient Position: Sitting   Cuff Size: Adult   Pulse: 71   Resp: 16   Temp: 96.8 °F (36 °C)   TempSrc: Temporal   SpO2: 95%   Weight: 63 kg (139 lb)   Height: 167.6 cm (65.98\")   PainSc: 0-No pain         Objective   Physical Exam  Vitals and nursing note reviewed.   Constitutional:       General: She is not in acute distress.     Appearance: Normal appearance. She is not ill-appearing.   Pulmonary:      Effort: Pulmonary effort is normal. No respiratory distress.   Musculoskeletal:      Cervical back: Tenderness, bony tenderness and crepitus present. Pain with movement present.      Comments: +cervical facet tenderness/loading    Neurological:      Mental Status: She is alert and oriented to person, place, and time.      Motor: Motor function is intact. No weakness.      Gait: Gait normal.   Psychiatric:         Mood and Affect: Mood normal.         Behavior: Behavior normal.     Assessment & Plan   Diagnoses and all orders for this visit:    1. Arthropathy of cervical facet joint (Primary)    2. Degeneration of cervical intervertebral disc      --- Bilateral C4-C6 RFA     Thermal Radiofrequency " Destruction    This repeat thermal radiofrequency destruction (RFA) of cervical, thoracic, or lumbar paravertebral facet joint (medial branch) nerves at the same anatomical site is considered medically reasonable and necessary as this patient has met the criteria of having a minimum of consistent 50% improvement in pain for at least six (6) months or at least 50% consistent improvement in the ability to perform previously painful movements and ADLs as compared to baseline measurement using the same scale.    Reviewed the procedure at length with the patient.  Included in the review was expectations, complications, risk and benefits.The procedure was described in detail and the risks, benefits and alternatives were discussed with the patient (including but not limited to: bleeding, infection, nerve damage, worsening of pain, inability to perform injection, paralysis, seizures, coma, no pain relief and death) who agreed to proceed.  Discussed the potential for sedation if warranted/wanted.  The procedure will plan to be performed at [facility] with fluoroscopic guidance(unless ultrasound is indicated) and could potentially have steroids and contrast dye used. Questions were answered and in a way the patient could understand.  Patient verbalized understanding and wishes to proceed.  This intervention will be ordered.  Discussed with patient that all procedures are part of a multimodal plan of care and include either formal PT or a home exercise program.  Patient has no evidence of coagulopathy or current infection.    Discussed with the patient that sedation is optional for this procedure.  The sedation offered is called conscious sedation which is different from general anesthesia that is utilized in surgical procedures. The dosing of the sedation is determined by the physician and they will be monitored throughout the procedure. With conscious sedation it is possible to remember parts or all of  the procedure, this is normal. They will need to have a  with them as driving is prohibited following conscious sedation.     NPO instructions for conscious sedation:  --- Do not eat 8 hours prior to the procedure.   --- Do not drink any dairy or citrus 4 hours prior to the procedure.   --- Do not drink anything, including clear liquids, 2 hours prior to procedure.     If the NPO instructions are not followed then the procedure may be performed without sedation or the procedure will need to be rescheduled.     --- She does not need any medication refills today   --- MDP sent to pharmacy. Traveling out of the country two weeks after RFA.  This is to have on hand in the event of a severe/acute painful exacerbation.      Chely Marie reports a pain score of 0.  Given her pain assessment as noted, treatment options were discussed and the following options were decided upon as a follow-up plan to address the patient's pain: see plan.    --- Follow-up for procedure

## 2025-05-30 NOTE — PROGRESS NOTES
CHIEF COMPLAINT  Neck pain  Patient here to discuss RFA.    Subjective   Chely Marie is a 74 y.o. female  who presents for follow-up.  She has a history of chronic neck pain.  Pain averaging 3-4/10 VAS.  Pain worsening over the past 1-2 weeks.  Results of cervical RFA seem to be waning.      Chronically she has an occasional minor flare up, maybe once or twice a month.  In these episodes she will utilize Metaxalone 800 mg and/or Hydrocodone 5/325 half tablet. She also reports significant improvement with CBD balm which she will utilize before medication and in typically she is able to avoid using the oral medication at all.  Otherwise she does quite remarkably after the cervical RFA.     Procedures ---  Bilateral C4-C6 on 09/06/2024 --- 95% relief x 8 months   Bilateral C4-C6 RFA on 3/24/2024 --- 100% relief x 4 months, 50% relief another 2 months   bilateral C4-C6 RFA  on 5/24/2023 --- 100% relief from the procedure x 7 months.     Neck Pain   This is a chronic problem. The current episode started more than 1 year ago. The problem occurs daily. The problem has been waxing and waning. The pain is present in the left side and right side. The quality of the pain is described as burning and aching. The pain is at a severity of 3/10. The pain is mild. The symptoms are aggravated by position. Pertinent negatives include no chest pain, fever, headaches, numbness or weakness. She has tried NSAIDs, oral narcotics, home exercises, muscle relaxants and chiropractic manipulation for the symptoms. The treatment provided significant (RFA) relief.      PEG Assessment   What number best describes your pain on average in the past week?3  What number best describes how, during the past week, pain has interfered with your enjoyment of life?3  What number best describes how, during the past week, pain has interfered with your general activity?  2    Review of Pertinent Medical Data ---    The following portions of the  "patient's history were reviewed and updated as appropriate: allergies, current medications, past family history, past medical history, past social history, past surgical history, and problem list.    Review of Systems   Constitutional:  Negative for activity change (less) and fever.   Cardiovascular:  Negative for chest pain.   Gastrointestinal:  Negative for constipation and diarrhea.   Musculoskeletal:  Positive for neck pain.   Neurological:  Negative for dizziness, weakness, light-headedness, numbness and headaches.   Psychiatric/Behavioral:  Negative for agitation, sleep disturbance and suicidal ideas. The patient is not nervous/anxious.      I have reviewed and confirmed the accuracy of the ROS as documented by the MA/LPN/RN SUSHMA Sellers    Vitals:    05/30/25 0912   BP: 127/83   BP Location: Left arm   Patient Position: Sitting   Cuff Size: Adult   Pulse: 71   Resp: 16   Temp: 96.8 °F (36 °C)   TempSrc: Temporal   SpO2: 95%   Weight: 63 kg (139 lb)   Height: 167.6 cm (65.98\")   PainSc: 0-No pain         Objective   Physical Exam  Vitals and nursing note reviewed.   Constitutional:       General: She is not in acute distress.     Appearance: Normal appearance. She is not ill-appearing.   Pulmonary:      Effort: Pulmonary effort is normal. No respiratory distress.   Musculoskeletal:      Cervical back: Tenderness, bony tenderness and crepitus present. Pain with movement present.      Comments: +cervical facet tenderness/loading    Neurological:      Mental Status: She is alert and oriented to person, place, and time.      Motor: Motor function is intact. No weakness.      Gait: Gait normal.   Psychiatric:         Mood and Affect: Mood normal.         Behavior: Behavior normal.     Assessment & Plan   Diagnoses and all orders for this visit:    1. Arthropathy of cervical facet joint (Primary)    2. Degeneration of cervical intervertebral disc      --- Bilateral C4-C6 RFA     Thermal Radiofrequency " Destruction    This repeat thermal radiofrequency destruction (RFA) of cervical, thoracic, or lumbar paravertebral facet joint (medial branch) nerves at the same anatomical site is considered medically reasonable and necessary as this patient has met the criteria of having a minimum of consistent 50% improvement in pain for at least six (6) months or at least 50% consistent improvement in the ability to perform previously painful movements and ADLs as compared to baseline measurement using the same scale.    Reviewed the procedure at length with the patient.  Included in the review was expectations, complications, risk and benefits.The procedure was described in detail and the risks, benefits and alternatives were discussed with the patient (including but not limited to: bleeding, infection, nerve damage, worsening of pain, inability to perform injection, paralysis, seizures, coma, no pain relief and death) who agreed to proceed.  Discussed the potential for sedation if warranted/wanted.  The procedure will plan to be performed at [facility] with fluoroscopic guidance(unless ultrasound is indicated) and could potentially have steroids and contrast dye used. Questions were answered and in a way the patient could understand.  Patient verbalized understanding and wishes to proceed.  This intervention will be ordered.  Discussed with patient that all procedures are part of a multimodal plan of care and include either formal PT or a home exercise program.  Patient has no evidence of coagulopathy or current infection.    Discussed with the patient that sedation is optional for this procedure.  The sedation offered is called conscious sedation which is different from general anesthesia that is utilized in surgical procedures. The dosing of the sedation is determined by the physician and they will be monitored throughout the procedure. With conscious sedation it is possible to remember parts or all of  the procedure, this is normal. They will need to have a  with them as driving is prohibited following conscious sedation.     NPO instructions for conscious sedation:  --- Do not eat 8 hours prior to the procedure.   --- Do not drink any dairy or citrus 4 hours prior to the procedure.   --- Do not drink anything, including clear liquids, 2 hours prior to procedure.     If the NPO instructions are not followed then the procedure may be performed without sedation or the procedure will need to be rescheduled.     --- She does not need any medication refills today   --- MDP sent to pharmacy. Traveling out of the country two weeks after RFA.  This is to have on hand in the event of a severe/acute painful exacerbation.      Chely Marie reports a pain score of 0.  Given her pain assessment as noted, treatment options were discussed and the following options were decided upon as a follow-up plan to address the patient's pain: see plan.    --- Follow-up for procedure

## 2025-06-06 RX ORDER — MIDAZOLAM HYDROCHLORIDE 1 MG/ML
1 INJECTION, SOLUTION INTRAMUSCULAR; INTRAVENOUS ONCE
Status: COMPLETED | OUTPATIENT
Start: 2025-06-06 | End: 2025-06-09

## 2025-06-06 RX ORDER — FENTANYL CITRATE 50 UG/ML
50 INJECTION, SOLUTION INTRAMUSCULAR; INTRAVENOUS ONCE
Refills: 0 | Status: COMPLETED | OUTPATIENT
Start: 2025-06-06 | End: 2025-06-09

## 2025-06-06 NOTE — DISCHARGE INSTRUCTIONS
[facility] - Post-procedure Instructions          --  While there are no absolute restrictions, it is recommended that you do not perform strenuous activity today. In the morning, you may resume your level of activity as before your block.    --  If you have a band-aid at your injection site, please remove it later today. Observe the area for any redness, swelling, pus-like drainage, or a temperature over 101°. If any of these symptoms occur, please call your doctor at [phone]. If after office hours, leave a message and the on-call provider will return your call.    --  Ice may be applied to your injection site. It is recommended you avoid direct heat (heating pad; hot tub) for 1-2 days.    --  Call [facility] at [phone] if you experience persistent headache, persistent bleeding from the injection site, or severe pain not relieved by heat or oral medication.    --  Do not make important decisions today.    --  Due to the effects of the block and/or the I.V. Sedation, DO NOT drive or operate hazardous machinery for 12 hours.  Local anesthetics may cause numbness after procedure and precautions must be taken with regards to operating equipment as well as with walking, even if ambulating with assistance of another person or with an assistive device.    --  Do not drink alcohol for 12 hours.    -- You may return to work tomorrow, or as directed by your referring doctor.    --  Occasionally you may notice a slight increase in your pain after the procedure. This should start to improve within the next 24-48 hours. Radiofrequency ablation procedure pain may last 3-4 weeks.    --  It may take as long as 3-4 days before you notice a gradual improvement in your pain and/or other symptoms.    -- You may continue to take your prescribed pain medication as needed.    --  Some normal possible side effects of steroid use could include fluid retention, increased blood sugar, dull headache,  "increased sweating, increased appetite, mood swings and flushing.    --  Diabetics are recommended to watch their blood glucose level closely for 24-48 hours after the injection.    --  Must stay in PACU for 20 min upon arrival and prove no leg weakness before being discharged.    --  IN THE EVENT OF A LIFE THREATENING EMERGENCY, (CHEST PAIN, BREATHING DIFFICULTIES, PARALYSIS…) YOU SHOULD GO TO YOUR NEAREST EMERGENCY ROOM.    --  You should be contacted by our office within 2-3 days to schedule follow up or next appointment date.  If not contacted within 7 days, please call the office at [phone]     Radiofrequency ablation (RFA):     - Radiofrequency ablation is a term used to describe cauterization or \"burning.\"   - In pain management, we can use this technique with a special needle to target and destroy areas that are causing your pain.   - In most cases, you must have TWO successful \"test injections\" before you are a candidate for RFA.    After your RFA:   - Because heat is used in this technique, it is common to have soreness after the procedure.  Sometimes \"neuritis\" occurs, which feels like tingling, prickly, or sunburn under the skin sensations.   - Ice packs are helpful in decreasing this soreness and preventing post-procedure \"neuritis\" pain.  Use an ice pack for 20 minutes at a time at least 3 times the day of and the day after your procedure.   - It is common to have arm/leg numbness or weakness the day of your procedure, but this should wear off by the following day.   - It may take up to 6 weeks to gain full benefit from this procedure.   "

## 2025-06-09 ENCOUNTER — HOSPITAL ENCOUNTER (OUTPATIENT)
Dept: GENERAL RADIOLOGY | Facility: SURGERY CENTER | Age: 75
End: 2025-06-09
Payer: MEDICARE

## 2025-06-09 ENCOUNTER — HOSPITAL ENCOUNTER (OUTPATIENT)
Facility: SURGERY CENTER | Age: 75
Setting detail: HOSPITAL OUTPATIENT SURGERY
Discharge: HOME | End: 2025-06-09
Attending: ANESTHESIOLOGY | Admitting: ANESTHESIOLOGY
Payer: MEDICARE

## 2025-06-09 VITALS
OXYGEN SATURATION: 95 % | WEIGHT: 133 LBS | SYSTOLIC BLOOD PRESSURE: 163 MMHG | HEART RATE: 60 BPM | TEMPERATURE: 97.8 F | DIASTOLIC BLOOD PRESSURE: 99 MMHG | HEIGHT: 67 IN | BODY MASS INDEX: 20.88 KG/M2 | RESPIRATION RATE: 17 BRPM

## 2025-06-09 DIAGNOSIS — Z41.9 SURGERY, ELECTIVE: ICD-10-CM

## 2025-06-09 DIAGNOSIS — M47.812 ARTHROPATHY OF CERVICAL FACET JOINT: ICD-10-CM

## 2025-06-09 PROCEDURE — 99152 MOD SED SAME PHYS/QHP 5/>YRS: CPT | Performed by: ANESTHESIOLOGY

## 2025-06-09 PROCEDURE — 25010000002 FENTANYL CITRATE (PF) 50 MCG/ML SOLUTION: Performed by: ANESTHESIOLOGY

## 2025-06-09 PROCEDURE — 64634 DESTROY C/TH FACET JNT ADDL: CPT | Performed by: ANESTHESIOLOGY

## 2025-06-09 PROCEDURE — 25010000002 LIDOCAINE PF 1% 1 % SOLUTION 5 ML VIAL: Performed by: ANESTHESIOLOGY

## 2025-06-09 PROCEDURE — 64633 DESTROY CERV/THOR FACET JNT: CPT | Performed by: ANESTHESIOLOGY

## 2025-06-09 PROCEDURE — 76000 FLUOROSCOPY <1 HR PHYS/QHP: CPT

## 2025-06-09 PROCEDURE — 25010000002 BUPIVACAINE (PF) 0.25 % SOLUTION 10 ML VIAL: Performed by: ANESTHESIOLOGY

## 2025-06-09 PROCEDURE — 25010000002 LIDOCAINE PF 2% 2 % SOLUTION 5 ML VIAL: Performed by: ANESTHESIOLOGY

## 2025-06-09 PROCEDURE — 25010000002 MIDAZOLAM PER 1 MG: Performed by: ANESTHESIOLOGY

## 2025-06-09 RX ORDER — SODIUM CHLORIDE 0.9 % (FLUSH) 0.9 %
10 SYRINGE (ML) INJECTION AS NEEDED
Status: DISCONTINUED | OUTPATIENT
Start: 2025-06-09 | End: 2025-06-09 | Stop reason: HOSPADM

## 2025-06-09 RX ORDER — SODIUM CHLORIDE 0.9 % (FLUSH) 0.9 %
10 SYRINGE (ML) INJECTION EVERY 12 HOURS SCHEDULED
Status: DISCONTINUED | OUTPATIENT
Start: 2025-06-09 | End: 2025-06-09 | Stop reason: HOSPADM

## 2025-06-09 RX ADMIN — FENTANYL CITRATE 50 MCG: 50 INJECTION INTRAMUSCULAR; INTRAVENOUS at 08:45

## 2025-06-09 RX ADMIN — MIDAZOLAM 1 MG: 1 INJECTION INTRAMUSCULAR; INTRAVENOUS at 08:45

## 2025-06-09 NOTE — OP NOTE
BILATERAL Cervical Medial Branch Radiofrequency Ablation  [facility]      PREOPERATIVE DIAGNOSIS:  Cervical spondylosis without myelopathy   POSTOPERATIVE DIAGNOSIS:  Same as preoperative diagnosis    PROCEDURE:    Cervical Facet Nerve (medial branch) Radiofrequency Ablation, with Fluoroscopy:  LEVELS C4, C5, and C6  98965 - 50 Cervical Medial Branch Ablation, 1st level  [id number] - 50 Cervical Medial Branch Ablation, 2nd level    PRE-PROCEDURE DISCUSSION WITH PATIENT:    Risks and complications were discussed with the patient prior to starting the procedure and informed consent was obtained.    SURGEON:  Aniyah Wick MD    REASON FOR PROCEDURE:    The patient complains of pain that seems to have a significant axial component Previous clinically significant therapeutic effect after prior Radiofrequency procedure    Time of the procedure with sedation: 2714-7966 minutes    SEDATION:  Anxiolysis was used for this procedure which included Versed 1mg & Fentanyl 50mcg  ANESTHETIC:   2% Lidocaine and 0.25% Bupivacaine  STEROID:  None  TOTAL VOLUME OF SOLUTION:  6mL    DESCRIPTON OF PROCEDURE:  After obtaining informed consent, the patient was placed in the prone position. IV access was obtained.  Heart rate, blood pressure, and pulse oximeter were monitored and all sedation was administered by an RN under my direct guidance.  The cervical area was prepped with Chloraprep and draped with sterile barrier.     AP fluoroscopic image was used to visualize the waists of the articular pillars on the BILATERAL  side at C4, C5, and C6. The skin and subcutaneous tissue over the lateral edge of each pillar was anesthetized with 1% lidocaine. A 20-gauge 100mm RF Stanley needle was then advanced percutaneously through the anesthetized skin tracts under fluoroscopic guidance in a coaxial view to contact periosteum at each of these target sites.  Lateral fluoroscopy was then used to advance the needle tips to lie at  the midpoints of the articular pillars at each level.  All needle tips were radiographically confirmed to be posterior to the neural foramen.  Sensory stimulation was then performed with good stimulation of the neck at 0.5V or less at each level. Motor stimulation was performed up to 1.5V at each level producing stimulation of the multifidus muscles of the neck and no stimulation of the upper extremity at any level. There was also no change phonation during motor stimulation.  Each level was then anesthetized with 2% lidocaine prior to treatment with pulsed radiofrequency thermocoagulation at 42 degrees Celsius. Each level was then treated with thermal radiofrequency thermocoagulation at 80 degrees Celsius for 60 seconds in two separate cycles.  Prior to the removal of each needle, a volume of 1 mL of 0.25% bupivacaine was administered at each site.      Needles were removed intact from all levels.  Vitals were stable throughout.       ESTIMATED BLOOD LOSS:  minimal  SPECIMENS:  None    COMPLICATIONS:    No complications were noted.    TOLERANCE & DISCHARGE CONDITION:    The patient tolerated the procedure well.  The patient was transported to the recovery area without difficulties.  The patient was discharged to home under the care of family in stable and satisfactory condition.     PLAN OF CARE:  The patient was given our standard instruction sheet.  The patient will Return to clinic in 8 wks.  The patient will resume all medications as per the medication reconciliation sheet.

## 2025-06-13 ENCOUNTER — TELEPHONE (OUTPATIENT)
Dept: PAIN MEDICINE | Facility: CLINIC | Age: 75
End: 2025-06-13
Payer: MEDICARE

## 2025-06-13 NOTE — TELEPHONE ENCOUNTER
TALKED TO PATIENT , PER DR. BAIRD WANTED PATIENT TO F/UP IN 8 WK FROM PROCEDURE ON 6/8/25, PER  WOULD CALL BACK AND SCHEDULE ONCE THEY GET BACK FROM VACATION DEPNDING ON THE WAY SHE IS FEELING.   Vaccine Information Statement(s) or the Emergency Use Authorization was given today. This has been reviewed, questions answered, and verbal consent given by Parent for injection(s) and administration of Hepatitis A.        Patient tolerated without incident. See immunization grid for documentation.

## (undated) DEVICE — GLV SURG TRIUMPH PF LTX 7.5 STRL

## (undated) DEVICE — TOWEL,OR,DSP,ST,BLUE,STD,4/PK,20PK/CS: Brand: MEDLINE

## (undated) DEVICE — EPIDURAL TRAY: Brand: MEDLINE INDUSTRIES, INC.

## (undated) DEVICE — DISPOSABLE TOURNIQUET CUFF SINGLE BLADDER, SINGLE PORT AND QUICK CONNECT CONNECTOR: Brand: COLOR CUFF

## (undated) DEVICE — UNDERCAST PADDING: Brand: DEROYAL

## (undated) DEVICE — CANN O2 ETCO2 FITS ALL CONN CO2 SMPL A/ 7IN DISP LF

## (undated) DEVICE — PRECISION THIN (9.0 X 0.38 X 25.0MM)

## (undated) DEVICE — SUT PROLN 3/0 FS2 18IN 8665G

## (undated) DEVICE — KT ORCA ORCAPOD DISP STRL

## (undated) DEVICE — DRSNG GZ CURAD XEROFORM NONADHS 5X9IN STRL

## (undated) DEVICE — GLV SURG SENSICARE PI PF LF 8.5 GRN STRL

## (undated) DEVICE — SUT VIC 2/0 X1 27IN J459H

## (undated) DEVICE — PETROLATUM DRESSING. FINE MESH GAUZE IMPREGNATED WITH 3% BISMUTH TRIBROMOPHENATE  IN A PETROLATUM BLEND.: Brand: XEROFORM PETROLATUM

## (undated) DEVICE — Device

## (undated) DEVICE — PROXIMATE RH ROTATING HEAD SKIN STAPLERS (35 WIDE) CONTAINS 35 STAINLESS STEEL STAPLES: Brand: PROXIMATE

## (undated) DEVICE — PREMIUM WET SKIN PREP TRAY: Brand: MEDLINE INDUSTRIES, INC.

## (undated) DEVICE — DRAPE,REIN 53X77,STERILE: Brand: MEDLINE

## (undated) DEVICE — DRP C/ARM 41X74IN

## (undated) DEVICE — THE TORRENT IRRIGATION SCOPE CONNECTOR IS USED WITH THE TORRENT IRRIGATION TUBING TO PROVIDE IRRIGATION FLUIDS SUCH AS STERILE WATER DURING GASTROINTESTINAL ENDOSCOPIC PROCEDURES WHEN USED IN CONJUNCTION WITH AN IRRIGATION PUMP (OR ELECTROSURGICAL UNIT).: Brand: TORRENT

## (undated) DEVICE — GLV SURG TRIUMPH PF LTX 7 STRL

## (undated) DEVICE — SENSR O2 OXIMAX FNGR A/ 18IN NONSTR

## (undated) DEVICE — BNDG ELAS ELITE V/CLOSE 6IN 5YD LF STRL

## (undated) DEVICE — BIT DRL MAXTORQUE STD 2.4MM NS

## (undated) DEVICE — PAD GRND CATHAY W/CABL DISP

## (undated) DEVICE — ADAPT CLN BIOGUARD AIR/H2O DISP

## (undated) DEVICE — SPNG GZ WOVN 4X4IN 12PLY 10/BX STRL

## (undated) DEVICE — STCKNT IMPERV 12IN STRL

## (undated) DEVICE — BIT DRL MAXLOCK EXTRM STD 2.7MM

## (undated) DEVICE — NDL SPINE 22G 31/2IN BLK

## (undated) DEVICE — TUBING, SUCTION, 1/4" X 10', STRAIGHT: Brand: MEDLINE

## (undated) DEVICE — ENCORE® LATEX ORTHO SIZE 8, STERILE LATEX POWDER-FREE SURGICAL GLOVE: Brand: ENCORE

## (undated) DEVICE — PAD,ABDOMINAL,8"X10",ST,LF: Brand: MEDLINE

## (undated) DEVICE — PK ORTHO MINOR TOWER 40

## (undated) DEVICE — SUT VIC 0 CT1 36IN J946H

## (undated) DEVICE — DRSNG PAD ABD 8X10IN STRL

## (undated) DEVICE — SUT VIC 0 CT1 CR8 27IN JJ41G

## (undated) DEVICE — BANDAGE,GAUZE,BULKEE II,4.5"X4.1YD,STRL: Brand: MEDLINE

## (undated) DEVICE — CANN NASL O2 INF

## (undated) DEVICE — SINGLE-USE BIOPSY FORCEPS: Brand: RADIAL JAW 4

## (undated) DEVICE — PAD GRND E/S NT200IX RF/GEN W/CABL DISP

## (undated) DEVICE — SPLNT PLSTR ORTHO 5IN

## (undated) DEVICE — LN SMPL CO2 SHTRM SD STREAM W/M LUER